# Patient Record
Sex: MALE | Race: WHITE | Employment: FULL TIME | URBAN - METROPOLITAN AREA
[De-identification: names, ages, dates, MRNs, and addresses within clinical notes are randomized per-mention and may not be internally consistent; named-entity substitution may affect disease eponyms.]

---

## 2019-10-23 ENCOUNTER — HOSPITAL ENCOUNTER (OUTPATIENT)
Dept: CT IMAGING | Age: 59
Discharge: HOME | End: 2019-10-23
Attending: NURSE PRACTITIONER
Payer: COMMERCIAL

## 2019-10-23 DIAGNOSIS — I73.9 PAD (PERIPHERAL ARTERY DISEASE) (HCC): ICD-10-CM

## 2019-10-23 LAB — CREAT BLD-MCNC: 0.8 MG/DL (ref 0.8–1.5)

## 2019-10-23 PROCEDURE — 82565 ASSAY OF CREATININE: CPT

## 2019-10-23 PROCEDURE — 74011000258 HC RX REV CODE- 258: Performed by: NURSE PRACTITIONER

## 2019-10-23 PROCEDURE — 74011636320 HC RX REV CODE- 636/320: Performed by: NURSE PRACTITIONER

## 2019-10-23 PROCEDURE — 75635 CT ANGIO ABDOMINAL ARTERIES: CPT

## 2019-10-23 RX ORDER — SODIUM CHLORIDE 0.9 % (FLUSH) 0.9 %
10 SYRINGE (ML) INJECTION
Status: COMPLETED | OUTPATIENT
Start: 2019-10-23 | End: 2019-10-23

## 2019-10-23 RX ADMIN — SODIUM CHLORIDE 100 ML: 900 INJECTION, SOLUTION INTRAVENOUS at 08:41

## 2019-10-23 RX ADMIN — IOPAMIDOL 100 ML: 755 INJECTION, SOLUTION INTRAVENOUS at 08:41

## 2019-10-23 RX ADMIN — Medication 10 ML: at 08:41

## 2021-12-09 PROBLEM — K40.20 NON-RECURRENT BILATERAL INGUINAL HERNIA WITHOUT OBSTRUCTION OR GANGRENE: Status: ACTIVE | Noted: 2021-12-09

## 2022-03-20 PROBLEM — K40.20 NON-RECURRENT BILATERAL INGUINAL HERNIA WITHOUT OBSTRUCTION OR GANGRENE: Status: ACTIVE | Noted: 2021-12-09

## 2022-07-15 ENCOUNTER — OFFICE VISIT (OUTPATIENT)
Dept: VASCULAR SURGERY | Age: 62
End: 2022-07-15
Payer: COMMERCIAL

## 2022-07-15 VITALS
OXYGEN SATURATION: 97 % | HEIGHT: 70 IN | HEART RATE: 72 BPM | SYSTOLIC BLOOD PRESSURE: 156 MMHG | TEMPERATURE: 97.3 F | DIASTOLIC BLOOD PRESSURE: 85 MMHG | WEIGHT: 194.2 LBS | BODY MASS INDEX: 27.8 KG/M2

## 2022-07-15 DIAGNOSIS — I73.9 PVD (PERIPHERAL VASCULAR DISEASE) WITH CLAUDICATION (HCC): Primary | ICD-10-CM

## 2022-07-15 PROCEDURE — 99213 OFFICE O/P EST LOW 20 MIN: CPT | Performed by: SURGERY

## 2022-07-15 ASSESSMENT — PATIENT HEALTH QUESTIONNAIRE - PHQ9
1. LITTLE INTEREST OR PLEASURE IN DOING THINGS: 0
SUM OF ALL RESPONSES TO PHQ QUESTIONS 1-9: 0
SUM OF ALL RESPONSES TO PHQ QUESTIONS 1-9: 0
SUM OF ALL RESPONSES TO PHQ9 QUESTIONS 1 & 2: 0
2. FEELING DOWN, DEPRESSED OR HOPELESS: 0
SUM OF ALL RESPONSES TO PHQ QUESTIONS 1-9: 0
SUM OF ALL RESPONSES TO PHQ QUESTIONS 1-9: 0

## 2022-07-15 NOTE — PROGRESS NOTES
[address]   [address] FAX: [phone] [facility]. : 1960    Chief Complaint:    History of Present Illness: Follow-up for duplex study. History of aneurysm disease. Patient denies any claudication or rest pain symptoms. CURRENT MEDICATIONS:  Current Outpatient Medications   Medication Sig Dispense Refill    aspirin 81 MG EC tablet Take 81 mg by mouth daily       No current facility-administered medications for this visit. Past Medical History:   Diagnosis Date    Diverticulosis of colon (without mention of hemorrhage)     no symptoms    Dysrhythmia, cardiac     heart palpatations on occasion    Hypercholesterolemia     managed with meds    GABE on CPAP     central and obstructive, CPAP 9 cm H2O does not wear cpap    Personal history of colonic polyps 2015    adenoma, TVA    PVD (peripheral vascular disease) with claudication (HCC)     Snoring     Tobacco abuse        Physical Examination:   Height: 5' 10\" (1.778 m), Weight: 194 lb 3.2 oz (88.1 kg), BP: (!) 156/85    Constitutional: he appears well-developed. No distress. HENT:   Head: Atraumatic. Eyes: Pupils are equal, round, and reactive to light. Neck: Normal range of motion. Cardiovascular: Regular rhythm. Pulmonary/Chest: Effort normal and breath sounds normal. No respiratory distress. Abdominal: Soft. Bowel sounds are normal. he exhibits no distension. There is no tenderness. There is no guarding. No hernia. Musculoskeletal: Normal range of motion. Neurological: He is alert. 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 Yeison Tamayo. is a 63y.o. 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   Will order CTA runoff in preparation to endovascular repair. Nirmal Chappell MD    Elements of this note have been dictated using speech recognition software. As a result, errors of speech recognition may have occurred.     30 minutes of time was spent on this encounter including chart review, assessment, evaluation and coordination of patient care

## 2022-07-15 NOTE — PATIENT INSTRUCTIONS
Patient Education        Carotid Stenosis: Care Instructions  Overview     Carotid stenosis is narrowing of one or both of the carotid arteries. These arteries take blood from the heart to the brain. There is one on each side ofthe neck. Carotid artery stenosis is caused by a process called hardening of the arteries, or atherosclerosis. A substance called plaque builds up inside the carotid arteries. Things that can lead to plaque include diabetes, high blood pressure, high cholesterol, and smoking. This plaque may limit blood flow to your brain. If plaque breaks open, it may form a blood clot. Or pieces of the plaque may break off. A piece of plaque or a blood clot could move to thebrain, causing a stroke or transient ischemic attack (TIA). The goal of treatment is to lower your risk of having a stroke or TIA. You can lower your risk by having a heart-healthy lifestyle and taking medicine. Sometimes a surgery or procedure is also done. Follow-up care is a key part of your treatment and safety. Be sure to make and go to all appointments, and call your doctor if you are having problems. It's also a good idea to know your test results and keep alist of the medicines you take. How can you care for yourself at home? Take your medicines exactly as prescribed. Call your doctor if you think you are having a problem with your medicine. You may take medicine to lower your blood pressure, to lower your cholesterol, or to prevent blood clots. If you take a blood thinner, such as aspirin, be sure to get instructions about how to take your medicine safely. Blood thinners can cause serious bleeding problems. Do not smoke. People who smoke have a higher chance of stroke than those who quit. If you need help quitting, talk to your doctor about stop-smoking programs and medicines. These can increase your chances of quitting for good. Eat heart-healthy foods.  These foods include vegetables, fruits, nuts, beans, lean meat, Incorporated disclaims any warranty or liability for your use of this information. Patient Education        Peripheral Arterial Disease (PAD): Care Instructions  Overview  Peripheral arterial disease (PAD) occurs when the blood vessels (arteries) that supply blood to the legs, belly, pelvis, arms, or neck get narrow or blocked. This reduces blood flow to that area. The legs are affected most often. PAD is often caused by fatty buildup (plaque) in the arteries. This buildup is also called \"hardening\" of the arteries. Your risk of PAD increases if you smoke or have high cholesterol, high blood pressure, diabetes, or a familyhistory of PAD. Many people don't have symptoms. If you do have symptoms, you may have weak or tired legs, difficulty walking or balancing, or pain. If you have pain, you might feel a tight, aching, or squeezing pain in the calf, foot, thigh, or buttock that occurs during exercise. The pain usually gets worse during exercise and goes away when you rest. If PAD gets worse, you may have symptomsof poor blood flow, such as leg pain when you rest.  Medicines and lifestyle changes may help your symptoms and lower your risk of heart attack and stroke. In some cases, surgery or other treatment is needed. It is important that you follow up with your doctor. Follow-up care is a key part of your treatment and safety. Be sure to make and go to all appointments, and call your doctor if you are having problems. It's also a good idea to know your test results and keep alist of the medicines you take. How can you care for yourself at home? Do not smoke. Smoking can make PAD worse. If you need help quitting, talk to your doctor about stop-smoking programs and medicines. These can increase your chances of quitting for good. Take your medicines exactly as prescribed. Call your doctor if you think you are having a problem with your medicine.   If you take a blood thinner, such as aspirin, be sure to get both shoulders or arms. Lightheadedness or sudden weakness. A fast or irregular heartbeat. After you call 911, the  may tell you to chew 1 adult-strength or 2 to 4 low-doseaspirin. Wait for an ambulance. Do not try to drive yourself. You have sudden, severe leg pain, and your leg is cool and pale. You have symptoms of a stroke. These may include:  Sudden numbness, tingling, weakness, or loss of movement in your face, arm, or leg, especially on only one side of your body. Sudden vision changes. Sudden trouble speaking. Sudden confusion or trouble understanding simple statements. Sudden problems with walking or balance. A sudden, severe headache that is different from past headaches. Call your doctor now or seek immediate medical care if:    You have leg pain that does not go away even if you rest.     Your leg pain changes or gets worse. For example, if you have more pain with normal activity or the same pain with decreased activity, you should call. You have cold or numb feet or toes. You have leg or foot sores that are slow to heal.     The skin on your legs or feet changes color. You have an open sore on your leg or foot that is infected. Signs of infection include: Increased pain, swelling, warmth, or redness. Red streaks leading from the sore. Pus draining from the sore. A fever. Watch closely for changes in your health, and be sure to contact your doctor ifyou have any problems. Where can you learn more? Go to [email or web address].nr. org and sign in to your [employer] account. Enter [phone] in the [facility] box to learn more about \"Peripheral Arterial Disease (PAD): Care Instructions. \"     If you do not have an account, please click on the \"Sign Up Now\" link. Current as of: January 10, 2022               Content Version: 13.3  © 5449-0415 Healthwise, Incorporated. Care instructions adapted under license by [facility] ([facility]).  If you have questions

## 2022-07-28 ENCOUNTER — HOSPITAL ENCOUNTER (OUTPATIENT)
Dept: CT IMAGING | Age: 62
Discharge: HOME | End: 2022-07-31
Payer: COMMERCIAL

## 2022-07-28 DIAGNOSIS — I73.9 PVD (PERIPHERAL VASCULAR DISEASE) WITH CLAUDICATION (HCC): ICD-10-CM

## 2022-07-28 LAB — CREAT BLD-MCNC: 0.73 MG/DL (ref 0.8–1.5)

## 2022-07-28 PROCEDURE — 82565 ASSAY OF CREATININE: CPT

## 2022-07-28 PROCEDURE — 6360000004 HC RX CONTRAST MEDICATION: Performed by: SURGERY

## 2022-07-28 PROCEDURE — 75635 CT ANGIO ABDOMINAL ARTERIES: CPT

## 2022-07-28 PROCEDURE — 2580000003 HC RX 258: Performed by: SURGERY

## 2022-07-28 RX ORDER — 0.9 % SODIUM CHLORIDE 0.9 %
100 INTRAVENOUS SOLUTION INTRAVENOUS
Status: COMPLETED | OUTPATIENT
Start: 2022-07-28 | End: 2022-07-28

## 2022-07-28 RX ORDER — SODIUM CHLORIDE 0.9 % (FLUSH) 0.9 %
10 SYRINGE (ML) INJECTION
Status: COMPLETED | OUTPATIENT
Start: 2022-07-28 | End: 2022-07-28

## 2022-07-28 RX ADMIN — IOPAMIDOL 100 ML: 755 INJECTION, SOLUTION INTRAVENOUS at 11:39

## 2022-07-28 RX ADMIN — SODIUM CHLORIDE 100 ML: 900 INJECTION, SOLUTION INTRAVENOUS at 11:39

## 2022-07-28 RX ADMIN — SODIUM CHLORIDE, PRESERVATIVE FREE 10 ML: 5 INJECTION INTRAVENOUS at 11:39

## 2022-08-09 ENCOUNTER — OFFICE VISIT (OUTPATIENT)
Dept: VASCULAR SURGERY | Age: 62
End: 2022-08-09
Payer: COMMERCIAL

## 2022-08-09 VITALS
DIASTOLIC BLOOD PRESSURE: 96 MMHG | RESPIRATION RATE: 98 BRPM | WEIGHT: 187 LBS | BODY MASS INDEX: 26.77 KG/M2 | TEMPERATURE: 98.1 F | HEIGHT: 70 IN | SYSTOLIC BLOOD PRESSURE: 155 MMHG | HEART RATE: 58 BPM

## 2022-08-09 DIAGNOSIS — I73.9 PVD (PERIPHERAL VASCULAR DISEASE) WITH CLAUDICATION (HCC): Primary | ICD-10-CM

## 2022-08-09 PROCEDURE — 99213 OFFICE O/P EST LOW 20 MIN: CPT | Performed by: SURGERY

## 2022-08-09 NOTE — PROGRESS NOTES
[address]   830 [facility]. [address] FAX: [phone] [facility]. : 1960    Chief Complaint:     History of Present Illness:   Patient follows up today for presents with CTA with known popliteal artery aneurysm. He does have some pain in his right leg. CURRENT MEDICATIONS:  Current Outpatient Medications   Medication Sig Dispense Refill    aspirin 81 MG EC tablet Take 81 mg by mouth daily       No current facility-administered medications for this visit. Past Medical History:   Diagnosis Date    Diverticulosis of colon (without mention of hemorrhage)     no symptoms    Dysrhythmia, cardiac     heart palpatations on occasion    Hypercholesterolemia     managed with meds    GABE on CPAP     central and obstructive, CPAP 9 cm H2O does not wear cpap    Personal history of colonic polyps 2015    adenoma, TVA    PVD (peripheral vascular disease) with claudication (HCC)     Snoring     Tobacco abuse        Physical Examination:   Height: 5' 10\" (1.778 m), Weight: 187 lb (84.8 kg), BP: (!) 155/96    Constitutional: he appears well-developed. No distress. HENT:   Head: Atraumatic. Eyes: Pupils are equal, round, and reactive to light. Neck: Normal range of motion. Cardiovascular: Regular rhythm. Pulmonary/Chest: Effort normal and breath sounds normal. No respiratory distress. Abdominal: Soft. Bowel sounds are normal. he exhibits no distension. There is no tenderness. There is no guarding. No hernia. Musculoskeletal: Normal range of motion. Neurological: He is alert. CN II- XII grossly intact   Vascular: Pulses have    Imaging:  CTA showed a 2.6 cm right popliteal artery aneurysm        Recommendations/Plans:   Mr. Yeison Tamayo. is a 63y.o. year old male with a right popliteal artery aneurysm symptomatically we will schedule patient for right SFA cutdown with with stent placement.   I will vein met patient today to make sure evaluated right greater saphenous vein just in case patient is an endovascular candidate. His aneurysm extends just above his knee joint. I will review images with my endovascular rep if he is a good candidate for stent we will plan for right SFA cutdown with stent placement. If not a good candidate and patient will need to be undergo a right SFA to below-knee popliteal artery bypass    Arnie Foster MD    Elements of this note have been dictated using speech recognition software. As a result, errors of speech recognition may have occurred.     20 minutes of time was spent on this encounter including chart review, assessment, evaluation and coordination of patient care

## 2022-08-10 ENCOUNTER — PREP FOR PROCEDURE (OUTPATIENT)
Dept: VASCULAR SURGERY | Age: 62
End: 2022-08-10

## 2022-08-10 NOTE — PROGRESS NOTES
Patient initially scheduled for right SFA cutdown with a repair of popliteal artery aneurysm. Reviewed the images with the rep. Can do everything percutaneously. So we will just schedule patient for right leg arteriogram with balloon angioplasty and stent of popliteal artery.     Yuliana Muñoz

## 2022-08-25 NOTE — PERIOP NOTE
Patient verified name and . Order for consent not found in EHR; patient verifies procedure. Type II surgery, phone assessment complete. Orders not received. Labs per surgeon: none  Labs per anesthesia protocol: hgb and EKG, left patient a message requesting return call to come to [facility] for EKG and lab on Friday    Patient answered medical/surgical history questions at their best of ability. All prior to admission medications documented in [facility]. Patient instructed to take the following medications the day of surgery according to anesthesia guidelines with a small sip of water: Aspirin 81 mg    On the day before surgery please take Acetaminophen 1000mg in the morning and then again before bed. You may substitute for Tylenol 650 mg. Hold all vitamins 7 days prior to surgery and NSAIDS 5 days prior to surgery. Prescription meds to hold:none  Patient instructed on the following:    > Arrive at [facility], time of arrival to be called the day before by 1700  > NPO after midnight, unless otherwise indicated, including gum, mints, and ice chips  > Responsible adult must drive patient to the hospital, stay during surgery, and patient will need supervision 24 hours after anesthesia  > Use soap in shower the night before surgery and on the morning of surgery  > All piercings must be removed prior to arrival.    > Leave all valuables (money and jewelry) at home but bring insurance card and ID on DOS.   > You may be required to pay a deductible or co-pay on the day of your procedure. You can pre-pay by calling [phone] if your surgery is at the [facility] or [phone] if your surgery is at the [facility]. > Do not wear make-up, nail polish, lotions, cologne, perfumes, powders, or oil on skin. Artificial nails are not permitted.

## 2022-08-26 ENCOUNTER — HOSPITAL ENCOUNTER (OUTPATIENT)
Dept: PREADMISSION TESTING | Age: 62
Discharge: HOME | End: 2022-08-29
Payer: COMMERCIAL

## 2022-08-26 LAB
EKG ATRIAL RATE: 56 BPM
EKG DIAGNOSIS: NORMAL
EKG P AXIS: 26 DEGREES
EKG P-R INTERVAL: 194 MS
EKG Q-T INTERVAL: 450 MS
EKG QRS DURATION: 94 MS
EKG QTC CALCULATION (BAZETT): 434 MS
EKG R AXIS: 46 DEGREES
EKG T AXIS: 76 DEGREES
EKG VENTRICULAR RATE: 56 BPM
HGB BLD-MCNC: 15.8 G/DL (ref 13.6–17.2)

## 2022-08-26 PROCEDURE — 85018 HEMOGLOBIN: CPT

## 2022-08-26 NOTE — PERIOP NOTE
Left detailed message of pre op arrival time 0515 on 08/29/22. Pre op instructions reviewed. Left contact information for any additional questions or needs.

## 2022-08-28 ENCOUNTER — ANESTHESIA EVENT (OUTPATIENT)
Dept: SURGERY | Age: 62
End: 2022-08-28
Payer: COMMERCIAL

## 2022-08-28 ASSESSMENT — LIFESTYLE VARIABLES: SMOKING_STATUS: 1

## 2022-08-28 NOTE — ANESTHESIA PRE PROCEDURE
Department of Anesthesiology  Preprocedure Note       Name:  Gerard Fregoso. Age:  63 y.o.  :  1960                                          MRN:  [id number]         Date:  2022      Surgeon: Elliott Pringle):  Chucho Vaughn MD    Procedure: Procedure(s):  RIGHT LOWER EXTREMITY ARTERIOGRAM WITH BALLOON ANGIOPLASTY AND STENT OF POPLITEAL ARTERY    Medications prior to admission:   Prior to Admission medications    Medication Sig Start Date End Date Taking? Authorizing Provider   Multiple Vitamin (MULTIVITAMIN ADULT PO) Take by mouth daily   Yes Historical Provider, MD   aspirin 81 MG EC tablet Take 81 mg by mouth daily    Ar Automatic Reconciliation       Current medications:    No current facility-administered medications for this encounter.      Current Outpatient Medications   Medication Sig Dispense Refill    Multiple Vitamin (MULTIVITAMIN ADULT PO) Take by mouth daily      aspirin 81 MG EC tablet Take 81 mg by mouth daily         Allergies:  No Known Allergies    Problem List:    Patient Active Problem List   Diagnosis Code    Hypercholesterolemia E78.00    Snoring R06.83    Atherosclerosis of native artery of extremity with intermittent claudication (HCC) I70.219    Sleep apnea G47.30    Peripheral artery disease (HCC) I73.9    Dysrhythmia, cardiac I49.9    Popliteal artery aneurysm, bilateral (HCC) I72.4    Popliteal artery occlusion, 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    Non-recurrent bilateral inguinal hernia without obstruction or gangrene K40.20       Past Medical History:        Diagnosis Date    Diverticulosis of colon (without mention of hemorrhage)     no symptoms    Dysrhythmia, cardiac     heart palpatations on occasion    Hypercholesterolemia     managed with meds    GABE on CPAP     central and obstructive, CPAP 9 cm H2O does not wear cpap    Personal history of colonic polyps 2015    adenoma, TVA    PVD (peripheral vascular disease) with claudication (HCC)     Snoring  Tobacco abuse        Past Surgical History:        Procedure Laterality Date    COLONOSCOPY  last 2/16/15    Uma--three trans TA, one spl flx TVA, rectal TVA--3 year recall    KNEE ARTHROSCOPY Left 1996    VASCULAR SURGERY Left 6-18-15    profundoplasty of profunda femoris artery       Social History:    Social History     Tobacco Use    Smoking status: Every Day     Packs/day: 1.00     Years: 40.00     Pack years: 40.00     Types: Cigarettes    Smokeless tobacco: Never   Substance Use Topics    Alcohol use: Not Currently                                Ready to quit: Not Answered  Counseling given: Not Answered      Vital Signs (Current):   Vitals:    08/25/22 1324   Weight: 187 lb (84.8 kg)   Height: 5' 11\" (1.803 m)                                              BP Readings from Last 3 Encounters:   08/09/22 (!) 155/96   07/15/22 (!) 156/85   12/21/21 (!) 177/102       NPO Status:                                                                                 BMI:   Wt Readings from Last 3 Encounters:   08/09/22 187 lb (84.8 kg)   07/15/22 194 lb 3.2 oz (88.1 kg)   12/21/21 190 lb (86.2 kg)     Body mass index is 26.08 kg/m². CBC:   Lab Results   Component Value Date/Time    HGB 15.8 08/26/2022 11:58 AM       CMP:   Lab Results   Component Value Date/Time    CREATININE 0.73 07/28/2022 11:08 AM    GFRAA >60 10/23/2019 08:38 AM    LABGLOM >60 10/23/2019 08:38 AM       POC Tests: No results for input(s): POCGLU, POCNA, POCK, POCCL, POCBUN, POCHEMO, POCHCT in the last 72 hours.     Coags: No results found for: PROTIME, INR, APTT    HCG (If Applicable): No results found for: PREGTESTUR, PREGSERUM, HCG, HCGQUANT     ABGs: No results found for: PHART, PO2ART, BBV2TZQ, ZOH8CLT, BEART, J7WTDKAE     Type & Screen (If Applicable):  No results found for: LABABO, LABRH    Drug/Infectious Status (If Applicable):  No results found for: HIV, HEPCAB    COVID-19 Screening (If Applicable): No results found for: COVID19        Anesthesia Evaluation  Patient summary reviewed and Nursing notes reviewed no history of anesthetic complications:   Airway: Mallampati: I  TM distance: >3 FB   Neck ROM: full  Mouth opening: > = 3 FB   Dental:    (+) upper dentures      Pulmonary: breath sounds clear to auscultation  (+) sleep apnea: on CPAP,  current smoker (Every Day - 40 pack years)                           Cardiovascular:  Exercise tolerance: good (>4 METS),   (+) dysrhythmias (heart palpatations on occasion):, hyperlipidemia    (-) past MI      Rhythm: regular  Rate: normal                 ROS comment: EKG 8/2022 -  Sinus bradycardia 56 bpm     Neuro/Psych:   Negative Neuro/Psych ROS     (-) CVA           GI/Hepatic/Renal:        (-) GERD      ROS comment: Non-recurrent bilateral inguinal hernia without obstruction or gangrene. Endo/Other: Negative Endo/Other ROS                    Abdominal:             Vascular:   + PVD, aortic or cerebral, . ROS comment: Atherosclerosis of native artery of extremity with intermittent claudication     Popliteal artery aneurysm, bilateral  Popliteal artery occlusion, left     . Other Findings:           Anesthesia Plan      TIVA     ASA 3       Induction: intravenous. MIPS: Postoperative opioids intended and Prophylactic antiemetics administered. Anesthetic plan and risks discussed with patient and spouse. Plan discussed with CRNA.                     Fahad Hansen MD   8/28/2022

## 2022-08-29 ENCOUNTER — ANESTHESIA (OUTPATIENT)
Dept: SURGERY | Age: 62
End: 2022-08-29
Payer: COMMERCIAL

## 2022-08-29 ENCOUNTER — APPOINTMENT (OUTPATIENT)
Dept: INTERVENTIONAL RADIOLOGY/VASCULAR | Age: 62
End: 2022-08-29
Attending: SURGERY
Payer: COMMERCIAL

## 2022-08-29 ENCOUNTER — HOSPITAL ENCOUNTER (OUTPATIENT)
Age: 62
Setting detail: OUTPATIENT SURGERY
Discharge: HOME | End: 2022-08-29
Attending: SURGERY | Admitting: SURGERY
Payer: COMMERCIAL

## 2022-08-29 VITALS
WEIGHT: 187 LBS | BODY MASS INDEX: 26.18 KG/M2 | OXYGEN SATURATION: 99 % | HEIGHT: 71 IN | SYSTOLIC BLOOD PRESSURE: 158 MMHG | TEMPERATURE: 97.5 F | RESPIRATION RATE: 15 BRPM | DIASTOLIC BLOOD PRESSURE: 84 MMHG | HEART RATE: 51 BPM

## 2022-08-29 LAB — CREAT BLD-MCNC: 0.72 MG/DL (ref 0.8–1.5)

## 2022-08-29 PROCEDURE — 35151 REPAIR DEFECT OF ARTERY: CPT | Performed by: SURGERY

## 2022-08-29 PROCEDURE — 7100000010 HC PHASE II RECOVERY - FIRST 15 MIN: Performed by: SURGERY

## 2022-08-29 PROCEDURE — 37226 HC FEMPOP PLASTY & STENT: CPT

## 2022-08-29 PROCEDURE — 3600000014 HC SURGERY LEVEL 4 ADDTL 15MIN: Performed by: SURGERY

## 2022-08-29 PROCEDURE — 7100000000 HC PACU RECOVERY - FIRST 15 MIN: Performed by: SURGERY

## 2022-08-29 PROCEDURE — 7100000001 HC PACU RECOVERY - ADDTL 15 MIN: Performed by: SURGERY

## 2022-08-29 PROCEDURE — 6360000002 HC RX W HCPCS: Performed by: SURGERY

## 2022-08-29 PROCEDURE — 6360000002 HC RX W HCPCS: Performed by: NURSE ANESTHETIST, CERTIFIED REGISTERED

## 2022-08-29 PROCEDURE — 3600000004 HC SURGERY LEVEL 4 BASE: Performed by: SURGERY

## 2022-08-29 PROCEDURE — C1874 STENT, COATED/COV W/DEL SYS: HCPCS | Performed by: SURGERY

## 2022-08-29 PROCEDURE — 6360000004 HC RX CONTRAST MEDICATION: Performed by: SURGERY

## 2022-08-29 PROCEDURE — 3600000017 HC SURGERY HYBRID ADDL 15MIN: Performed by: SURGERY

## 2022-08-29 PROCEDURE — 99218 PR INITIAL OBSERVATION CARE/DAY 30 MINUTES: CPT | Performed by: SURGERY

## 2022-08-29 PROCEDURE — 75625 CONTRAST EXAM ABDOMINL AORTA: CPT

## 2022-08-29 PROCEDURE — 3700000001 HC ADD 15 MINUTES (ANESTHESIA): Performed by: SURGERY

## 2022-08-29 PROCEDURE — 2500000003 HC RX 250 WO HCPCS: Performed by: NURSE ANESTHETIST, CERTIFIED REGISTERED

## 2022-08-29 PROCEDURE — 7100000011 HC PHASE II RECOVERY - ADDTL 15 MIN: Performed by: SURGERY

## 2022-08-29 PROCEDURE — 6370000000 HC RX 637 (ALT 250 FOR IP): Performed by: ANESTHESIOLOGY

## 2022-08-29 PROCEDURE — 6360000002 HC RX W HCPCS: Performed by: ANESTHESIOLOGY

## 2022-08-29 PROCEDURE — 2580000003 HC RX 258: Performed by: ANESTHESIOLOGY

## 2022-08-29 PROCEDURE — 3600000007 HC SURGERY HYBRID BASE: Performed by: SURGERY

## 2022-08-29 PROCEDURE — 2500000003 HC RX 250 WO HCPCS: Performed by: SURGERY

## 2022-08-29 PROCEDURE — 82565 ASSAY OF CREATININE: CPT

## 2022-08-29 PROCEDURE — 3700000000 HC ANESTHESIA ATTENDED CARE: Performed by: SURGERY

## 2022-08-29 DEVICE — STENT PERIPH L100MM DIA7MM CATH L120CM DIA7FR 0.035IN HEP: Type: IMPLANTABLE DEVICE | Site: POPLITEAL | Status: FUNCTIONAL

## 2022-08-29 RX ORDER — SODIUM CHLORIDE 9 MG/ML
INJECTION, SOLUTION INTRAVENOUS PRN
Status: DISCONTINUED | OUTPATIENT
Start: 2022-08-29 | End: 2022-08-29 | Stop reason: HOSPADM

## 2022-08-29 RX ORDER — HYDROMORPHONE HYDROCHLORIDE 2 MG/ML
0.5 INJECTION, SOLUTION INTRAMUSCULAR; INTRAVENOUS; SUBCUTANEOUS EVERY 5 MIN PRN
Status: DISCONTINUED | OUTPATIENT
Start: 2022-08-29 | End: 2022-08-29 | Stop reason: HOSPADM

## 2022-08-29 RX ORDER — LABETALOL HYDROCHLORIDE 5 MG/ML
10 INJECTION, SOLUTION INTRAVENOUS
Status: DISCONTINUED | OUTPATIENT
Start: 2022-08-29 | End: 2022-08-29 | Stop reason: HOSPADM

## 2022-08-29 RX ORDER — HEPARIN SODIUM 200 [USP'U]/100ML
INJECTION, SOLUTION INTRAVENOUS CONTINUOUS PRN
Status: DISCONTINUED | OUTPATIENT
Start: 2022-08-29 | End: 2022-08-29 | Stop reason: HOSPADM

## 2022-08-29 RX ORDER — SODIUM CHLORIDE 9 MG/ML
INJECTION, SOLUTION INTRAVENOUS CONTINUOUS
Status: DISCONTINUED | OUTPATIENT
Start: 2022-08-29 | End: 2022-08-29 | Stop reason: HOSPADM

## 2022-08-29 RX ORDER — LIDOCAINE HYDROCHLORIDE 10 MG/ML
1 INJECTION, SOLUTION INFILTRATION; PERINEURAL
Status: DISCONTINUED | OUTPATIENT
Start: 2022-08-29 | End: 2022-08-29 | Stop reason: HOSPADM

## 2022-08-29 RX ORDER — OXYCODONE HYDROCHLORIDE 5 MG/1
10 TABLET ORAL PRN
Status: DISCONTINUED | OUTPATIENT
Start: 2022-08-29 | End: 2022-08-29 | Stop reason: HOSPADM

## 2022-08-29 RX ORDER — OXYCODONE HYDROCHLORIDE 5 MG/1
5 TABLET ORAL PRN
Status: DISCONTINUED | OUTPATIENT
Start: 2022-08-29 | End: 2022-08-29 | Stop reason: HOSPADM

## 2022-08-29 RX ORDER — HYDROMORPHONE HYDROCHLORIDE 2 MG/ML
0.5 INJECTION, SOLUTION INTRAMUSCULAR; INTRAVENOUS; SUBCUTANEOUS
Status: DISCONTINUED | OUTPATIENT
Start: 2022-08-29 | End: 2022-08-29 | Stop reason: HOSPADM

## 2022-08-29 RX ORDER — LIDOCAINE HCL/PF 100 MG/5ML
SYRINGE (ML) INJECTION PRN
Status: DISCONTINUED | OUTPATIENT
Start: 2022-08-29 | End: 2022-08-29 | Stop reason: SDUPTHER

## 2022-08-29 RX ORDER — MORPHINE SULFATE 2 MG/ML
2 INJECTION, SOLUTION INTRAMUSCULAR; INTRAVENOUS EVERY 4 HOURS PRN
Status: DISCONTINUED | OUTPATIENT
Start: 2022-08-29 | End: 2022-08-29 | Stop reason: HOSPADM

## 2022-08-29 RX ORDER — ONDANSETRON 2 MG/ML
4 INJECTION INTRAMUSCULAR; INTRAVENOUS
Status: DISCONTINUED | OUTPATIENT
Start: 2022-08-29 | End: 2022-08-29 | Stop reason: HOSPADM

## 2022-08-29 RX ORDER — DEXTROSE MONOHYDRATE 100 MG/ML
INJECTION, SOLUTION INTRAVENOUS CONTINUOUS PRN
Status: DISCONTINUED | OUTPATIENT
Start: 2022-08-29 | End: 2022-08-29 | Stop reason: HOSPADM

## 2022-08-29 RX ORDER — OXYCODONE HYDROCHLORIDE AND ACETAMINOPHEN 5; 325 MG/1; MG/1
1 TABLET ORAL EVERY 4 HOURS PRN
Status: DISCONTINUED | OUTPATIENT
Start: 2022-08-29 | End: 2022-08-29 | Stop reason: HOSPADM

## 2022-08-29 RX ORDER — PROPOFOL 10 MG/ML
INJECTION, EMULSION INTRAVENOUS CONTINUOUS PRN
Status: DISCONTINUED | OUTPATIENT
Start: 2022-08-29 | End: 2022-08-29 | Stop reason: SDUPTHER

## 2022-08-29 RX ORDER — SODIUM CHLORIDE, SODIUM LACTATE, POTASSIUM CHLORIDE, CALCIUM CHLORIDE 600; 310; 30; 20 MG/100ML; MG/100ML; MG/100ML; MG/100ML
INJECTION, SOLUTION INTRAVENOUS CONTINUOUS
Status: DISCONTINUED | OUTPATIENT
Start: 2022-08-29 | End: 2022-08-29 | Stop reason: HOSPADM

## 2022-08-29 RX ORDER — CLOPIDOGREL BISULFATE 75 MG/1
75 TABLET ORAL DAILY
Qty: 30 TABLET | Refills: 3 | Status: SHIPPED | OUTPATIENT
Start: 2022-08-29 | End: 2022-09-28

## 2022-08-29 RX ORDER — HYDROMORPHONE HYDROCHLORIDE 2 MG/ML
0.25 INJECTION, SOLUTION INTRAMUSCULAR; INTRAVENOUS; SUBCUTANEOUS EVERY 5 MIN PRN
Status: DISCONTINUED | OUTPATIENT
Start: 2022-08-29 | End: 2022-08-29 | Stop reason: HOSPADM

## 2022-08-29 RX ORDER — SODIUM CHLORIDE 0.9 % (FLUSH) 0.9 %
5-40 SYRINGE (ML) INJECTION EVERY 12 HOURS SCHEDULED
Status: DISCONTINUED | OUTPATIENT
Start: 2022-08-29 | End: 2022-08-29 | Stop reason: HOSPADM

## 2022-08-29 RX ORDER — SODIUM CHLORIDE 0.9 % (FLUSH) 0.9 %
5-40 SYRINGE (ML) INJECTION PRN
Status: DISCONTINUED | OUTPATIENT
Start: 2022-08-29 | End: 2022-08-29 | Stop reason: HOSPADM

## 2022-08-29 RX ORDER — PROTAMINE SULFATE 10 MG/ML
INJECTION, SOLUTION INTRAVENOUS PRN
Status: DISCONTINUED | OUTPATIENT
Start: 2022-08-29 | End: 2022-08-29 | Stop reason: SDUPTHER

## 2022-08-29 RX ORDER — HEPARIN SODIUM 1000 [USP'U]/ML
INJECTION, SOLUTION INTRAVENOUS; SUBCUTANEOUS PRN
Status: DISCONTINUED | OUTPATIENT
Start: 2022-08-29 | End: 2022-08-29 | Stop reason: SDUPTHER

## 2022-08-29 RX ORDER — MIDAZOLAM HYDROCHLORIDE 2 MG/2ML
2 INJECTION, SOLUTION INTRAMUSCULAR; INTRAVENOUS
Status: COMPLETED | OUTPATIENT
Start: 2022-08-29 | End: 2022-08-29

## 2022-08-29 RX ORDER — PROPOFOL 10 MG/ML
INJECTION, EMULSION INTRAVENOUS PRN
Status: DISCONTINUED | OUTPATIENT
Start: 2022-08-29 | End: 2022-08-29 | Stop reason: SDUPTHER

## 2022-08-29 RX ORDER — HYDROMORPHONE HYDROCHLORIDE 2 MG/ML
0.25 INJECTION, SOLUTION INTRAMUSCULAR; INTRAVENOUS; SUBCUTANEOUS
Status: DISCONTINUED | OUTPATIENT
Start: 2022-08-29 | End: 2022-08-29 | Stop reason: HOSPADM

## 2022-08-29 RX ORDER — ACETAMINOPHEN 500 MG
1000 TABLET ORAL ONCE
Status: COMPLETED | OUTPATIENT
Start: 2022-08-29 | End: 2022-08-29

## 2022-08-29 RX ADMIN — PROPOFOL 20 MG: 10 INJECTION, EMULSION INTRAVENOUS at 07:11

## 2022-08-29 RX ADMIN — PROPOFOL 10 MG: 10 INJECTION, EMULSION INTRAVENOUS at 07:08

## 2022-08-29 RX ADMIN — PROPOFOL 20 MG: 10 INJECTION, EMULSION INTRAVENOUS at 07:13

## 2022-08-29 RX ADMIN — MIDAZOLAM HYDROCHLORIDE 2 MG: 1 INJECTION, SOLUTION INTRAMUSCULAR; INTRAVENOUS at 06:41

## 2022-08-29 RX ADMIN — HEPARIN SODIUM 6000 UNITS: 1000 INJECTION, SOLUTION INTRAVENOUS; SUBCUTANEOUS at 07:34

## 2022-08-29 RX ADMIN — PROPOFOL 20 MG: 10 INJECTION, EMULSION INTRAVENOUS at 07:12

## 2022-08-29 RX ADMIN — FENTANYL CITRATE 25 MCG: 50 INJECTION, SOLUTION INTRAMUSCULAR; INTRAVENOUS at 07:55

## 2022-08-29 RX ADMIN — PROPOFOL 40 MG: 10 INJECTION, EMULSION INTRAVENOUS at 07:04

## 2022-08-29 RX ADMIN — ACETAMINOPHEN 1000 MG: 500 TABLET, FILM COATED ORAL at 06:34

## 2022-08-29 RX ADMIN — PROPOFOL 100 MCG/KG/MIN: 10 INJECTION, EMULSION INTRAVENOUS at 07:04

## 2022-08-29 RX ADMIN — PROPOFOL 20 MG: 10 INJECTION, EMULSION INTRAVENOUS at 07:21

## 2022-08-29 RX ADMIN — PROTAMINE SULFATE 30 MG: 10 INJECTION, SOLUTION INTRAVENOUS at 07:57

## 2022-08-29 RX ADMIN — SODIUM CHLORIDE, POTASSIUM CHLORIDE, SODIUM LACTATE AND CALCIUM CHLORIDE: 600; 310; 30; 20 INJECTION, SOLUTION INTRAVENOUS at 06:34

## 2022-08-29 RX ADMIN — PROPOFOL 20 MG: 10 INJECTION, EMULSION INTRAVENOUS at 07:19

## 2022-08-29 RX ADMIN — FENTANYL CITRATE 25 MCG: 50 INJECTION, SOLUTION INTRAMUSCULAR; INTRAVENOUS at 07:34

## 2022-08-29 RX ADMIN — Medication 2 G: at 07:08

## 2022-08-29 RX ADMIN — PROTAMINE SULFATE 10 MG: 10 INJECTION, SOLUTION INTRAVENOUS at 08:13

## 2022-08-29 RX ADMIN — Medication 100 MG: at 07:04

## 2022-08-29 ASSESSMENT — PAIN - FUNCTIONAL ASSESSMENT
PAIN_FUNCTIONAL_ASSESSMENT: 0-10
PAIN_FUNCTIONAL_ASSESSMENT: 0-10

## 2022-08-29 NOTE — OP NOTE
[address] [facility]  OPERATIVE REPORT    Name:  Bianca Ahumada  MR#:  [id number]  :  1960  ACCOUNT #:  [de-identified]  DATE OF SERVICE:  2022    CLINICAL SERVICE:  Vascular Surgery. PREOPERATIVE DIAGNOSIS:  Symptomatic right popliteal artery aneurysm measuring 2.7 cm. POSTOPERATIVE DIAGNOSIS:  Symptomatic right popliteal artery aneurysm measuring 2.7 cm. PROCEDURES PERFORMED:  1. Endovascular repair of right popliteal artery aneurysm using a Viabahn endovascular coverage stent 7 x100  (2)  2. Completion arteriogram.    SURGEON:  Fady Hurd MD    ASSISTANT:      ANESTHESIA:  General.    COMPLICATIONS:  None. SPECIMENS REMOVED:  None. IMPLANTS:      ESTIMATED BLOOD LOSS:  .    INDICATION FOR PROCEDURE:  This is a 68-year-old male with history of peripheral vascular disease status post left common femoral artery aneurysm repair with profundoplasty. He presented to my office with worsening pain in his right leg. popliteal artery aneurysm being more than 2.7 cm. Secondary to the size, the pain led him to proceed. PROCEDURE:  After getting informed consent, the patient was brought to the operating room. Anesthesia was then induced. Preop antibiotics were given before skin incision. The patient's bilateral groins were all prepped and draped in normal sterile fashion. Ultrasound was used to identify the right common femoral head. We accessed with 18-gauge needle, upsized to a 5-Taiwanese sheath over an 0.035 starter wire. We then did an aortogram.  We then got a catheter up into the contralateral common femoral artery. With the digital traction, identified the popliteal artery aneurysm. The tibioperoneal trunk being patent with the peroneal being the dominant runoff collateral flow to the posterior tibial, anterior tibial.  We then exchanged to a stiff wire. I then put a 7-Taiwanese Amplatz sheath into the contralateral common femoral artery.   We then gave 7000 of heparin. I then exchanged to 0.018 wire which I placed down to the peroneal artery. We then did a digital traction marking out the popliteal artery to proximal end and distal ending zone. the aneurysm stopped just above the knee joint where the two large collateral vessels were there. We did initially deploy a 7 x 100 into the distal popliteal artery and then overlapped it with another 7 x 100. Completion aneurysm showed that the sac was occluded. There was adequate flow down to the distal popliteal artery and tibioperoneal trunk. We then removed the sheath and exchanged for a 7-Serbian sheath. We did digital traction on the left. It showed the left common femoral artery and SFA were all patent. However, he had a chronic popliteal artery aneurysm occlusion with no filling of anything below the popliteal artery sac and this was from the contrast bolus, were not having any flow. However, we closed with a Mynx device. The patient was then extubated and returned to the PACU in stable condition.       Paola Brooks MD      [id number]  D:  08/29/2022 10:11  T:  08/29/2022 17:23  JOB #:  [id number]

## 2022-08-29 NOTE — BRIEF OP NOTE
[address]   830 [facility] [place]. [address]  [phone] FAX: [phone]    Brief Op Note Template Note    Pre-Op Diagnosis: Peripheral vascular disease, unspecified ([facility] [address] [I73.9]    Post-Op Diagnosis:  * No post-op diagnosis entered *    Procedures: Procedure(s):  RIGHT LOWER EXTREMITY ARTERIOGRAM WITH BALLOON ANGIOPLASTY AND STENT OF POPLITEAL ARTERY    Surgeon: Phil Crespo MD    Assistants: Surgeon(s):  Howie Saba MD      Anesthesia:  General     Findings: Symptomatic popliteal artery aneurysm 2.7 cm repaired endovascularly    Tourniquet Time:  * No tourniquets in log *    Estimated Blood Loss:               Specimens:            Implants:    Implant Name Type Inv. Item Serial No.  Lot No. LRB No. Used Action   STENT PERIPH L100MM DIA7MM CATH L120CM DIA7FR 0.035IN HEP - [id number] Peripheral stents STENT PERIPH L100MM DIA7MM CATH L120CM DIA7FR 0.035IN HEP [id number]  [employer]  Right 1 Implanted   STENT PERIPH L100MM DIA7MM CATH L120CM DIA7FR 0.035IN HEP - [id number] Peripheral stents STENT PERIPH L100MM DIA7MM CATH L120CM DIA7FR 0.035IN HEP [id number]  [employer]  Right 1 Implanted       Complications: None               Signed: Phil Crespo MD      Elements of this note have been dictated using speech recognition software. As a result, errors of speech recognition may have occurred.

## 2022-08-29 NOTE — PROGRESS NOTES
[address] FAX: [phone]         VASCULAR SURGERY FLOOR PROGRESS NOTE    Admit Date: 2022  POD: Day of Surgery    Procedure:  Procedure(s):  RIGHT LOWER EXTREMITY ARTERIOGRAM WITH BALLOON ANGIOPLASTY AND STENT OF POPLITEAL ARTERY    Subjective:     Patient has no new complaints. Objective:     Vitals:  Blood pressure (!) 179/87, pulse 62, temperature 97.9 °F (36.6 °C), temperature source Oral, resp. rate (!) 62, height 5' 11\" (1.803 m), weight 187 lb (84.8 kg), SpO2 97 %. Temp (24hrs), Av.9 °F (36.6 °C), Min:97.9 °F (36.6 °C), Max:97.9 °F (36.6 °C)      Intake / Output:  No intake or output data in the 24 hours ending 22 0700    Physical Exam:    Constitutional: he appears well-developed. No distress. HENT:   Head: Atraumatic. Eyes: Pupils are equal, round, and reactive to light. Neck: Normal range of motion. Cardiovascular: Regular rhythm. Pulmonary/Chest: Effort normal and breath sounds normal. No respiratory distress. Abdominal: Soft. Bowel sounds are normal. he exhibits no distension. There is no tenderness. There is no guarding. No hernia. Musculoskeletal: Normal range of motion. Neurological: He is alert.  CN II- XII grossly intact  Vascular: Palpable pedal pulses    Labs:   Recent Labs     22  1158   HGB 15.8       Data Review     Assessment:     Patient Active Problem List    Diagnosis Date Noted    Non-recurrent bilateral inguinal hernia without obstruction or gangrene 2021    Peripheral artery disease ([facility] 2015    Popliteal artery aneurysm, bilateral ([facility]) 2015    Hypercholesterolemia     Atherosclerosis of native artery of extremity with intermittent claudication ([address] 2014    Popliteal artery occlusion, left ([facility]) 2014    Snoring     Sleep apnea     Dysrhythmia, cardiac        Plan/Recommendations/Medical Decision Making:     Right leg claudication with a 2.6 cm popliteal artery aneurysm  -Plan today for right leg arteriogram with balloon angioplasty and stenting of the symptomatic 2.6 cm popliteal artery aneurysm    Elements of this n she is she knows she can get 1 amputation above the knee on the left example she wantsote have been dictated using speech recognition software. As a result, errors of speech recognition may have occurred.

## 2022-08-29 NOTE — DISCHARGE INSTRUCTIONS
Arteriogram: What to Expect at [address] [place] [address] Drive inserted a thin, flexible tube (catheter) into a blood vessel in your groin. In some cases, the catheter is placed in a blood vessel in the arm. After an arteriogram, your groin or arm may have a bruise and feel sore for a day or two. You can do light activities around the house but nothing strenuous for several days. Your doctor may give you specific instructions on when you can do your normal activities again, such as driving and going back to work. This care sheet gives you a general idea about how long it will take for you to recover. But each person recovers at a different pace. Follow the steps below to feel better as quickly as possible. How can you care for yourself at home? Activity  Do not do strenuous exercise and do not lift, pull, or push anything heavy until your doctor says it is okay. This may be for a day or two. You can walk around the house and do light activity, such as cooking. You may shower 24 to 48 hours after the procedure, if your doctor okays it. Pat the incision dry. Do not take a bath for 1 week, or until your doctor tells you it is okay. If the catheter was placed in your groin, try not to walk up stairs for the first couple of days. If your doctor recommends it, get more exercise. Walking is a good choice. Bit by bit, increase the amount you walk every day. Try for at least 30 minutes on most days of the week. Diet  Drink plenty of fluids to help your body flush out the dye. If you have kidney, heart, or liver disease and have to limit fluids, talk with your doctor before you increase the amount of fluids you drink. You can eat your normal diet. If your stomach is upset, try bland, low-fat foods like plain rice, broiled chicken, toast, and yogurt. Medicines  Be safe with medicines. Read and follow all instructions on the label. If the doctor gave you a prescription medicine for pain, take it as prescribed.   If you are not taking a prescription pain medicine, ask your doctor if you can take an over-the-counter medicine. If you take blood thinners, such as warfarin (Coumadin), clopidogrel (Plavix), or aspirin, be sure to talk to your doctor. He or she will tell you if and when to start taking those medicines again. Make sure that you understand exactly what your doctor wants you to do. Your doctor will tell you if and when you can restart your medicines. He or she will also give you instructions about taking any new medicines. Care of the catheter site  You will have a dressing over the cut (incision). A dressing helps the incision heal and protects it. Your doctor will tell you how to take care of this. Put ice or a cold pack on the area for 10 to 20 minutes at a time to help with soreness or swelling. Put a thin cloth between the ice and your skin. Follow-up care is a key part of your treatment and safety. Be sure to make and go to all appointments, and call your doctor if you are having problems. It's also a good idea to know your test results and keep a list of the medicines you take. When should you call for help? Call 911 anytime you think you may need emergency care. For example, call if:  You passed out (lost consciousness). You have severe trouble breathing. You have sudden chest pain and shortness of breath, or you cough up blood. Call your doctor now or seek immediate medical care if:  You are bleeding from the area where the catheter was put in your artery. You have a fast-growing, painful lump at the catheter site. You have signs of infection, such as: Increased pain, swelling, warmth, or redness. Red streaks leading from the incision. Pus draining from the incision. A fever.   After general anesthesia or intravenous sedation, for 24 hours or while taking prescription Narcotics:  Limit your activities  A responsible adult needs to be with you for the next 24 hours  Do not drive and operate hazardous machinery  Do not make important personal or business decisions  Do not drink alcoholic beverages  If you have not urinated within 8 hours after discharge, and you are experiencing discomfort from urinary retention, please go to the nearest ED. If you have sleep apnea and have a CPAP machine, please use it for all naps and sleeping. Please use caution when taking narcotics and any of your home medications that may cause drowsiness. *  Please give a list of your current medications to your Primary Care Provider. *  Please update this list whenever your medications are discontinued, doses are      changed, or new medications (including over-the-counter products) are added. *  Please carry medication information at all times in case of emergency situations. These are general instructions for a healthy lifestyle:  No smoking/ No tobacco products/ Avoid exposure to second hand smoke  Surgeon General's Warning:  Quitting smoking now greatly reduces serious risk to your health. Obesity, smoking, and sedentary lifestyle greatly increases your risk for illness  A healthy diet, regular physical exercise & weight monitoring are important for maintaining a healthy lifestyle    You may be retaining fluid if you have a history of heart failure or if you experience any of the following symptoms:  Weight gain of 3 pounds or more overnight or 5 pounds in a week, increased swelling in our hands or feet or shortness of breath while lying flat in bed. Please call your doctor as soon as you notice any of these symptoms; do not wait until your next office visit.

## 2022-08-29 NOTE — PROGRESS NOTES
Patient status post endovascular pair of a popliteal artery aneurysm. Patient will lay flat for 2 hours. We will call in prescription for Plavix that he will start starting tomorrow instead of the aspirin. Completion left lower extremity arteriogram did not show any filling vessels below the pop with a chronic popliteal aneurysm occlusion. Patient is to be back in 2 weeks for postop visit. If worsening pain in his left leg he will need to have a diagnostic arteriogram first to see if he is a bypass candidate.     Jw Loya

## 2022-08-29 NOTE — ANESTHESIA POSTPROCEDURE EVALUATION
Department of Anesthesiology  Postprocedure Note    Patient: Gerard Iraheta MRN: [id number]  YOB: 1960  Date of evaluation: 8/29/2022      Procedure Summary     Date: 08/29/22 Room / Location: [facility] MAIN OR 12 / [facility] MAIN OR    Anesthesia Start: 4848 Anesthesia Stop: 1002    Procedure: RIGHT LOWER EXTREMITY ARTERIOGRAM WITH BALLOON ANGIOPLASTY AND STENT OF POPLITEAL ARTERY (Left: Groin) Diagnosis:       Peripheral vascular disease, unspecified ([address]      (Peripheral vascular disease, unspecified ([address] [I73.9])    Providers: Chucho Vaughn MD Responsible Provider: Clarence King MD    Anesthesia Type: TIVA ASA Status: 3          Anesthesia Type: No value filed.     Otf Phase I: Otf Score: 9    Otf Phase II: Otf Score: 10      Anesthesia Post Evaluation    Patient location during evaluation: PACU  Patient participation: complete - patient participated  Level of consciousness: awake and alert  Pain score: 0  Airway patency: patent  Nausea & Vomiting: no nausea and no vomiting  Complications: no  Cardiovascular status: hemodynamically stable  Respiratory status: acceptable, nonlabored ventilation and spontaneous ventilation  Hydration status: euvolemic  Comments: BP (!) 158/84   Pulse 51   Temp 97.5 °F (36.4 °C) (Temporal)   Resp 15   Ht 5' 11\" (1.803 m)   Wt 187 lb (84.8 kg)   SpO2 99%   BMI 26.08 kg/m²     Multimodal analgesia pain management approach

## 2022-08-30 ENCOUNTER — TELEPHONE (OUTPATIENT)
Dept: VASCULAR SURGERY | Age: 62
End: 2022-08-30

## 2022-08-30 NOTE — TELEPHONE ENCOUNTER
Pt c/o having some pain, had surgery yesterday and wanting to know what he could take    -informed him he can take whatever he would take for pain, ok for ice just not on incision, he leg elevated.   He may have some swelling,bruising     Noted to Gallo Byrd NP

## 2022-08-31 ENCOUNTER — TELEPHONE (OUTPATIENT)
Dept: VASCULAR SURGERY | Age: 62
End: 2022-08-31

## 2022-08-31 DIAGNOSIS — I70.213 ATHEROSCLEROSIS OF NATIVE ARTERY OF BOTH LOWER EXTREMITIES WITH INTERMITTENT CLAUDICATION (HCC): Primary | ICD-10-CM

## 2022-08-31 RX ORDER — OXYCODONE HYDROCHLORIDE AND ACETAMINOPHEN 5; 325 MG/1; MG/1
1 TABLET ORAL EVERY 6 HOURS PRN
Qty: 12 TABLET | Refills: 0 | Status: SHIPPED | OUTPATIENT
Start: 2022-08-31 | End: 2022-09-03

## 2022-08-31 NOTE — TELEPHONE ENCOUNTER
Pt c/o incision area has some redness/purplish, swelling and still having pain. He is returning to work tomorrow. ** per DTW symptoms can be normal, should be fine, if he wants any pain meds will send to pharmacy      -informed him Issa Segura NP will send pain med to [employer] [facility].   He may not be able to work taking the pain med, if he needs a few more days off let me know

## 2022-10-07 ENCOUNTER — OFFICE VISIT (OUTPATIENT)
Dept: VASCULAR SURGERY | Age: 62
End: 2022-10-07
Payer: COMMERCIAL

## 2022-10-07 VITALS
HEART RATE: 71 BPM | BODY MASS INDEX: 25.9 KG/M2 | TEMPERATURE: 97.4 F | WEIGHT: 185 LBS | HEIGHT: 71 IN | SYSTOLIC BLOOD PRESSURE: 164 MMHG | OXYGEN SATURATION: 98 % | DIASTOLIC BLOOD PRESSURE: 93 MMHG

## 2022-10-07 DIAGNOSIS — I73.9 PVD (PERIPHERAL VASCULAR DISEASE) WITH CLAUDICATION (HCC): Primary | ICD-10-CM

## 2022-10-07 PROCEDURE — 99024 POSTOP FOLLOW-UP VISIT: CPT | Performed by: SURGERY

## 2022-10-07 RX ORDER — LOSARTAN POTASSIUM 25 MG/1
25 TABLET ORAL DAILY
COMMUNITY

## 2022-10-07 ASSESSMENT — PATIENT HEALTH QUESTIONNAIRE - PHQ9
SUM OF ALL RESPONSES TO PHQ QUESTIONS 1-9: 0
1. LITTLE INTEREST OR PLEASURE IN DOING THINGS: 0
SUM OF ALL RESPONSES TO PHQ9 QUESTIONS 1 & 2: 0
SUM OF ALL RESPONSES TO PHQ QUESTIONS 1-9: 0
2. FEELING DOWN, DEPRESSED OR HOPELESS: 0

## 2022-10-07 NOTE — PROGRESS NOTES
pulses    Imaging:          Recommendations/Plans:   Mr. Dave Alfredo. is a 63y.o. year old male status post stent placement of popliteal artery aneurysm well-perfused with palpable pedal pulses. Follow-up in 6 months with a duplex study. Sharyn Samuel MD    Elements of this note have been dictated using speech recognition software. As a result, errors of speech recognition may have occurred.     20 minutes of time was spent on this encounter including chart review, assessment, evaluation and coordination of patient care

## 2022-10-11 ENCOUNTER — APPOINTMENT (OUTPATIENT)
Dept: CT IMAGING | Age: 62
DRG: 315 | End: 2022-10-11
Payer: COMMERCIAL

## 2022-10-11 ENCOUNTER — HOSPITAL ENCOUNTER (INPATIENT)
Age: 62
LOS: 2 days | Discharge: HOME | DRG: 315 | End: 2022-10-13
Attending: EMERGENCY MEDICINE | Admitting: SURGERY
Payer: COMMERCIAL

## 2022-10-11 DIAGNOSIS — I70.90 ARTERIAL OCCLUSION: Primary | ICD-10-CM

## 2022-10-11 PROBLEM — I70.201 RIGHT POPLITEAL ARTERY OCCLUSION (HCC): Status: ACTIVE | Noted: 2022-10-11

## 2022-10-11 LAB
ALBUMIN SERPL-MCNC: 3.5 G/DL (ref 3.2–4.6)
ALBUMIN/GLOB SERPL: 0.8 {RATIO} (ref 0.4–1.6)
ALP SERPL-CCNC: 73 U/L (ref 50–136)
ALT SERPL-CCNC: 19 U/L (ref 12–65)
ANION GAP SERPL CALC-SCNC: 5 MMOL/L (ref 2–11)
APTT PPP: 28 SEC (ref 24.1–35.1)
AST SERPL-CCNC: 13 U/L (ref 15–37)
BASOPHILS # BLD: 0 K/UL (ref 0–0.2)
BASOPHILS NFR BLD: 0 % (ref 0–2)
BILIRUB SERPL-MCNC: 0.5 MG/DL (ref 0.2–1.1)
BUN SERPL-MCNC: 11 MG/DL (ref 8–23)
CALCIUM SERPL-MCNC: 9.1 MG/DL (ref 8.3–10.4)
CHLORIDE SERPL-SCNC: 107 MMOL/L (ref 101–110)
CO2 SERPL-SCNC: 26 MMOL/L (ref 21–32)
CREAT SERPL-MCNC: 0.7 MG/DL (ref 0.8–1.5)
DIFFERENTIAL METHOD BLD: NORMAL
EOSINOPHIL # BLD: 0.1 K/UL (ref 0–0.8)
EOSINOPHIL NFR BLD: 1 % (ref 0.5–7.8)
ERYTHROCYTE [DISTWIDTH] IN BLOOD BY AUTOMATED COUNT: 11.8 % (ref 11.9–14.6)
ERYTHROCYTE [DISTWIDTH] IN BLOOD BY AUTOMATED COUNT: 11.9 % (ref 11.9–14.6)
GLOBULIN SER CALC-MCNC: 4.2 G/DL (ref 2.8–4.5)
GLUCOSE SERPL-MCNC: 105 MG/DL (ref 65–100)
HCT VFR BLD AUTO: 42.4 % (ref 41.1–50.3)
HCT VFR BLD AUTO: 43.6 % (ref 41.1–50.3)
HGB BLD-MCNC: 14 G/DL (ref 13.6–17.2)
HGB BLD-MCNC: 14.7 G/DL (ref 13.6–17.2)
IMM GRANULOCYTES # BLD AUTO: 0 K/UL (ref 0–0.5)
IMM GRANULOCYTES NFR BLD AUTO: 0 % (ref 0–5)
INR PPP: 1
LYMPHOCYTES # BLD: 1.4 K/UL (ref 0.5–4.6)
LYMPHOCYTES NFR BLD: 15 % (ref 13–44)
MCH RBC QN AUTO: 29.6 PG (ref 26.1–32.9)
MCH RBC QN AUTO: 30.1 PG (ref 26.1–32.9)
MCHC RBC AUTO-ENTMCNC: 33 G/DL (ref 31.4–35)
MCHC RBC AUTO-ENTMCNC: 33.7 G/DL (ref 31.4–35)
MCV RBC AUTO: 89.3 FL (ref 82–102)
MCV RBC AUTO: 89.6 FL (ref 82–102)
MONOCYTES # BLD: 0.7 K/UL (ref 0.1–1.3)
MONOCYTES NFR BLD: 8 % (ref 4–12)
NEUTS SEG # BLD: 7.1 K/UL (ref 1.7–8.2)
NEUTS SEG NFR BLD: 76 % (ref 43–78)
NRBC # BLD: 0 K/UL (ref 0–0.2)
NRBC # BLD: 0 K/UL (ref 0–0.2)
PLATELET # BLD AUTO: 197 K/UL (ref 150–450)
PLATELET # BLD AUTO: 212 K/UL (ref 150–450)
PMV BLD AUTO: 10 FL (ref 9.4–12.3)
PMV BLD AUTO: 9.7 FL (ref 9.4–12.3)
POTASSIUM SERPL-SCNC: 3.7 MMOL/L (ref 3.5–5.1)
PROT SERPL-MCNC: 7.7 G/DL (ref 6.3–8.2)
PROTHROMBIN TIME: 13.6 SEC (ref 12.6–14.5)
RBC # BLD AUTO: 4.73 M/UL (ref 4.23–5.6)
RBC # BLD AUTO: 4.88 M/UL (ref 4.23–5.6)
SODIUM SERPL-SCNC: 138 MMOL/L (ref 133–143)
WBC # BLD AUTO: 7.4 K/UL (ref 4.3–11.1)
WBC # BLD AUTO: 9.4 K/UL (ref 4.3–11.1)

## 2022-10-11 PROCEDURE — 85610 PROTHROMBIN TIME: CPT

## 2022-10-11 PROCEDURE — 85027 COMPLETE CBC AUTOMATED: CPT

## 2022-10-11 PROCEDURE — 6370000000 HC RX 637 (ALT 250 FOR IP): Performed by: SURGERY

## 2022-10-11 PROCEDURE — 1100000000 HC RM PRIVATE

## 2022-10-11 PROCEDURE — 85520 HEPARIN ASSAY: CPT

## 2022-10-11 PROCEDURE — 85025 COMPLETE CBC W/AUTO DIFF WBC: CPT

## 2022-10-11 PROCEDURE — 80053 COMPREHEN METABOLIC PANEL: CPT

## 2022-10-11 PROCEDURE — 85730 THROMBOPLASTIN TIME PARTIAL: CPT

## 2022-10-11 PROCEDURE — 6360000002 HC RX W HCPCS: Performed by: SURGERY

## 2022-10-11 PROCEDURE — 73706 CT ANGIO LWR EXTR W/O&W/DYE: CPT

## 2022-10-11 PROCEDURE — 99285 EMERGENCY DEPT VISIT HI MDM: CPT

## 2022-10-11 PROCEDURE — 6360000004 HC RX CONTRAST MEDICATION: Performed by: EMERGENCY MEDICINE

## 2022-10-11 RX ORDER — LOSARTAN POTASSIUM 25 MG/1
25 TABLET ORAL DAILY
Status: DISCONTINUED | OUTPATIENT
Start: 2022-10-11 | End: 2022-10-13 | Stop reason: HOSPADM

## 2022-10-11 RX ORDER — MORPHINE SULFATE 2 MG/ML
2 INJECTION, SOLUTION INTRAMUSCULAR; INTRAVENOUS
Status: DISCONTINUED | OUTPATIENT
Start: 2022-10-11 | End: 2022-10-12 | Stop reason: SDUPTHER

## 2022-10-11 RX ORDER — OXYCODONE AND ACETAMINOPHEN 7.5; 325 MG/1; MG/1
1 TABLET ORAL EVERY 6 HOURS PRN
Status: DISCONTINUED | OUTPATIENT
Start: 2022-10-11 | End: 2022-10-12 | Stop reason: SDUPTHER

## 2022-10-11 RX ORDER — HEPARIN SODIUM 10000 [USP'U]/100ML
5-30 INJECTION, SOLUTION INTRAVENOUS CONTINUOUS
Status: DISCONTINUED | OUTPATIENT
Start: 2022-10-11 | End: 2022-10-12

## 2022-10-11 RX ORDER — ACETAMINOPHEN 500 MG
500 TABLET ORAL EVERY 4 HOURS PRN
Status: DISCONTINUED | OUTPATIENT
Start: 2022-10-11 | End: 2022-10-13 | Stop reason: HOSPADM

## 2022-10-11 RX ORDER — ASPIRIN 81 MG/1
81 TABLET ORAL DAILY
Status: DISCONTINUED | OUTPATIENT
Start: 2022-10-11 | End: 2022-10-13 | Stop reason: HOSPADM

## 2022-10-11 RX ORDER — HEPARIN SODIUM 1000 [USP'U]/ML
5000 INJECTION, SOLUTION INTRAVENOUS; SUBCUTANEOUS ONCE
Status: COMPLETED | OUTPATIENT
Start: 2022-10-11 | End: 2022-10-11

## 2022-10-11 RX ORDER — HEPARIN SODIUM 1000 [USP'U]/ML
4000 INJECTION, SOLUTION INTRAVENOUS; SUBCUTANEOUS PRN
Status: DISCONTINUED | OUTPATIENT
Start: 2022-10-11 | End: 2022-10-13

## 2022-10-11 RX ORDER — HEPARIN SODIUM 1000 [USP'U]/ML
2000 INJECTION, SOLUTION INTRAVENOUS; SUBCUTANEOUS PRN
Status: DISCONTINUED | OUTPATIENT
Start: 2022-10-11 | End: 2022-10-13

## 2022-10-11 RX ADMIN — HEPARIN SODIUM 5000 UNITS: 1000 INJECTION INTRAVENOUS; SUBCUTANEOUS at 22:28

## 2022-10-11 RX ADMIN — LOSARTAN POTASSIUM 25 MG: 25 TABLET, FILM COATED ORAL at 21:49

## 2022-10-11 RX ADMIN — IOPAMIDOL 100 ML: 755 INJECTION, SOLUTION INTRAVENOUS at 18:15

## 2022-10-11 RX ADMIN — HEPARIN SODIUM 11 UNITS/KG/HR: 10000 INJECTION, SOLUTION INTRAVENOUS at 22:28

## 2022-10-11 RX ADMIN — MORPHINE SULFATE 2 MG: 2 INJECTION, SOLUTION INTRAMUSCULAR; INTRAVENOUS at 22:25

## 2022-10-11 ASSESSMENT — PAIN DESCRIPTION - LOCATION
LOCATION: LEG
LOCATION: LEG

## 2022-10-11 ASSESSMENT — PAIN DESCRIPTION - ONSET: ONSET: SUDDEN

## 2022-10-11 ASSESSMENT — PAIN - FUNCTIONAL ASSESSMENT
PAIN_FUNCTIONAL_ASSESSMENT: 0-10
PAIN_FUNCTIONAL_ASSESSMENT: PREVENTS OR INTERFERES SOME ACTIVE ACTIVITIES AND ADLS

## 2022-10-11 ASSESSMENT — PAIN DESCRIPTION - FREQUENCY: FREQUENCY: CONTINUOUS

## 2022-10-11 ASSESSMENT — PAIN DESCRIPTION - DESCRIPTORS: DESCRIPTORS: NUMBNESS;ACHING

## 2022-10-11 ASSESSMENT — PAIN SCALES - GENERAL
PAINLEVEL_OUTOF10: 2
PAINLEVEL_OUTOF10: 10
PAINLEVEL_OUTOF10: 8

## 2022-10-11 ASSESSMENT — PAIN DESCRIPTION - PAIN TYPE: TYPE: ACUTE PAIN

## 2022-10-11 ASSESSMENT — PAIN DESCRIPTION - ORIENTATION
ORIENTATION: RIGHT
ORIENTATION: RIGHT

## 2022-10-11 ASSESSMENT — ENCOUNTER SYMPTOMS
ABDOMINAL PAIN: 0
BACK PAIN: 0

## 2022-10-11 NOTE — ED NOTES
Report given to Nubia Parker RN. Care of patient transferred at this time.      Usama Crowell RN  10/11/22 0146

## 2022-10-11 NOTE — ED PROVIDER NOTES
Emergency Department Provider Note                   PCP:                CECIL Giraldo NP               Age: 63 y.o. Sex: male     No diagnosis found. DISPOSITION          MDM  Number of Diagnoses or Management Options  Diagnosis management comments: Since exam and history appear consistent with an acute sciatica or lumbar radiculopathy. However given the reduced pulse in the right foot with noted palpable pulses and recent office visit with Dr. Maria Luisa Ramey, will obtain CTA to evaluate patency of the arterial system. Amount and/or Complexity of Data Reviewed  Clinical lab tests: ordered and reviewed  Tests in the radiology section of CPT®: ordered and reviewed  Independent visualization of images, tracings, or specimens: yes    Risk of Complications, Morbidity, and/or Mortality  Presenting problems: moderate  Diagnostic procedures: moderate  Management options: moderate    Patient Progress  Patient progress: stable             Orders Placed This Encounter   Procedures    CTA LOWER EXTREMITY RIGHT W WO CONTRAST    CBC with Auto Differential    Comprehensive Metabolic Panel        Medications - No data to display    New Prescriptions    No medications on file        Ammon Pereira is a 63 y.o. male who presents to the Emergency Department with chief complaint of  No chief complaint on file. Patient with past medical history for hypertension and recent stenting of the right popliteal artery due to aneurysm presents complaining of sudden onset of right lower extremity pain and numbness. 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 He denies any back pain, or perineal paresthesias or difficulty urinating. He denies any history of back pain but does report a history of sciatica.     The history is provided by the patient and medical records. Review of Systems   Gastrointestinal:  Negative for abdominal pain. Musculoskeletal:  Negative for back pain. Neurological:  Positive for numbness. Negative for weakness. All other systems reviewed and are negative. Past Medical History:   Diagnosis Date    Diverticulosis of colon (without mention of hemorrhage) 2015    no symptoms    Dysrhythmia, cardiac     heart palpatations on occasion    Hypercholesterolemia     managed with meds    GABE on CPAP     central and obstructive, CPAP 9 cm H2O does not wear cpap    Personal history of colonic polyps 2015    adenoma, TVA    PVD (peripheral vascular disease) with claudication (HCC)     Snoring     Tobacco abuse         Past Surgical History:   Procedure Laterality Date    COLONOSCOPY  last 2/16/15    Uma--three trans TA, one spl flx TVA, rectal TVA--3 year recall    KNEE ARTHROSCOPY Left 1996    VASCULAR SURGERY Left 6-18-15    profundoplasty of profunda femoris artery    VASCULAR SURGERY Left 8/29/2022    RIGHT LOWER EXTREMITY ARTERIOGRAM WITH BALLOON ANGIOPLASTY AND STENT OF POPLITEAL ARTERY performed by Ann Marie Rodrigez MD at [facility] MAIN OR        Family History   Problem Relation Age of Onset    Diabetes Brother     Stroke Maternal Grandmother     Diabetes Brother     Heart Disease Brother     Hypertension Father     Heart Disease Mother     Hypertension Sister     Heart Disease Father     Diabetes Father     Diabetes Sister         Social History     Socioeconomic History    Marital status:    Tobacco Use    Smoking status: Every Day     Packs/day: 1.00     Years: 40.00     Pack years: 40.00     Types: Cigarettes    Smokeless tobacco: Never   Substance and Sexual Activity    Alcohol use: Not Currently         Patient has no known allergies.      Previous Medications    ASPIRIN 81 MG EC TABLET    Take 81 mg by mouth daily    CLOPIDOGREL (PLAVIX) 75 MG TABLET    Take 1 tablet by mouth daily    LOSARTAN (COZAAR) 25 MG TABLET    Take 25 mg by mouth daily    MULTIPLE VITAMIN (MULTIVITAMIN ADULT PO)    Take by mouth daily        Vitals signs and nursing note reviewed. Patient Vitals for the past 4 hrs:   Temp Pulse Resp BP SpO2   10/11/22 1419 98.6 °F (37 °C) 76 18 (!) 173/111 97 %          Physical Exam  Vitals and nursing note reviewed. Constitutional:       General: He is not in acute distress. Appearance: Normal appearance. He is not ill-appearing, toxic-appearing or diaphoretic. HENT:      Head: Normocephalic and atraumatic. Eyes:      Extraocular Movements: Extraocular movements intact. Conjunctiva/sclera: Conjunctivae normal.      Pupils: Pupils are equal, round, and reactive to light. Cardiovascular:      Rate and Rhythm: Normal rate. Comments: Pedal pulses are not palpable but obtainable by Doppler. The right foot is somewhat cooler than the left. Pulmonary:      Effort: Pulmonary effort is normal.   Abdominal:      General: There is no distension. Palpations: Abdomen is soft. Tenderness: There is no abdominal tenderness. Musculoskeletal:         General: Tenderness present. No signs of injury. Normal range of motion. Right lower leg: No edema. Left lower leg: No edema. Comments: Straight leg raise is negative however there is tenderness in the right gluteal area. Skin:     General: Skin is warm and dry. Capillary Refill: Capillary refill takes less than 2 seconds. Neurological:      General: No focal deficit present. Mental Status: He is alert and oriented to person, place, and time. Mental status is at baseline. Sensory: Sensory deficit present. Motor: No weakness.       Coordination: Coordination normal.      Gait: Gait normal.      Deep Tendon Reflexes: Reflexes normal.      Comments: Decree sensation in the right lower extremity compared to the left   Psychiatric:         Mood and Affect: Mood normal. Behavior: Behavior normal.         Thought Content: Thought content normal.        Procedures    Results for orders placed or performed during the hospital encounter of 10/11/22   CBC with Auto Differential   Result Value Ref Range    WBC 9.4 4.3 - 11.1 K/uL    RBC 4.88 4.23 - 5.6 M/uL    Hemoglobin 14.7 13.6 - 17.2 g/dL    Hematocrit 43.6 41.1 - 50.3 %    MCV 89.3 82 - 102 FL    MCH 30.1 26.1 - 32.9 PG    MCHC 33.7 31.4 - 35.0 g/dL    RDW 11.9 11.9 - 14.6 %    Platelets 519 582 - 632 K/uL    MPV 9.7 9.4 - 12.3 FL    nRBC 0.00 0.0 - 0.2 K/uL    Differential Type AUTOMATED      Seg Neutrophils 76 43 - 78 %    Lymphocytes 15 13 - 44 %    Monocytes 8 4.0 - 12.0 %    Eosinophils % 1 0.5 - 7.8 %    Basophils 0 0.0 - 2.0 %    Immature Granulocytes 0 0.0 - 5.0 %    Segs Absolute 7.1 1.7 - 8.2 K/UL    Absolute Lymph # 1.4 0.5 - 4.6 K/UL    Absolute Mono # 0.7 0.1 - 1.3 K/UL    Absolute Eos # 0.1 0.0 - 0.8 K/UL    Basophils Absolute 0.0 0.0 - 0.2 K/UL    Absolute Immature Granulocyte 0.0 0.0 - 0.5 K/UL   Comprehensive Metabolic Panel   Result Value Ref Range    Sodium 138 133 - 143 mmol/L    Potassium 3.7 3.5 - 5.1 mmol/L    Chloride 107 101 - 110 mmol/L    CO2 26 21 - 32 mmol/L    Anion Gap 5 2 - 11 mmol/L    Glucose 105 (H) 65 - 100 mg/dL    BUN 11 8 - 23 MG/DL    Creatinine 0.70 (L) 0.8 - 1.5 MG/DL    Est, Glom Filt Rate >60 >60 ml/min/1.73m2    Calcium 9.1 8.3 - 10.4 MG/DL    Total Bilirubin 0.5 0.2 - 1.1 MG/DL    ALT 19 12 - 65 U/L    AST 13 (L) 15 - 37 U/L    Alk Phosphatase 73 50 - 136 U/L    Total Protein 7.7 6.3 - 8.2 g/dL    Albumin 3.5 3.2 - 4.6 g/dL    Globulin 4.2 2.8 - 4.5 g/dL    Albumin/Globulin Ratio 0.8 0.4 - 1.6          CTA LOWER EXTREMITY RIGHT W WO CONTRAST    (Results Pending)                       Voice dictation software was used during the making of this note. This software is not perfect and grammatical and other typographical errors may be present.   This note has not been completely proofread for errors.      Miranda Major, DO  10/11/22 1725

## 2022-10-11 NOTE — ED TRIAGE NOTES
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 Stent placed into right leg 08/29 .  Currently on Abx for strep throat

## 2022-10-12 ENCOUNTER — ANESTHESIA EVENT (OUTPATIENT)
Dept: SURGERY | Age: 62
DRG: 315 | End: 2022-10-12
Payer: COMMERCIAL

## 2022-10-12 ENCOUNTER — ANESTHESIA (OUTPATIENT)
Dept: SURGERY | Age: 62
DRG: 315 | End: 2022-10-12
Payer: COMMERCIAL

## 2022-10-12 ENCOUNTER — APPOINTMENT (OUTPATIENT)
Dept: INTERVENTIONAL RADIOLOGY/VASCULAR | Age: 62
DRG: 315 | End: 2022-10-12
Payer: COMMERCIAL

## 2022-10-12 LAB
ANION GAP SERPL CALC-SCNC: 6 MMOL/L (ref 2–11)
APTT PPP: 41.8 SEC (ref 24.1–35.1)
BUN SERPL-MCNC: 9 MG/DL (ref 8–23)
CALCIUM SERPL-MCNC: 9 MG/DL (ref 8.3–10.4)
CHLORIDE SERPL-SCNC: 106 MMOL/L (ref 101–110)
CO2 SERPL-SCNC: 25 MMOL/L (ref 21–32)
CREAT SERPL-MCNC: 0.6 MG/DL (ref 0.8–1.5)
ERYTHROCYTE [DISTWIDTH] IN BLOOD BY AUTOMATED COUNT: 11.7 % (ref 11.9–14.6)
ERYTHROCYTE [DISTWIDTH] IN BLOOD BY AUTOMATED COUNT: 11.8 % (ref 11.9–14.6)
FIBRINOGEN PPP-MCNC: 460 MG/DL (ref 190–501)
GLUCOSE SERPL-MCNC: 127 MG/DL (ref 65–100)
HCT VFR BLD AUTO: 39.4 % (ref 41.1–50.3)
HCT VFR BLD AUTO: 41.6 % (ref 41.1–50.3)
HGB BLD-MCNC: 13.1 G/DL (ref 13.6–17.2)
HGB BLD-MCNC: 13.9 G/DL (ref 13.6–17.2)
MCH RBC QN AUTO: 29.8 PG (ref 26.1–32.9)
MCH RBC QN AUTO: 30 PG (ref 26.1–32.9)
MCHC RBC AUTO-ENTMCNC: 33.2 G/DL (ref 31.4–35)
MCHC RBC AUTO-ENTMCNC: 33.4 G/DL (ref 31.4–35)
MCV RBC AUTO: 89.7 FL (ref 82–102)
MCV RBC AUTO: 89.7 FL (ref 82–102)
NRBC # BLD: 0 K/UL (ref 0–0.2)
NRBC # BLD: 0 K/UL (ref 0–0.2)
PLATELET # BLD AUTO: 146 K/UL (ref 150–450)
PLATELET # BLD AUTO: 167 K/UL (ref 150–450)
PMV BLD AUTO: 9.7 FL (ref 9.4–12.3)
PMV BLD AUTO: 9.9 FL (ref 9.4–12.3)
POTASSIUM SERPL-SCNC: 3.8 MMOL/L (ref 3.5–5.1)
RBC # BLD AUTO: 4.39 M/UL (ref 4.23–5.6)
RBC # BLD AUTO: 4.64 M/UL (ref 4.23–5.6)
SODIUM SERPL-SCNC: 137 MMOL/L (ref 133–143)
UFH PPP CHRO-ACNC: <0.1 IU/ML (ref 0.3–0.7)
UFH PPP CHRO-ACNC: <0.1 IU/ML (ref 0.3–0.7)
WBC # BLD AUTO: 6.8 K/UL (ref 4.3–11.1)
WBC # BLD AUTO: 7.6 K/UL (ref 4.3–11.1)

## 2022-10-12 PROCEDURE — 85520 HEPARIN ASSAY: CPT

## 2022-10-12 PROCEDURE — 85027 COMPLETE CBC AUTOMATED: CPT

## 2022-10-12 PROCEDURE — 3600000007 HC SURGERY HYBRID BASE: Performed by: SURGERY

## 2022-10-12 PROCEDURE — 6360000004 HC RX CONTRAST MEDICATION: Performed by: SURGERY

## 2022-10-12 PROCEDURE — 3600000017 HC SURGERY HYBRID ADDL 15MIN: Performed by: SURGERY

## 2022-10-12 PROCEDURE — 85384 FIBRINOGEN ACTIVITY: CPT

## 2022-10-12 PROCEDURE — 2500000003 HC RX 250 WO HCPCS: Performed by: SURGERY

## 2022-10-12 PROCEDURE — 3600000012 HC SURGERY LEVEL 2 ADDTL 15MIN: Performed by: SURGERY

## 2022-10-12 PROCEDURE — 37211 THROMBOLYTIC ART THERAPY: CPT | Performed by: SURGERY

## 2022-10-12 PROCEDURE — 85730 THROMBOPLASTIN TIME PARTIAL: CPT

## 2022-10-12 PROCEDURE — 2580000003 HC RX 258: Performed by: ANESTHESIOLOGY

## 2022-10-12 PROCEDURE — 36415 COLL VENOUS BLD VENIPUNCTURE: CPT

## 2022-10-12 PROCEDURE — 6360000002 HC RX W HCPCS: Performed by: SURGERY

## 2022-10-12 PROCEDURE — 80048 BASIC METABOLIC PNL TOTAL CA: CPT

## 2022-10-12 PROCEDURE — 6370000000 HC RX 637 (ALT 250 FOR IP): Performed by: SURGERY

## 2022-10-12 PROCEDURE — C1894 INTRO/SHEATH, NON-LASER: HCPCS | Performed by: SURGERY

## 2022-10-12 PROCEDURE — 3600000002 HC SURGERY LEVEL 2 BASE: Performed by: SURGERY

## 2022-10-12 PROCEDURE — 6360000002 HC RX W HCPCS: Performed by: NURSE ANESTHETIST, CERTIFIED REGISTERED

## 2022-10-12 PROCEDURE — 36246 INS CATH ABD/L-EXT ART 2ND: CPT | Performed by: SURGERY

## 2022-10-12 PROCEDURE — 3700000001 HC ADD 15 MINUTES (ANESTHESIA): Performed by: SURGERY

## 2022-10-12 PROCEDURE — B41F1ZZ FLUOROSCOPY OF RIGHT LOWER EXTREMITY ARTERIES USING LOW OSMOLAR CONTRAST: ICD-10-PCS | Performed by: SURGERY

## 2022-10-12 PROCEDURE — 75710 ARTERY X-RAYS ARM/LEG: CPT | Performed by: SURGERY

## 2022-10-12 PROCEDURE — 3E05317 INTRODUCTION OF OTHER THROMBOLYTIC INTO PERIPHERAL ARTERY, PERCUTANEOUS APPROACH: ICD-10-PCS | Performed by: SURGERY

## 2022-10-12 PROCEDURE — C1769 GUIDE WIRE: HCPCS | Performed by: SURGERY

## 2022-10-12 PROCEDURE — 6370000000 HC RX 637 (ALT 250 FOR IP): Performed by: ANESTHESIOLOGY

## 2022-10-12 PROCEDURE — 99231 SBSQ HOSP IP/OBS SF/LOW 25: CPT | Performed by: SURGERY

## 2022-10-12 PROCEDURE — 2580000003 HC RX 258: Performed by: SURGERY

## 2022-10-12 PROCEDURE — 2100000000 HC CCU R&B

## 2022-10-12 PROCEDURE — 37211 THROMBOLYTIC ART THERAPY: CPT

## 2022-10-12 PROCEDURE — 3700000000 HC ANESTHESIA ATTENDED CARE: Performed by: SURGERY

## 2022-10-12 RX ORDER — SODIUM CHLORIDE, SODIUM LACTATE, POTASSIUM CHLORIDE, CALCIUM CHLORIDE 600; 310; 30; 20 MG/100ML; MG/100ML; MG/100ML; MG/100ML
INJECTION, SOLUTION INTRAVENOUS CONTINUOUS
Status: CANCELLED | OUTPATIENT
Start: 2022-10-12

## 2022-10-12 RX ORDER — MIDAZOLAM HYDROCHLORIDE 2 MG/2ML
2 INJECTION, SOLUTION INTRAMUSCULAR; INTRAVENOUS
Status: CANCELLED | OUTPATIENT
Start: 2022-10-12 | End: 2022-10-13

## 2022-10-12 RX ORDER — FENTANYL CITRATE 50 UG/ML
100 INJECTION, SOLUTION INTRAMUSCULAR; INTRAVENOUS
Status: CANCELLED | OUTPATIENT
Start: 2022-10-12 | End: 2022-10-13

## 2022-10-12 RX ORDER — SODIUM CHLORIDE 9 MG/ML
INJECTION, SOLUTION INTRAVENOUS PRN
Status: DISCONTINUED | OUTPATIENT
Start: 2022-10-12 | End: 2022-10-12 | Stop reason: HOSPADM

## 2022-10-12 RX ORDER — MORPHINE SULFATE 2 MG/ML
2 INJECTION, SOLUTION INTRAMUSCULAR; INTRAVENOUS
Status: DISCONTINUED | OUTPATIENT
Start: 2022-10-12 | End: 2022-10-13 | Stop reason: HOSPADM

## 2022-10-12 RX ORDER — SODIUM CHLORIDE 0.9 % (FLUSH) 0.9 %
5-40 SYRINGE (ML) INJECTION PRN
Status: DISCONTINUED | OUTPATIENT
Start: 2022-10-12 | End: 2022-10-13 | Stop reason: HOSPADM

## 2022-10-12 RX ORDER — MIDAZOLAM HYDROCHLORIDE 2 MG/2ML
2 INJECTION, SOLUTION INTRAMUSCULAR; INTRAVENOUS
Status: DISCONTINUED | OUTPATIENT
Start: 2022-10-12 | End: 2022-10-12 | Stop reason: HOSPADM

## 2022-10-12 RX ORDER — SODIUM CHLORIDE 9 MG/ML
INJECTION, SOLUTION INTRAVENOUS CONTINUOUS PRN
Status: DISCONTINUED | OUTPATIENT
Start: 2022-10-12 | End: 2022-10-13 | Stop reason: HOSPADM

## 2022-10-12 RX ORDER — HYDROMORPHONE HYDROCHLORIDE 2 MG/ML
0.5 INJECTION, SOLUTION INTRAMUSCULAR; INTRAVENOUS; SUBCUTANEOUS EVERY 10 MIN PRN
Status: DISCONTINUED | OUTPATIENT
Start: 2022-10-12 | End: 2022-10-13 | Stop reason: HOSPADM

## 2022-10-12 RX ORDER — ASPIRIN 81 MG/1
81 TABLET ORAL DAILY
Status: DISCONTINUED | OUTPATIENT
Start: 2022-10-12 | End: 2022-10-12 | Stop reason: SDUPTHER

## 2022-10-12 RX ORDER — HEPARIN SODIUM 200 [USP'U]/100ML
INJECTION, SOLUTION INTRAVENOUS CONTINUOUS PRN
Status: COMPLETED | OUTPATIENT
Start: 2022-10-12 | End: 2022-10-12

## 2022-10-12 RX ORDER — SODIUM CHLORIDE 0.9 % (FLUSH) 0.9 %
5-40 SYRINGE (ML) INJECTION EVERY 12 HOURS SCHEDULED
Status: DISCONTINUED | OUTPATIENT
Start: 2022-10-12 | End: 2022-10-13 | Stop reason: HOSPADM

## 2022-10-12 RX ORDER — DEXTROSE MONOHYDRATE 100 MG/ML
INJECTION, SOLUTION INTRAVENOUS CONTINUOUS PRN
Status: DISCONTINUED | OUTPATIENT
Start: 2022-10-12 | End: 2022-10-13 | Stop reason: HOSPADM

## 2022-10-12 RX ORDER — SODIUM CHLORIDE 9 MG/ML
INJECTION, SOLUTION INTRAVENOUS CONTINUOUS
Status: DISCONTINUED | OUTPATIENT
Start: 2022-10-12 | End: 2022-10-13 | Stop reason: HOSPADM

## 2022-10-12 RX ORDER — SODIUM CHLORIDE 0.9 % (FLUSH) 0.9 %
5-40 SYRINGE (ML) INJECTION PRN
Status: CANCELLED | OUTPATIENT
Start: 2022-10-12

## 2022-10-12 RX ORDER — SODIUM CHLORIDE 9 MG/ML
25 INJECTION, SOLUTION INTRAVENOUS PRN
Status: DISCONTINUED | OUTPATIENT
Start: 2022-10-12 | End: 2022-10-13 | Stop reason: HOSPADM

## 2022-10-12 RX ORDER — LIDOCAINE HYDROCHLORIDE 10 MG/ML
1 INJECTION, SOLUTION INFILTRATION; PERINEURAL
Status: CANCELLED | OUTPATIENT
Start: 2022-10-12 | End: 2022-10-13

## 2022-10-12 RX ORDER — MORPHINE SULFATE 2 MG/ML
2 INJECTION, SOLUTION INTRAMUSCULAR; INTRAVENOUS
Status: DISCONTINUED | OUTPATIENT
Start: 2022-10-12 | End: 2022-10-12

## 2022-10-12 RX ORDER — SODIUM CHLORIDE, SODIUM LACTATE, POTASSIUM CHLORIDE, CALCIUM CHLORIDE 600; 310; 30; 20 MG/100ML; MG/100ML; MG/100ML; MG/100ML
100 INJECTION, SOLUTION INTRAVENOUS CONTINUOUS
Status: CANCELLED | OUTPATIENT
Start: 2022-10-12

## 2022-10-12 RX ORDER — HEPARIN SODIUM 10000 [USP'U]/100ML
500 INJECTION, SOLUTION INTRAVENOUS CONTINUOUS
Status: DISCONTINUED | OUTPATIENT
Start: 2022-10-12 | End: 2022-10-13

## 2022-10-12 RX ORDER — OXYCODONE HYDROCHLORIDE 5 MG/1
5 TABLET ORAL
Status: CANCELLED | OUTPATIENT
Start: 2022-10-12 | End: 2022-10-13

## 2022-10-12 RX ORDER — SODIUM CHLORIDE 9 MG/ML
INJECTION, SOLUTION INTRAVENOUS PRN
Status: CANCELLED | OUTPATIENT
Start: 2022-10-12

## 2022-10-12 RX ORDER — MIDAZOLAM HYDROCHLORIDE 1 MG/ML
INJECTION INTRAMUSCULAR; INTRAVENOUS PRN
Status: DISCONTINUED | OUTPATIENT
Start: 2022-10-12 | End: 2022-10-12 | Stop reason: SDUPTHER

## 2022-10-12 RX ORDER — HYDRALAZINE HYDROCHLORIDE 20 MG/ML
10 INJECTION INTRAMUSCULAR; INTRAVENOUS EVERY 4 HOURS PRN
Status: DISCONTINUED | OUTPATIENT
Start: 2022-10-12 | End: 2022-10-13 | Stop reason: HOSPADM

## 2022-10-12 RX ORDER — SODIUM CHLORIDE 0.9 % (FLUSH) 0.9 %
5-40 SYRINGE (ML) INJECTION EVERY 12 HOURS SCHEDULED
Status: CANCELLED | OUTPATIENT
Start: 2022-10-12

## 2022-10-12 RX ORDER — DEXTROSE MONOHYDRATE 100 MG/ML
INJECTION, SOLUTION INTRAVENOUS CONTINUOUS PRN
Status: CANCELLED | OUTPATIENT
Start: 2022-10-12

## 2022-10-12 RX ORDER — ACETAMINOPHEN 500 MG
1000 TABLET ORAL ONCE
Status: COMPLETED | OUTPATIENT
Start: 2022-10-12 | End: 2022-10-12

## 2022-10-12 RX ORDER — LIDOCAINE HYDROCHLORIDE 10 MG/ML
INJECTION, SOLUTION INFILTRATION; PERINEURAL PRN
Status: DISCONTINUED | OUTPATIENT
Start: 2022-10-12 | End: 2022-10-12 | Stop reason: HOSPADM

## 2022-10-12 RX ORDER — DIPHENHYDRAMINE HYDROCHLORIDE 50 MG/ML
12.5 INJECTION INTRAMUSCULAR; INTRAVENOUS
Status: DISCONTINUED | OUTPATIENT
Start: 2022-10-12 | End: 2022-10-13 | Stop reason: HOSPADM

## 2022-10-12 RX ORDER — LIDOCAINE HYDROCHLORIDE 10 MG/ML
1 INJECTION, SOLUTION INFILTRATION; PERINEURAL
Status: DISCONTINUED | OUTPATIENT
Start: 2022-10-12 | End: 2022-10-12 | Stop reason: HOSPADM

## 2022-10-12 RX ORDER — PROCHLORPERAZINE EDISYLATE 5 MG/ML
5 INJECTION INTRAMUSCULAR; INTRAVENOUS
Status: CANCELLED | OUTPATIENT
Start: 2022-10-12 | End: 2022-10-13

## 2022-10-12 RX ORDER — SODIUM CHLORIDE 0.9 % (FLUSH) 0.9 %
5-40 SYRINGE (ML) INJECTION EVERY 12 HOURS SCHEDULED
Status: DISCONTINUED | OUTPATIENT
Start: 2022-10-12 | End: 2022-10-12 | Stop reason: HOSPADM

## 2022-10-12 RX ORDER — OXYCODONE HYDROCHLORIDE 5 MG/1
5 TABLET ORAL
Status: DISCONTINUED | OUTPATIENT
Start: 2022-10-12 | End: 2022-10-13 | Stop reason: HOSPADM

## 2022-10-12 RX ORDER — SODIUM CHLORIDE, SODIUM LACTATE, POTASSIUM CHLORIDE, CALCIUM CHLORIDE 600; 310; 30; 20 MG/100ML; MG/100ML; MG/100ML; MG/100ML
INJECTION, SOLUTION INTRAVENOUS CONTINUOUS
Status: DISCONTINUED | OUTPATIENT
Start: 2022-10-12 | End: 2022-10-12 | Stop reason: HOSPADM

## 2022-10-12 RX ORDER — OXYCODONE HYDROCHLORIDE AND ACETAMINOPHEN 5; 325 MG/1; MG/1
1 TABLET ORAL EVERY 4 HOURS PRN
Status: DISCONTINUED | OUTPATIENT
Start: 2022-10-12 | End: 2022-10-13 | Stop reason: HOSPADM

## 2022-10-12 RX ORDER — PROCHLORPERAZINE EDISYLATE 5 MG/ML
5 INJECTION INTRAMUSCULAR; INTRAVENOUS
Status: DISCONTINUED | OUTPATIENT
Start: 2022-10-12 | End: 2022-10-13 | Stop reason: HOSPADM

## 2022-10-12 RX ORDER — SODIUM CHLORIDE, SODIUM LACTATE, POTASSIUM CHLORIDE, CALCIUM CHLORIDE 600; 310; 30; 20 MG/100ML; MG/100ML; MG/100ML; MG/100ML
INJECTION, SOLUTION INTRAVENOUS CONTINUOUS
Status: DISCONTINUED | OUTPATIENT
Start: 2022-10-12 | End: 2022-10-13 | Stop reason: HOSPADM

## 2022-10-12 RX ORDER — FENTANYL CITRATE 50 UG/ML
100 INJECTION, SOLUTION INTRAMUSCULAR; INTRAVENOUS
Status: DISCONTINUED | OUTPATIENT
Start: 2022-10-12 | End: 2022-10-12 | Stop reason: HOSPADM

## 2022-10-12 RX ORDER — SODIUM CHLORIDE 9 MG/ML
INJECTION, SOLUTION INTRAVENOUS PRN
Status: DISCONTINUED | OUTPATIENT
Start: 2022-10-12 | End: 2022-10-13 | Stop reason: HOSPADM

## 2022-10-12 RX ORDER — HYDROMORPHONE HYDROCHLORIDE 2 MG/ML
0.5 INJECTION, SOLUTION INTRAMUSCULAR; INTRAVENOUS; SUBCUTANEOUS EVERY 10 MIN PRN
Status: CANCELLED | OUTPATIENT
Start: 2022-10-12

## 2022-10-12 RX ORDER — DIPHENHYDRAMINE HYDROCHLORIDE 50 MG/ML
12.5 INJECTION INTRAMUSCULAR; INTRAVENOUS
Status: CANCELLED | OUTPATIENT
Start: 2022-10-12 | End: 2022-10-13

## 2022-10-12 RX ORDER — SODIUM CHLORIDE 0.9 % (FLUSH) 0.9 %
5-40 SYRINGE (ML) INJECTION PRN
Status: DISCONTINUED | OUTPATIENT
Start: 2022-10-12 | End: 2022-10-12 | Stop reason: HOSPADM

## 2022-10-12 RX ORDER — PROPOFOL 10 MG/ML
INJECTION, EMULSION INTRAVENOUS PRN
Status: DISCONTINUED | OUTPATIENT
Start: 2022-10-12 | End: 2022-10-12 | Stop reason: SDUPTHER

## 2022-10-12 RX ADMIN — MORPHINE SULFATE 2 MG: 2 INJECTION, SOLUTION INTRAMUSCULAR; INTRAVENOUS at 18:54

## 2022-10-12 RX ADMIN — FENTANYL CITRATE 25 MCG: 50 INJECTION INTRAMUSCULAR; INTRAVENOUS at 15:01

## 2022-10-12 RX ADMIN — PROPOFOL 150 MCG/KG/MIN: 10 INJECTION, EMULSION INTRAVENOUS at 14:50

## 2022-10-12 RX ADMIN — FENTANYL CITRATE 25 MCG: 50 INJECTION INTRAMUSCULAR; INTRAVENOUS at 15:02

## 2022-10-12 RX ADMIN — MORPHINE SULFATE 2 MG: 2 INJECTION, SOLUTION INTRAMUSCULAR; INTRAVENOUS at 22:11

## 2022-10-12 RX ADMIN — Medication 2000 MG: at 23:04

## 2022-10-12 RX ADMIN — MIDAZOLAM 2 MG: 1 INJECTION INTRAMUSCULAR; INTRAVENOUS at 14:57

## 2022-10-12 RX ADMIN — PROPOFOL 20 MG: 10 INJECTION, EMULSION INTRAVENOUS at 15:01

## 2022-10-12 RX ADMIN — FENTANYL CITRATE 25 MCG: 50 INJECTION INTRAMUSCULAR; INTRAVENOUS at 14:59

## 2022-10-12 RX ADMIN — MORPHINE SULFATE 2 MG: 2 INJECTION, SOLUTION INTRAMUSCULAR; INTRAVENOUS at 04:30

## 2022-10-12 RX ADMIN — HEPARIN SODIUM 15 UNITS/KG/HR: 10000 INJECTION, SOLUTION INTRAVENOUS at 11:55

## 2022-10-12 RX ADMIN — SODIUM CHLORIDE: 9 INJECTION, SOLUTION INTRAVENOUS at 16:53

## 2022-10-12 RX ADMIN — SODIUM CHLORIDE, SODIUM LACTATE, POTASSIUM CHLORIDE, AND CALCIUM CHLORIDE: 600; 310; 30; 20 INJECTION, SOLUTION INTRAVENOUS at 14:45

## 2022-10-12 RX ADMIN — OXYCODONE HYDROCHLORIDE AND ACETAMINOPHEN 1 TABLET: 5; 325 TABLET ORAL at 17:17

## 2022-10-12 RX ADMIN — ACETAMINOPHEN 1000 MG: 500 TABLET, FILM COATED ORAL at 13:04

## 2022-10-12 RX ADMIN — MORPHINE SULFATE 2 MG: 2 INJECTION, SOLUTION INTRAMUSCULAR; INTRAVENOUS at 08:27

## 2022-10-12 RX ADMIN — PROPOFOL 50 MG: 10 INJECTION, EMULSION INTRAVENOUS at 14:49

## 2022-10-12 RX ADMIN — HEPARIN SODIUM 4000 UNITS: 1000 INJECTION INTRAVENOUS; SUBCUTANEOUS at 06:02

## 2022-10-12 RX ADMIN — Medication 2 G: at 14:56

## 2022-10-12 RX ADMIN — PROPOFOL 20 MG: 10 INJECTION, EMULSION INTRAVENOUS at 14:59

## 2022-10-12 RX ADMIN — ASPIRIN 81 MG: 81 TABLET ORAL at 08:22

## 2022-10-12 RX ADMIN — HYDRALAZINE HYDROCHLORIDE 10 MG: 20 INJECTION INTRAMUSCULAR; INTRAVENOUS at 18:54

## 2022-10-12 RX ADMIN — FENTANYL CITRATE 25 MCG: 50 INJECTION INTRAMUSCULAR; INTRAVENOUS at 14:57

## 2022-10-12 RX ADMIN — MORPHINE SULFATE 2 MG: 2 INJECTION, SOLUTION INTRAMUSCULAR; INTRAVENOUS at 16:49

## 2022-10-12 ASSESSMENT — PAIN DESCRIPTION - DESCRIPTORS
DESCRIPTORS: ACHING
DESCRIPTORS: ACHING
DESCRIPTORS: CRAMPING;BURNING
DESCRIPTORS: ACHING
DESCRIPTORS: ACHING
DESCRIPTORS: ACHING;NUMBNESS;SHARP

## 2022-10-12 ASSESSMENT — PAIN DESCRIPTION - LOCATION
LOCATION: ANKLE
LOCATION: LEG
LOCATION: LEG
LOCATION: FOOT
LOCATION: ANKLE

## 2022-10-12 ASSESSMENT — PAIN SCALES - GENERAL
PAINLEVEL_OUTOF10: 0
PAINLEVEL_OUTOF10: 2
PAINLEVEL_OUTOF10: 7
PAINLEVEL_OUTOF10: 8
PAINLEVEL_OUTOF10: 2
PAINLEVEL_OUTOF10: 9
PAINLEVEL_OUTOF10: 8
PAINLEVEL_OUTOF10: 2
PAINLEVEL_OUTOF10: 8

## 2022-10-12 ASSESSMENT — PAIN DESCRIPTION - ORIENTATION
ORIENTATION: LEFT
ORIENTATION: LEFT
ORIENTATION: RIGHT
ORIENTATION: LEFT
ORIENTATION: RIGHT

## 2022-10-12 ASSESSMENT — PAIN DESCRIPTION - PAIN TYPE: TYPE: ACUTE PAIN

## 2022-10-12 ASSESSMENT — LIFESTYLE VARIABLES
SMOKING_STATUS: 1
SMOKING_STATUS: 1

## 2022-10-12 ASSESSMENT — PAIN - FUNCTIONAL ASSESSMENT
PAIN_FUNCTIONAL_ASSESSMENT: 0-10
PAIN_FUNCTIONAL_ASSESSMENT: PREVENTS OR INTERFERES SOME ACTIVE ACTIVITIES AND ADLS
PAIN_FUNCTIONAL_ASSESSMENT: ACTIVITIES ARE NOT PREVENTED

## 2022-10-12 ASSESSMENT — PAIN DESCRIPTION - FREQUENCY: FREQUENCY: CONTINUOUS

## 2022-10-12 ASSESSMENT — PAIN DESCRIPTION - ONSET: ONSET: ON-GOING

## 2022-10-12 NOTE — PERIOP NOTE
CV stepdown staff informed of patient's procedure on 10/13 with Dr. Renaldo Carlson. OR time of 0730 with pre-op arrival of 0515.

## 2022-10-12 NOTE — BRIEF OP NOTE
[place] [id number]  [phone] FAX: [phone]    Brief Op Note Template Note    Pre-Op Diagnosis: Right popliteal artery occlusion ([address] [I70.201]    Post-Op Diagnosis:  * No post-op diagnosis entered *    Procedures: Procedure(s):  RIGHT LEG ARTERIOGRAM POSSIBLE THROMBOLYSIS    Surgeon: Antonio Curry MD    Assistants: Surgeon(s):  Nat Alonzo MD      Anesthesia:  General     Findings: Right popliteal artery stent occluded    Tourniquet Time:  * No tourniquets in log *    Estimated Blood Loss:               Specimens:            Implants:  * No implants in log *    Complications: None               Signed: Antonio Curry MD      Elements of this note have been dictated using speech recognition software. As a result, errors of speech recognition may have occurred.

## 2022-10-12 NOTE — ANESTHESIA PRE PROCEDURE
Department of Anesthesiology  Preprocedure Note       Name:  Óscar Martinez. Age:  63 y.o.  :  1960                                          MRN:  [id number]         Date:  10/12/2022      Surgeon: Juanita Nick):  Vianey Deshpande MD    Procedure: Procedure(s):  ARTERIOGRAM RIGHT LEG LYSIS RECHECK    Medications prior to admission:   Prior to Admission medications    Medication Sig Start Date End Date Taking? Authorizing Provider   losartan (COZAAR) 25 MG tablet Take 25 mg by mouth daily    Historical Provider, MD   clopidogrel (PLAVIX) 75 MG tablet Take 1 tablet by mouth daily 22  Caro Narrow Standard, APRN - CNP   Multiple Vitamin (MULTIVITAMIN ADULT PO) Take by mouth daily    Historical Provider, MD   aspirin 81 MG EC tablet Take 81 mg by mouth daily    Ar Automatic Reconciliation       Current medications:    No current facility-administered medications for this visit. No current outpatient medications on file.      Facility-Administered Medications Ordered in Other Visits   Medication Dose Route Frequency Provider Last Rate Last Admin    lactated ringers infusion   IntraVENous Continuous Aquilino Do MD        sodium chloride flush 0.9 % injection 5-40 mL  5-40 mL IntraVENous 2 times per day Aquilino Do MD        sodium chloride flush 0.9 % injection 5-40 mL  5-40 mL IntraVENous PRN Aquilino Do MD        0.9 % sodium chloride infusion   IntraVENous PRN Aquilino Do MD        HYDROmorphone HCl PF (DILAUDID) injection 0.5 mg  0.5 mg IntraVENous Q10 Min PRN Aquilino Do MD        oxyCODONE (ROXICODONE) immediate release tablet 5 mg  5 mg Oral Once PRN Aquilino Do MD        prochlorperazine (COMPAZINE) injection 5 mg  5 mg IntraVENous Once PRN Aquilino Do MD        diphenhydrAMINE (BENADRYL) injection 12.5 mg  12.5 mg IntraVENous Once PRN Aquilino Do MD        glucose chewable tablet 16 g  4 tablet Oral PRN Aquilino Do MD        dextrose bolus 10% 125 mL 125 mL IntraVENous PRN Russ Aguayo MD        Or    dextrose bolus 10% 250 mL  250 mL IntraVENous PRN Russ Aguayo MD        glucagon (rDNA) injection 1 mg  1 mg SubCUTAneous PRN Russ Aguayo MD        dextrose 10 % infusion   IntraVENous Continuous PRN Russ Aguayo MD        alteplase (CATHFLO) 12 mg in sodium chloride 0.9 % 250 mL infusion  1 mg/hr Intra-arTERial Continuous Asa Aguilar MD        sodium chloride flush 0.9 % injection 5-40 mL  5-40 mL IntraVENous 2 times per day Asa Aguilar MD        sodium chloride flush 0.9 % injection 5-40 mL  5-40 mL IntraVENous PRN Asa Aguilar MD        0.9 % sodium chloride infusion  25 mL IntraVENous PRN Asa Aguilar MD        0.9 % sodium chloride infusion   IntraVENous Continuous PRN Asa Aguilar MD        oxyCODONE-acetaminophen (PERCOCET) 5-325 MG per tablet 1 tablet  1 tablet Oral Q4H PRN Asa Aguilar MD   1 tablet at 10/12/22 1717    morphine injection 2 mg  2 mg IntraVENous Q2H PRN Asa Aguilar MD   2 mg at 10/12/22 1649    0.9 % sodium chloride infusion   IntraVENous Continuous Asa Aguilar MD 75 mL/hr at 10/12/22 1653 New Bag at 10/12/22 1653    ceFAZolin (ANCEF) 2000 mg in sterile water 20 mL IV syringe  2,000 mg IntraVENous Q8H Asa Aguilar MD        heparin 25,000 units in dextrose 5% 250 mL (premix) infusion  500 Units/hr IntraVENous Continuous Asa Aguilar MD 5 mL/hr at 10/12/22 1700 500 Units/hr at 10/12/22 1700    losartan (COZAAR) tablet 25 mg  25 mg Oral Daily Roselia Noyola MD   25 mg at 10/11/22 2149    acetaminophen (TYLENOL) tablet 500 mg  500 mg Oral Q4H PRN Roselia Noyola MD        aspirin EC tablet 81 mg  81 mg Oral Daily Roselia Noyola MD   81 mg at 10/12/22 2544    heparin (porcine) injection 4,000 Units  4,000 Units IntraVENous PRN Roselia Noyola MD   4,000 Units at 10/12/22 0602    heparin (porcine) injection 2,000 Units  2,000 Units IntraVENous PRN Roselia Noyola MD Allergies:  No Known Allergies    Problem List:    Patient Active Problem List   Diagnosis Code    Hypercholesterolemia E78.00    Snoring R06.83    Atherosclerosis of native artery of extremity with intermittent claudication (HCC) I70.219    Sleep apnea G47.30    Peripheral artery disease (HCC) I73.9    Dysrhythmia, cardiac I49.9    Popliteal artery aneurysm, bilateral (HCC) I72.4    Popliteal artery occlusion, 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    Non-recurrent bilateral inguinal hernia without obstruction or gangrene K40.20    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       Past Medical History:        Diagnosis Date    Diverticulosis of colon (without mention of hemorrhage) 2015    no symptoms    Dysrhythmia, cardiac     heart palpatations on occasion    Hypercholesterolemia     managed with meds    GABE on CPAP     central and obstructive, CPAP 9 cm H2O does not wear cpap    Personal history of colonic polyps 2015    adenoma, TVA    PVD (peripheral vascular disease) with claudication ([facility] [address]     Snoring     Tobacco abuse        Past Surgical History:        Procedure Laterality Date    COLONOSCOPY  last 2/16/15    Uma--three trans TA, one spl flx TVA, rectal TVA--3 year recall    KNEE ARTHROSCOPY Left 1996    VASCULAR SURGERY Left 6-18-15    profundoplasty of profunda femoris artery    VASCULAR SURGERY Left 8/29/2022    RIGHT LOWER EXTREMITY ARTERIOGRAM WITH BALLOON ANGIOPLASTY AND STENT OF POPLITEAL ARTERY performed by Jack Garbiel MD at [facility] MAIN OR       Social History:    Social History     Tobacco Use    Smoking status: Every Day     Packs/day: 1.00     Years: 40.00     Pack years: 40.00     Types: Cigarettes    Smokeless tobacco: Never   Substance Use Topics    Alcohol use: Not Currently                                Ready to quit: Not Answered  Counseling given: Not Answered      Vital Signs (Current): There were no vitals filed for this visit. BP Readings from Last 3 Encounters:   10/12/22 (!) 188/91   10/07/22 (!) 164/93   08/29/22 (!) 158/84       NPO Status:                                                                                 BMI:   Wt Readings from Last 3 Encounters:   10/11/22 194 lb (88 kg)   10/07/22 185 lb (83.9 kg)   08/29/22 187 lb (84.8 kg)     There is no height or weight on file to calculate BMI.    CBC:   Lab Results   Component Value Date/Time    WBC 7.4 10/11/2022 10:07 PM    RBC 4.73 10/11/2022 10:07 PM    HGB 14.0 10/11/2022 10:07 PM    HCT 42.4 10/11/2022 10:07 PM    MCV 89.6 10/11/2022 10:07 PM    RDW 11.8 10/11/2022 10:07 PM     10/11/2022 10:07 PM       CMP:   Lab Results   Component Value Date/Time     10/11/2022 02:22 PM    K 3.7 10/11/2022 02:22 PM     10/11/2022 02:22 PM    CO2 26 10/11/2022 02:22 PM    BUN 11 10/11/2022 02:22 PM    CREATININE 0.70 10/11/2022 02:22 PM    GFRAA >60 10/23/2019 08:38 AM    LABGLOM >60 10/11/2022 02:22 PM    GLUCOSE 105 10/11/2022 02:22 PM    PROT 7.7 10/11/2022 02:22 PM    CALCIUM 9.1 10/11/2022 02:22 PM    BILITOT 0.5 10/11/2022 02:22 PM    ALKPHOS 73 10/11/2022 02:22 PM    AST 13 10/11/2022 02:22 PM    ALT 19 10/11/2022 02:22 PM       POC Tests: No results for input(s): POCGLU, POCNA, POCK, POCCL, POCBUN, POCHEMO, POCHCT in the last 72 hours.     Coags:   Lab Results   Component Value Date/Time    PROTIME 13.6 10/11/2022 10:08 PM    INR 1.0 10/11/2022 10:08 PM    APTT 28.0 10/11/2022 10:08 PM       HCG (If Applicable): No results found for: PREGTESTUR, PREGSERUM, HCG, HCGQUANT     ABGs: No results found for: PHART, PO2ART, QTZ1UUN, EUH7XIM, BEART, Q1JNPMFI     Type & Screen (If Applicable):  No results found for: LABABO, LABRH    Drug/Infectious Status (If Applicable):  No results found for: HIV, HEPCAB    COVID-19 Screening (If Applicable): No results found for: COVID19        Anesthesia Evaluation  Patient summary reviewed and Nursing notes reviewed no history of anesthetic complications:   Airway: Mallampati: I  TM distance: >3 FB   Neck ROM: full  Mouth opening: > = 3 FB   Dental:    (+) upper dentures      Pulmonary: breath sounds clear to auscultation  (+) sleep apnea: on CPAP,  current smoker (Every Day - 40 pack years)                           Cardiovascular:  Exercise tolerance: poor (<4 METS),   (+) dysrhythmias (heart palpatations on occasion):, hyperlipidemia    (-) past MI      Rhythm: regular  Rate: normal                 ROS comment: EKG 8/2022 -  Sinus bradycardia 56 bpm     Neuro/Psych:   Negative Neuro/Psych ROS     (-) CVA           GI/Hepatic/Renal:        (-) GERD      ROS comment: LE angioplasty and s/p lysis - now for recheck . Endo/Other: Negative Endo/Other ROS                    Abdominal:             Vascular:   + PVD, aortic or cerebral, . ROS comment: Atherosclerosis of native artery of extremity with intermittent claudication     Popliteal artery aneurysm, bilateral  Popliteal artery occlusion, left     . Other Findings:             Anesthesia Plan      TIVA     ASA 3     (Underwent uneventful R leg arteriogram under TIVA 10/12. On heparin and alteplase gtts. Plan for TIVA with general as backup plan)  Induction: intravenous. Anesthetic plan and risks discussed with patient.                         Krys Miranda MD   10/12/2022

## 2022-10-12 NOTE — CARE COORDINATION
Attempted to complete assessment this morning. Pt off floor for procedure. Chart screened by  for discharge planning. No needs identified at this time. Please consult  if any new issues arise.        10/11/22 0126   Service Assessment   Support Systems Spouse/Significant Other;Family Members;Friends/Neighbors   Discharge Planning   Type of Residence House   Living Arrangements Spouse/Significant Other   Current Services Prior To Admission None   Potential Assistance Needed N/A   Type of Home Care Services None   Patient expects to be discharged to: House   Condition of Participation: Discharge Planning   The Plan for Transition of Care is related to the following treatment goals: Pending clinical progress

## 2022-10-12 NOTE — ANESTHESIA PRE PROCEDURE
Department of Anesthesiology  Preprocedure Note       Name:  Daya Turcios Age:  63 y.o.  :  1960                                          MRN:  [id number]         Date:  10/12/2022      Surgeon: Juana Roldan):  Clemente Avilez MD    Procedure: Procedure(s):  RIGHT LEG ARTERIOGRAM POSSIBLE THROMBOLYSIS    Medications prior to admission:   Prior to Admission medications    Medication Sig Start Date End Date Taking? Authorizing Provider   losartan (COZAAR) 25 MG tablet Take 25 mg by mouth daily    Historical Provider, MD   clopidogrel (PLAVIX) 75 MG tablet Take 1 tablet by mouth daily 22  [facility] Standard, APRN - CNP   Multiple Vitamin (MULTIVITAMIN ADULT PO) Take by mouth daily    Historical Provider, MD   aspirin 81 MG EC tablet Take 81 mg by mouth daily    Ar Automatic Reconciliation       Current medications:    No current facility-administered medications for this visit. No current outpatient medications on file.      Facility-Administered Medications Ordered in Other Visits   Medication Dose Route Frequency Provider Last Rate Last Admin    losartan (COZAAR) tablet 25 mg  25 mg Oral Daily Emily Roche MD   25 mg at 10/11/22 2149    acetaminophen (TYLENOL) tablet 500 mg  500 mg Oral Q4H PRN Emily Roche MD        aspirin EC tablet 81 mg  81 mg Oral Daily Emily Roche MD   81 mg at 10/12/22 7956    heparin (porcine) injection 4,000 Units  4,000 Units IntraVENous PRN Emily Roche MD   4,000 Units at 10/12/22 0602    heparin (porcine) injection 2,000 Units  2,000 Units IntraVENous PRN Emily Roche MD        heparin 25,000 units in dextrose 5% 250 mL (premix) infusion  5-30 Units/kg/hr IntraVENous Continuous Emily Roche MD 13.2 mL/hr at 10/12/22 0608 15 Units/kg/hr at 10/12/22 0608    oxyCODONE-acetaminophen (PERCOCET) 7.5-325 MG per tablet 1 tablet  1 tablet Oral Q6H PRN Emily Roche MD        morphine injection 2 mg  2 mg IntraVENous Q3H PRN Nani Frank Grace Kothari MD   2 mg at 10/12/22 0827       Allergies:  No Known Allergies    Problem List:    Patient Active Problem List   Diagnosis Code    Hypercholesterolemia E78.00    Snoring R06.83    Atherosclerosis of native artery of extremity with intermittent claudication (HCC) I70.219    Sleep apnea G47.30    Peripheral artery disease (HCC) I73.9    Dysrhythmia, cardiac I49.9    Popliteal artery aneurysm, bilateral (HCC) I72.4    Popliteal artery occlusion, 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    Non-recurrent bilateral inguinal hernia without obstruction or gangrene K40.20    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       Past Medical History:        Diagnosis Date    Diverticulosis of colon (without mention of hemorrhage) 2015    no symptoms    Dysrhythmia, cardiac     heart palpatations on occasion    Hypercholesterolemia     managed with meds    GABE on CPAP     central and obstructive, CPAP 9 cm H2O does not wear cpap    Personal history of colonic polyps 2015    adenoma, TVA    PVD (peripheral vascular disease) with claudication ([address]     Snoring     Tobacco abuse        Past Surgical History:        Procedure Laterality Date    COLONOSCOPY  last 2/16/15    Uma--three trans TA, one spl flx TVA, rectal TVA--3 year recall    KNEE ARTHROSCOPY Left 1996    VASCULAR SURGERY Left 6-18-15    profundoplasty of profunda femoris artery    VASCULAR SURGERY Left 8/29/2022    RIGHT LOWER EXTREMITY ARTERIOGRAM WITH BALLOON ANGIOPLASTY AND STENT OF POPLITEAL ARTERY performed by Surya Foley MD at [facility] MAIN OR       Social History:    Social History     Tobacco Use    Smoking status: Every Day     Packs/day: 1.00     Years: 40.00     Pack years: 40.00     Types: Cigarettes    Smokeless tobacco: Never   Substance Use Topics    Alcohol use: Not Currently                                Ready to quit: Not Answered  Counseling given: Not Answered      Vital Signs (Current):    There were no vitals filed for this visit.                                           BP Readings from Last 3 Encounters:   10/12/22 (!) 149/84   10/07/22 (!) 164/93   08/29/22 (!) 158/84       NPO Status:                                                                                 BMI:   Wt Readings from Last 3 Encounters:   10/11/22 194 lb (88 kg)   10/07/22 185 lb (83.9 kg)   08/29/22 187 lb (84.8 kg)     There is no height or weight on file to calculate BMI.    CBC:   Lab Results   Component Value Date/Time    WBC 7.4 10/11/2022 10:07 PM    RBC 4.73 10/11/2022 10:07 PM    HGB 14.0 10/11/2022 10:07 PM    HCT 42.4 10/11/2022 10:07 PM    MCV 89.6 10/11/2022 10:07 PM    RDW 11.8 10/11/2022 10:07 PM     10/11/2022 10:07 PM       CMP:   Lab Results   Component Value Date/Time     10/11/2022 02:22 PM    K 3.7 10/11/2022 02:22 PM     10/11/2022 02:22 PM    CO2 26 10/11/2022 02:22 PM    BUN 11 10/11/2022 02:22 PM    CREATININE 0.70 10/11/2022 02:22 PM    GFRAA >60 10/23/2019 08:38 AM    LABGLOM >60 10/11/2022 02:22 PM    GLUCOSE 105 10/11/2022 02:22 PM    PROT 7.7 10/11/2022 02:22 PM    CALCIUM 9.1 10/11/2022 02:22 PM    BILITOT 0.5 10/11/2022 02:22 PM    ALKPHOS 73 10/11/2022 02:22 PM    AST 13 10/11/2022 02:22 PM    ALT 19 10/11/2022 02:22 PM       POC Tests: No results for input(s): POCGLU, POCNA, POCK, POCCL, POCBUN, POCHEMO, POCHCT in the last 72 hours.     Coags:   Lab Results   Component Value Date/Time    PROTIME 13.6 10/11/2022 10:08 PM    INR 1.0 10/11/2022 10:08 PM    APTT 28.0 10/11/2022 10:08 PM       HCG (If Applicable): No results found for: PREGTESTUR, PREGSERUM, HCG, HCGQUANT     ABGs: No results found for: PHART, PO2ART, FEJ9FDX, YNP9DLM, BEART, G1BDFOFM     Type & Screen (If Applicable):  No results found for: LABABO, LABRH    Drug/Infectious Status (If Applicable):  No results found for: HIV, HEPCAB    COVID-19 Screening (If Applicable): No results found for: COVID19        Anesthesia Evaluation  Patient summary reviewed and Nursing notes reviewed no history of anesthetic complications:   Airway: Mallampati: I  TM distance: >3 FB   Neck ROM: full  Mouth opening: > = 3 FB   Dental:    (+) upper dentures      Pulmonary: breath sounds clear to auscultation  (+) sleep apnea: on CPAP,  current smoker (Every Day - 40 pack years)                           Cardiovascular:  Exercise tolerance: good (>4 METS),   (+) dysrhythmias (heart palpatations on occasion):, hyperlipidemia    (-) past MI      Rhythm: regular  Rate: normal                 ROS comment: EKG 8/2022 -  Sinus bradycardia 56 bpm     Neuro/Psych:   Negative Neuro/Psych ROS     (-) CVA           GI/Hepatic/Renal:        (-) GERD      ROS comment: Non-recurrent bilateral inguinal hernia without obstruction or gangrene. Endo/Other: Negative Endo/Other ROS                    Abdominal:             Vascular:   + PVD, aortic or cerebral, . ROS comment: Atherosclerosis of native artery of extremity with intermittent claudication     Popliteal artery aneurysm, bilateral  Popliteal artery occlusion, left     . Other Findings:             Anesthesia Plan      TIVA     ASA 3       Induction: intravenous. MIPS: Postoperative opioids intended and Prophylactic antiemetics administered. Anesthetic plan and risks discussed with patient and spouse. Plan discussed with CRNA.                     Afia Mendez MD   10/12/2022

## 2022-10-12 NOTE — ANESTHESIA POSTPROCEDURE EVALUATION
Department of Anesthesiology  Postprocedure Note    Patient: Be Max   MRN: [id number]  YOB: 1960  Date of evaluation: 10/12/2022      Procedure Summary     Date: 10/12/22 Room / Location: [facility] MAIN OR 12 / [facility] MAIN OR    Anesthesia Start: 2743 Anesthesia Stop: 1633    Procedure: RIGHT LEG ARTERIOGRAM POSSIBLE THROMBOLYSIS (Right: Groin) Diagnosis:       Right popliteal artery occlusion ([address]      (Right popliteal artery occlusion ([address] [I70.201])    Providers: Cristina Avendano MD Responsible Provider: Sophia Bell MD    Anesthesia Type: MAC ASA Status: 3          Anesthesia Type: MAC    Otf Phase I: Otf Score: 10    Otf Phase II:        Anesthesia Post Evaluation    Patient location during evaluation: ICU  Patient participation: complete - patient participated  Level of consciousness: awake and alert  Airway patency: patent  Nausea & Vomiting: no nausea and no vomiting  Complications: no  Cardiovascular status: hemodynamically stable  Respiratory status: acceptable, nonlabored ventilation and spontaneous ventilation  Hydration status: euvolemic  Comments: BP (!) 188/91   Pulse 57   Temp 98.6 °F (37 °C) (Temporal)   Resp (!) 32   Ht 5' 10\" (1.778 m)   Wt 194 lb (88 kg)   SpO2 100%   BMI 27.84 kg/m²     Multimodal analgesia pain management approach

## 2022-10-12 NOTE — PERIOP NOTE
TRANSFER - OUT REPORT:    Verbal report given to RN on Esaw Louder.  being transferred to CVICU for routine post-op       Report consisted of patient's Situation, Background, Assessment and   Recommendations(SBAR). Information from the following report(s) Nurse Handoff Report and Surgery Report was reviewed with the receiving nurse. Lines:   Peripheral IV 10/11/22 Left Antecubital (Active)   Site Assessment Clean, dry & intact 10/12/22 1152   Line Status Infusing 10/12/22 [address] Connections checked and tightened 10/12/22 1152   Phlebitis Assessment No symptoms 10/12/22 1152   Infiltration Assessment 0 10/12/22 1152   Alcohol Cap Used No 10/12/22 1152   Dressing Status Clean, dry & intact 10/12/22 1152   Dressing Type Transparent 10/12/22 1152       Peripheral IV 10/12/22 Left Forearm (Active)   Site Assessment Clean, dry & intact 10/12/22 1204   Line Status Infusing 10/12/22 [address] Connections checked and tightened 10/12/22 1204   Phlebitis Assessment No symptoms 10/12/22 1204   Infiltration Assessment 0 10/12/22 1204   Alcohol Cap Used No 10/12/22 1204   Dressing Status Clean, dry & intact 10/12/22 1204   Dressing Type Transparent 10/12/22 1204        Opportunity for questions and clarification was provided.       Patient transported with:  Monitor, O2 @ 3lpm, and Registered Zayianbaljeet

## 2022-10-12 NOTE — PLAN OF CARE
Problem: Discharge Planning  Goal: Discharge to home or other facility with appropriate resources  Outcome: Progressing  Flowsheets (Taken 10/11/2022 2115)  Discharge to home or other facility with appropriate resources:   Identify barriers to discharge with patient and caregiver   Arrange for needed discharge resources and transportation as appropriate   Refer to discharge planning if patient needs post-hospital services based on physician order or complex needs related to functional status, cognitive ability or social support system   Identify discharge learning needs (meds, wound care, etc)     Problem: Pain  Goal: Verbalizes/displays adequate comfort level or baseline comfort level  Outcome: Progressing     Problem: Safety - Adult  Goal: Free from fall injury  Outcome: Progressing     Problem: ABCDS Injury Assessment  Goal: Absence of physical injury  Outcome: Progressing

## 2022-10-12 NOTE — PROGRESS NOTES
Pt with dopplerable R pedal pulse during previous assessments and now unable to doppler. Dr. Hayden Melendez and oncoming RN aware.

## 2022-10-12 NOTE — PROGRESS NOTES
Patient status post introduction of left right lower extremity thrombolysis. 1 mg of tPA through the catheter and 500 units of heparin through the sheath. The heparin drip preop that he was on will be DC'd. Patient can resume his diet, will be n.p.o. after midnight for repeat thrombolysis in the morning. Called and gave wife update on the phone. Patient has no palpable pedal pulses. Thrombolysis protocol initiated with lab work.     [employer] Monge

## 2022-10-12 NOTE — H&P
[address] FAX: [address] [facility].  1960    No chief complaint on file. HPI   Mr. Juana Morales. is a 63y.o. year old male who presents with 1 day history of right leg pain. He is status post right popliteal artery stent placement for popliteal artery aneurysm disease. Last seen in my office last Friday with a palpable pedal pulses.   Current Facility-Administered Medications   Medication Dose Route Frequency Provider Last Rate Last Admin    losartan (COZAAR) tablet 25 mg  25 mg Oral Daily Roselia Noyola MD   25 mg at 10/11/22 2149    acetaminophen (TYLENOL) tablet 500 mg  500 mg Oral Q4H PRN Roselia Noyola MD        aspirin EC tablet 81 mg  81 mg Oral Daily Roselia Noyola MD   81 mg at 10/11/22 2149    heparin (porcine) injection 4,000 Units  4,000 Units IntraVENous PRN Roselia Noyola MD   4,000 Units at 10/12/22 0602    heparin (porcine) injection 2,000 Units  2,000 Units IntraVENous PRN Roselia Noyola MD        heparin 25,000 units in dextrose 5% 250 mL (premix) infusion  5-30 Units/kg/hr IntraVENous Continuous Roselia Noyola MD 13.2 mL/hr at 10/12/22 2218 15 Units/kg/hr at 10/12/22 0608    oxyCODONE-acetaminophen (PERCOCET) 7.5-325 MG per tablet 1 tablet  1 tablet Oral Q6H PRN Roselia Noyola MD        morphine injection 2 mg  2 mg IntraVENous Q3H PRN Roselia Noyola MD   2 mg at 10/12/22 0430     No Known Allergies  Past Medical History:   Diagnosis Date    Diverticulosis of colon (without mention of hemorrhage) 2015    no symptoms    Dysrhythmia, cardiac     heart palpatations on occasion    Hypercholesterolemia     managed with meds    GABE on CPAP     central and obstructive, CPAP 9 cm H2O does not wear cpap    Personal history of colonic polyps 2015    adenoma, TVA    PVD (peripheral vascular disease) with claudication ([facility] [address]     Snoring     Tobacco abuse      Family History   Problem Relation Age of Onset    Diabetes Brother     Stroke Maternal Grandmother     Diabetes Brother     Heart Disease Brother     Hypertension Father     Heart Disease Mother     Hypertension Sister     Heart Disease Father     Diabetes Father     Diabetes Sister      Past Surgical History:   Procedure Laterality Date    COLONOSCOPY  last 2/16/15    Uma--three trans TA, one spl flx TVA, rectal TVA--3 year recall    KNEE ARTHROSCOPY Left 1996    VASCULAR SURGERY Left 6-18-15    profundoplasty of profunda femoris artery    VASCULAR SURGERY Left 8/29/2022    RIGHT LOWER EXTREMITY ARTERIOGRAM WITH BALLOON ANGIOPLASTY AND STENT OF POPLITEAL ARTERY performed by Ann Marie Rodrigez MD at [facility] MAIN OR     Social History     Tobacco Use    Smoking status: Every Day     Packs/day: 1.00     Years: 40.00     Pack years: 40.00     Types: Cigarettes    Smokeless tobacco: Never   Substance Use Topics    Alcohol use: Not Currently        Review of Systems  Constitutional: Negative for fever and chills. HENT: Negative for congestion and sore throat. Skin: Negative for rash and itching. Eyes: Negative for blurred vision and double vision. Respiratory: Negative for cough and shortness of breath. Cardiovascular: Negative for chest pain, palpitations, claudication and leg swelling. Gastrointestinal: Negative for nausea, vomiting and abdominal pain. Genitourinary: Negative for dysuria, hematuria and flank pain. Musculoskeletal: Negative for joint pain and falls. Neurological: Negative for dizziness, sensory change, focal weakness, loss of consciousness and headaches. PHYSICAL EXAM   [unfilled]     Constitutional: he appears well-developed. No distress. HENT: Hearing intact. Head: Atraumatic. Eyes: Pupils are equal, round, and reactive to light. Neck: Normal range of motion. Cardiovascular: Regular rhythm. Pulmonary/Chest: Effort normal and breath sounds normal. No respiratory distress. Abdominal: Soft. Bowel sounds are normal. he exhibits no distension. There is no tenderness. There is no guarding. No hernia. Musculoskeletal: Normal range of motion. Neurological: He is alert. CN II- XII grossly intact  Skin: Warm and dry. Vascular: Palpable femoral pulses nonpalpable pedal pulses Doppler anterior tibial signal      Imaging Results  CTA showed occlusion of the popliteal artery stent with reconstitutes below-knee popliteal artery    ASSESSMENT AND PLAN   Mr. Vanessa Gutierrez. is a 63y.o. year old male with occluded popliteal artery stent we will plan for right leg arteriogram with possible thrombolysis. Patient understands risk and benefits of procedure. Keep heparin drip on we will shut off on the wait to the OR. .    Elements of this note have been dictated using speech recognition software. As a result, errors of speech recognition may have occurred. 50 minutes of time was spent on this consult with greater than 50% of time spent face-to-face with the patient discussing the disease process, education and treatment options.

## 2022-10-13 ENCOUNTER — ANESTHESIA (OUTPATIENT)
Dept: SURGERY | Age: 62
DRG: 315 | End: 2022-10-13
Payer: COMMERCIAL

## 2022-10-13 ENCOUNTER — APPOINTMENT (OUTPATIENT)
Dept: INTERVENTIONAL RADIOLOGY/VASCULAR | Age: 62
DRG: 315 | End: 2022-10-13
Payer: COMMERCIAL

## 2022-10-13 VITALS
SYSTOLIC BLOOD PRESSURE: 125 MMHG | OXYGEN SATURATION: 97 % | DIASTOLIC BLOOD PRESSURE: 79 MMHG | HEIGHT: 70 IN | RESPIRATION RATE: 21 BRPM | BODY MASS INDEX: 27.77 KG/M2 | HEART RATE: 75 BPM | TEMPERATURE: 98 F | WEIGHT: 194 LBS

## 2022-10-13 LAB
ALBUMIN SERPL-MCNC: 3.5 G/DL (ref 3.2–4.6)
ALBUMIN/GLOB SERPL: 0.9 {RATIO} (ref 0.4–1.6)
ALP SERPL-CCNC: 77 U/L (ref 50–136)
ALT SERPL-CCNC: 15 U/L (ref 12–65)
ANION GAP SERPL CALC-SCNC: 5 MMOL/L (ref 2–11)
ANION GAP SERPL CALC-SCNC: 9 MMOL/L (ref 2–11)
APTT PPP: 38.3 SEC (ref 24.1–35.1)
APTT PPP: 40.7 SEC (ref 24.1–35.1)
AST SERPL-CCNC: 10 U/L (ref 15–37)
BILIRUB SERPL-MCNC: 0.7 MG/DL (ref 0.2–1.1)
BUN SERPL-MCNC: 11 MG/DL (ref 8–23)
BUN SERPL-MCNC: 8 MG/DL (ref 8–23)
CALCIUM SERPL-MCNC: 8.6 MG/DL (ref 8.3–10.4)
CALCIUM SERPL-MCNC: 9.3 MG/DL (ref 8.3–10.4)
CHLORIDE SERPL-SCNC: 104 MMOL/L (ref 101–110)
CHLORIDE SERPL-SCNC: 106 MMOL/L (ref 101–110)
CO2 SERPL-SCNC: 23 MMOL/L (ref 21–32)
CO2 SERPL-SCNC: 26 MMOL/L (ref 21–32)
CREAT SERPL-MCNC: 0.5 MG/DL (ref 0.8–1.5)
CREAT SERPL-MCNC: 0.7 MG/DL (ref 0.8–1.5)
ERYTHROCYTE [DISTWIDTH] IN BLOOD BY AUTOMATED COUNT: 11.8 % (ref 11.9–14.6)
ERYTHROCYTE [DISTWIDTH] IN BLOOD BY AUTOMATED COUNT: 11.9 % (ref 11.9–14.6)
FIBRINOGEN PPP-MCNC: 117 MG/DL (ref 190–501)
FIBRINOGEN PPP-MCNC: 124 MG/DL (ref 190–501)
GLOBULIN SER CALC-MCNC: 4.1 G/DL (ref 2.8–4.5)
GLUCOSE SERPL-MCNC: 106 MG/DL (ref 65–100)
GLUCOSE SERPL-MCNC: 133 MG/DL (ref 65–100)
HCT VFR BLD AUTO: 41.4 % (ref 41.1–50.3)
HCT VFR BLD AUTO: 42.7 % (ref 41.1–50.3)
HGB BLD-MCNC: 13.7 G/DL (ref 13.6–17.2)
HGB BLD-MCNC: 14.5 G/DL (ref 13.6–17.2)
MCH RBC QN AUTO: 29.4 PG (ref 26.1–32.9)
MCH RBC QN AUTO: 29.8 PG (ref 26.1–32.9)
MCHC RBC AUTO-ENTMCNC: 33.1 G/DL (ref 31.4–35)
MCHC RBC AUTO-ENTMCNC: 34 G/DL (ref 31.4–35)
MCV RBC AUTO: 87.9 FL (ref 82–102)
MCV RBC AUTO: 88.8 FL (ref 82–102)
NRBC # BLD: 0 K/UL (ref 0–0.2)
NRBC # BLD: 0 K/UL (ref 0–0.2)
PLATELET # BLD AUTO: 163 K/UL (ref 150–450)
PLATELET # BLD AUTO: 164 K/UL (ref 150–450)
PMV BLD AUTO: 10.1 FL (ref 9.4–12.3)
PMV BLD AUTO: 9.8 FL (ref 9.4–12.3)
POTASSIUM SERPL-SCNC: 3.3 MMOL/L (ref 3.5–5.1)
POTASSIUM SERPL-SCNC: 3.6 MMOL/L (ref 3.5–5.1)
PROT SERPL-MCNC: 7.6 G/DL (ref 6.3–8.2)
RBC # BLD AUTO: 4.66 M/UL (ref 4.23–5.6)
RBC # BLD AUTO: 4.86 M/UL (ref 4.23–5.6)
SODIUM SERPL-SCNC: 136 MMOL/L (ref 133–143)
SODIUM SERPL-SCNC: 137 MMOL/L (ref 133–143)
UFH PPP CHRO-ACNC: <0.1 IU/ML (ref 0.3–0.7)
WBC # BLD AUTO: 11.5 K/UL (ref 4.3–11.1)
WBC # BLD AUTO: 7.5 K/UL (ref 4.3–11.1)

## 2022-10-13 PROCEDURE — 85384 FIBRINOGEN ACTIVITY: CPT

## 2022-10-13 PROCEDURE — B41F1ZZ FLUOROSCOPY OF RIGHT LOWER EXTREMITY ARTERIES USING LOW OSMOLAR CONTRAST: ICD-10-PCS | Performed by: SURGERY

## 2022-10-13 PROCEDURE — 85027 COMPLETE CBC AUTOMATED: CPT

## 2022-10-13 PROCEDURE — 3600000017 HC SURGERY HYBRID ADDL 15MIN: Performed by: SURGERY

## 2022-10-13 PROCEDURE — 2500000003 HC RX 250 WO HCPCS: Performed by: NURSE ANESTHETIST, CERTIFIED REGISTERED

## 2022-10-13 PROCEDURE — 2500000003 HC RX 250 WO HCPCS: Performed by: SURGERY

## 2022-10-13 PROCEDURE — 6360000002 HC RX W HCPCS: Performed by: SURGERY

## 2022-10-13 PROCEDURE — 6370000000 HC RX 637 (ALT 250 FOR IP): Performed by: SURGERY

## 2022-10-13 PROCEDURE — 80053 COMPREHEN METABOLIC PANEL: CPT

## 2022-10-13 PROCEDURE — 6360000004 HC RX CONTRAST MEDICATION: Performed by: SURGERY

## 2022-10-13 PROCEDURE — 6360000002 HC RX W HCPCS: Performed by: NURSE ANESTHETIST, CERTIFIED REGISTERED

## 2022-10-13 PROCEDURE — 3600000007 HC SURGERY HYBRID BASE: Performed by: SURGERY

## 2022-10-13 PROCEDURE — 3700000000 HC ANESTHESIA ATTENDED CARE: Performed by: SURGERY

## 2022-10-13 PROCEDURE — 99285 EMERGENCY DEPT VISIT HI MDM: CPT

## 2022-10-13 PROCEDURE — 37213 THROMBLYTIC ART/VEN THERAPY: CPT | Performed by: SURGERY

## 2022-10-13 PROCEDURE — 85730 THROMBOPLASTIN TIME PARTIAL: CPT

## 2022-10-13 PROCEDURE — 3700000001 HC ADD 15 MINUTES (ANESTHESIA): Performed by: SURGERY

## 2022-10-13 PROCEDURE — 2580000003 HC RX 258: Performed by: SURGERY

## 2022-10-13 PROCEDURE — C1769 GUIDE WIRE: HCPCS

## 2022-10-13 PROCEDURE — 96365 THER/PROPH/DIAG IV INF INIT: CPT

## 2022-10-13 RX ORDER — CEFAZOLIN SODIUM 1 G/3ML
INJECTION, POWDER, FOR SOLUTION INTRAMUSCULAR; INTRAVENOUS PRN
Status: DISCONTINUED | OUTPATIENT
Start: 2022-10-13 | End: 2022-10-13 | Stop reason: SDUPTHER

## 2022-10-13 RX ORDER — LIDOCAINE HCL/PF 100 MG/5ML
SYRINGE (ML) INJECTION PRN
Status: DISCONTINUED | OUTPATIENT
Start: 2022-10-13 | End: 2022-10-13 | Stop reason: SDUPTHER

## 2022-10-13 RX ORDER — POTASSIUM CHLORIDE 20 MEQ/1
40 TABLET, EXTENDED RELEASE ORAL ONCE
Status: COMPLETED | OUTPATIENT
Start: 2022-10-13 | End: 2022-10-13

## 2022-10-13 RX ORDER — HEPARIN SODIUM 200 [USP'U]/100ML
INJECTION, SOLUTION INTRAVENOUS CONTINUOUS PRN
Status: COMPLETED | OUTPATIENT
Start: 2022-10-13 | End: 2022-10-13

## 2022-10-13 RX ORDER — PROPOFOL 10 MG/ML
INJECTION, EMULSION INTRAVENOUS PRN
Status: DISCONTINUED | OUTPATIENT
Start: 2022-10-13 | End: 2022-10-13 | Stop reason: SDUPTHER

## 2022-10-13 RX ORDER — FENTANYL CITRATE 50 UG/ML
INJECTION, SOLUTION INTRAMUSCULAR; INTRAVENOUS PRN
Status: DISCONTINUED | OUTPATIENT
Start: 2022-10-13 | End: 2022-10-13 | Stop reason: SDUPTHER

## 2022-10-13 RX ORDER — MIDAZOLAM HYDROCHLORIDE 1 MG/ML
INJECTION INTRAMUSCULAR; INTRAVENOUS PRN
Status: DISCONTINUED | OUTPATIENT
Start: 2022-10-13 | End: 2022-10-13 | Stop reason: SDUPTHER

## 2022-10-13 RX ADMIN — ALTEPLASE 1 MG/HR: 2.2 INJECTION, POWDER, LYOPHILIZED, FOR SOLUTION INTRAVENOUS at 04:40

## 2022-10-13 RX ADMIN — SODIUM CHLORIDE 2.5 MG/HR: 9 INJECTION, SOLUTION INTRAVENOUS at 02:36

## 2022-10-13 RX ADMIN — PROPOFOL 180 MCG/KG/MIN: 10 INJECTION, EMULSION INTRAVENOUS at 07:17

## 2022-10-13 RX ADMIN — FENTANYL CITRATE 50 MCG: 50 INJECTION, SOLUTION INTRAMUSCULAR; INTRAVENOUS at 08:10

## 2022-10-13 RX ADMIN — CEFAZOLIN SODIUM 2 G: 1 INJECTION, POWDER, FOR SOLUTION INTRAMUSCULAR; INTRAVENOUS at 07:25

## 2022-10-13 RX ADMIN — POTASSIUM CHLORIDE 40 MEQ: 20 TABLET, EXTENDED RELEASE ORAL at 09:05

## 2022-10-13 RX ADMIN — PROPOFOL 50 MG: 10 INJECTION, EMULSION INTRAVENOUS at 07:40

## 2022-10-13 RX ADMIN — Medication 60 MG: at 07:13

## 2022-10-13 RX ADMIN — FENTANYL CITRATE 50 MCG: 50 INJECTION, SOLUTION INTRAMUSCULAR; INTRAVENOUS at 08:02

## 2022-10-13 RX ADMIN — LOSARTAN POTASSIUM 25 MG: 25 TABLET, FILM COATED ORAL at 08:47

## 2022-10-13 RX ADMIN — Medication 2000 MG: at 06:39

## 2022-10-13 RX ADMIN — MIDAZOLAM 2 MG: 1 INJECTION INTRAMUSCULAR; INTRAVENOUS at 07:13

## 2022-10-13 RX ADMIN — ASPIRIN 81 MG: 81 TABLET ORAL at 08:47

## 2022-10-13 RX ADMIN — SODIUM CHLORIDE, PRESERVATIVE FREE 10 ML: 5 INJECTION INTRAVENOUS at 08:48

## 2022-10-13 RX ADMIN — PROPOFOL 60 MG: 10 INJECTION, EMULSION INTRAVENOUS at 07:16

## 2022-10-13 ASSESSMENT — PAIN SCALES - GENERAL
PAINLEVEL_OUTOF10: 2
PAINLEVEL_OUTOF10: 3
PAINLEVEL_OUTOF10: 10

## 2022-10-13 ASSESSMENT — PAIN - FUNCTIONAL ASSESSMENT: PAIN_FUNCTIONAL_ASSESSMENT: 0-10

## 2022-10-13 ASSESSMENT — PAIN DESCRIPTION - DESCRIPTORS: DESCRIPTORS: ACHING;PRESSURE;SHARP;SHOOTING

## 2022-10-13 ASSESSMENT — PAIN DESCRIPTION - LOCATION: LOCATION: BUTTOCKS;LEG;KNEE

## 2022-10-13 ASSESSMENT — PAIN DESCRIPTION - ORIENTATION: ORIENTATION: RIGHT;POSTERIOR

## 2022-10-13 NOTE — OP NOTE
[address] [facility]  OPERATIVE REPORT    Name:  Laly Casillas  MR#:  [id number]  :  1960  ACCOUNT #:  [de-identified]  DATE OF SERVICE:  10/13/2022    CLINICAL SERVICE:  Vascular Surgery. PREOPERATIVE DIAGNOSIS:  Status post thrombolysis of right lower extremity. POSTOPERATIVE DIAGNOSIS:  Status post thrombolysis of right lower extremity. PROCEDURES PERFORMED:  1. Recheck of thrombolysis with right lower extremity arteriogram.  2.  Diagnostic arteriogram.    SURGEON:  Conchita Mercado. Kyle Montes MD    ASSISTANT:      ANESTHESIA:  Sedation. COMPLICATIONS:  .    SPECIMENS REMOVED:      IMPLANTS:      ESTIMATED BLOOD LOSS:      OPERATIVE FINDINGS:  1. SFA and popliteal artery stents were patent. 2.  The below-knee popliteal artery was patent with peroneal being dominant runoff to the foot supplying collateral flow to the dorsalis pedis. PROCEDURE IN DETAIL:  After getting informed consent, the patient was brought to the operating room. Anesthesia was then induced. Preoperative antibiotics were given prior to skin incision. The patient's right leg was prepped and draped in normal sterile fashion. We started off with an injection to the sheath. A distal multihole catheter was placed below-knee pop so that the peroneal artery, which was the dominant runoff, was patent all the way down to the foot supplying collateral flow to the dorsalis pedis. The posterior tibial and the anterior tibial were occluded. We then injected 2 mg of tPA. I exchanged over a wire and did a sheath shot from the catheter and would show the proximal SFA and profunda were all patent. We then removed the sheath and closed with a Mynx device. The patient was extubated and returned to the PACU in stable condition.       MD BRE [email or web address]  D:  10/13/2022 8:06  T:  10/13/2022 14:24  JOB #:  [id number]

## 2022-10-13 NOTE — PROGRESS NOTES
We will start patient on Eliquis 2.5 twice daily starting tomorrow, with indication being recent thrombosis of right popliteal artery stent with ischemia.     Dilma Coleman

## 2022-10-13 NOTE — DISCHARGE INSTRUCTIONS
No driving or lifting greater than 5 pounds until seen by Dr. Vandana Martin for follow-up appointment. May shower on 10/15/22 - sponge bath until then daily. Keep incisions clean and dry. Do NOT sit down in water for a tub bath, use a whirlpool, hot tub or swimming pool for 4 weeks, or until the incision is completely healed. Gently wash the incisions with soap and water when you are in the shower. Pat dry with a clean towel. Do NOT scrub the incision. Make sure the incisions are kept clean and dry. Do NOT apply lotions, ointments or creams to the incision. It is normal for there to be some redness, bruising, or swelling for the first week after the procedure. Call Your Surgeon for Any of These Symptoms #[phone]:  - Leg swelling that does not improve with frequent elevation above the level of the heart. (Reminder: Lower leg swelling is common after this procedure. If this happens elevate your legs when you are reclining above the level of your heart. The swelling may last for several weeks). - There is bleeding at the incision that does not stop when pressure is applied.  - A painful swelling occurs over a catheter insertion site. - The incision has increasing pain, redness, swelling or draining pus.  - Leaking of fluid from the incision.  - Your leg becomes cold, painful or numb.   - You have chest pain or shortness of breath.  - You have chills or a fever over 101 degrees F.

## 2022-10-13 NOTE — PROGRESS NOTES
Discharge instructions and follow up appt information given to and reviewed with patient. Opportunity for questions provided, pt voiced understanding. All pt belongings taken with pt from room. Pt stable at time of d/c. Pt transported to discharge area via wheelchair, family member driving pt home.

## 2022-10-13 NOTE — CARE COORDINATION
Pt with discharge orders this day. Chart screened by  for discharge planning. No CM needs identified at time of discharge. Milestones met.         10/11/22 8294   Service Assessment   Support Systems Spouse/Significant Other;Family Members;Friends/Neighbors   Discharge Planning   Type of Residence House   Living Arrangements Spouse/Significant Other   Current Services Prior To Admission None   Potential Assistance Needed N/A   Type of Home Care Services None   Patient expects to be discharged to: House   Condition of Participation: Discharge Planning   The Plan for Transition of Care is related to the following treatment goals: Home with spouse

## 2022-10-13 NOTE — PROGRESS NOTES
Initial visit was made, emotional support and a spiritual presence were provided.      Ayleen Mckeon, [address] [facility], [facility]

## 2022-10-13 NOTE — PROGRESS NOTES
Patient status post thrombolysis recheck. Right popliteal stent is patent with below-knee popliteal artery patent with peroneal being dominant runoff to the foot is a plan for dorsalis pedis pedal.  Patient will be transferred back to the ICU we will lay flat till 10:00. Patient can resume diet will start p.o. anticoagulation tomorrow with plan for discharge today.     Amparo Hollis

## 2022-10-13 NOTE — BRIEF OP NOTE
[place] [address]  [phone] FAX: [phone]    Brief Op Note Template Note    Pre-Op Diagnosis: Right popliteal artery occlusion    Post-Op Diagnosis:  * No post-op diagnosis entered *    Procedures: Procedure(s):  ARTERIOGRAM RIGHT LEG LYSIS RECHECK    Surgeon: Apollo Gonzalez MD    Assistants: Surgeon(s):  Mary Flores MD      Anesthesia:  General     Findings: Right popliteal artery stent and distal runoff patent with peroneal being dominant runoff    Tourniquet Time:  * No tourniquets in log *    Estimated Blood Loss:               Specimens:            Implants:  * No implants in log *    Complications: None               Signed: Apollo Gonzalez MD      Elements of this note have been dictated using speech recognition software. As a result, errors of speech recognition may have occurred.

## 2022-10-13 NOTE — OP NOTE
[facility]  OPERATIVE REPORT    Name:  Angela Dumas  MR#:  [id number]  :  1960  ACCOUNT #:  [de-identified]  DATE OF SERVICE:  10/12/2022    CLINICAL SERVICE:  Vascular Surgery. PREOPERATIVE DIAGNOSIS:  Acute-on-chronic right lower extremity ischemia. POSTOPERATIVE DIAGNOSIS:  Acute-on-chronic right lower extremity ischemia. PROCEDURE PERFORMED:  1. Ultrasound-guided access of left common femoral artery with placement of catheter in aorta. 2.  Right lower extremity arteriogram with introduction of thrombolysis with a 6-Qatari x 200 cm multihole catheter. SURGEON:  Nat Alonzo MD    ASSISTANT:      ANESTHESIA:  Sedation. COMPLICATIONS:      SPECIMENS REMOVED:      IMPLANTS:     ESTIMATED BLOOD LOSS:      FINDINGS:  1. There was no evidence of any aortoiliac disease. 2.   Bilateral hypogastric arteries were patent. 3.   Right lower extremity showed a patent common femoral artery, profunda, and proximal SFA with a distal SFA occlusion, in-stent occlusion with below-knee popliteal artery occluded with distal popliteal and anterior tibial being diseased but patent. INDICATIONS FOR THE PROCEDURE:  This is a 63-year-old male with status post repair of a symptomatic popliteal artery aneurysm with Viabahn stent, who presented with one day history of right leg pain. Ultrasound occluded. He still had motor and sensory intact, so we elected to proceed with arteriogram.    PROCEDURE:  After getting informed consent, the patient was brought to the operating room. Anesthesia was then induced. Preoperative antibiotics were given prior to skin incision. The patient's leg was then prepped and draped in normal sterile fashion. Ultrasound used to identify the left common femoral artery. We got access with a 5-Qatari sheath, upsized to a 6French sheath over a 0.035 starter wire. Please see above for anatomical findings.   We got our catheter up to the contralateral common femoral artery and did a digital subtraction, which showed the distal SFA and popliteal artery occlusion. With a Glidewire and Quick-Cross catheter, I was able to get through into the occluded stent to below knee pop. I confirmed I was intraluminal.  I then exchanged over an Amplatz to a six Amplatz sheath and then brought into the field 6-Frenchcatheter was placed at the knee joint above the pop. Sheathogram showed we were intraluminal but still there was thrombus. I then initially did a thrombolysis with 1 mg of t-PA through the sheath and 500 through the catheter   The patient was taken to the ICU for further care.       MD BRE [email or web address]  D:  10/12/2022 15:44  T:  10/12/2022 20:18  JOB #:  [id number]

## 2022-10-13 NOTE — ANESTHESIA POSTPROCEDURE EVALUATION
Department of Anesthesiology  Postprocedure Note    Patient: Vanessa Henderson MRN: [id number]  YOB: 1960  Date of evaluation: 10/13/2022      Procedure Summary     Date: 10/13/22 Room / Location: [facility] MAIN OR 12 / [facility] MAIN OR    Anesthesia Start: 5236 Anesthesia Stop: 0304    Procedure: ARTERIOGRAM RIGHT LEG LYSIS RECHECK (Right: Leg Lower) Diagnosis:       Right popliteal artery occlusion (HCC)      (Right popliteal artery occlusion)    Providers: Mayank Florez MD Responsible Provider: Liza Bradley MD    Anesthesia Type: TIVA ASA Status: 3          Anesthesia Type: No value filed. Otf Phase I: Oft Score: 10    Otf Phase II:        Anesthesia Post Evaluation    Patient location during evaluation: ICU  Patient participation: complete - patient participated  Level of consciousness: awake and alert  Airway patency: patent  Nausea: well controlled. Complications: no  Cardiovascular status: acceptable.   Respiratory status: acceptable  Hydration status: stable

## 2022-10-13 NOTE — PROGRESS NOTES
[address] FAX: [phone]         VASCULAR SURGERY FLOOR PROGRESS NOTE    Admit Date: 10/11/2022  POD: 1 Day Post-Op    Procedure:  Procedure(s):  RIGHT LEG ARTERIOGRAM POSSIBLE THROMBOLYSIS    Subjective:     Patient has no new complaints. Objective:     Vitals:  Blood pressure 121/79, pulse 76, temperature 98.9 °F (37.2 °C), temperature source Oral, resp. rate (!) 34, height 5' 10\" (1.778 m), weight 194 lb (88 kg), SpO2 97 %. Temp (24hrs), Av.1 °F (36.7 °C), Min:97.6 °F (36.4 °C), Max:98.9 °F (37.2 °C)      Intake / Output:    Intake/Output Summary (Last 24 hours) at 10/13/2022 06  Last data filed at 10/13/2022 0612  Gross per 24 hour   Intake 1889.15 ml   Output 1785 ml   Net 104.15 ml       Physical Exam:    Constitutional: he appears well-developed. No distress. HENT:   Head: Atraumatic. Eyes: Pupils are equal, round, and reactive to light. Neck: Normal range of motion. Cardiovascular: Regular rhythm. Pulmonary/Chest: Effort normal and breath sounds normal. No respiratory distress. Abdominal: Soft. Bowel sounds are normal. he exhibits no distension. There is no tenderness. There is no guarding. No hernia. Musculoskeletal: Normal range of motion. Neurological: He is alert. CN II- XII grossly intact  Vascular: foot warm, mototrsensnsory intact    Labs:   Recent Labs     10/11/22  1422 10/11/22  2207 10/12/22  1732 10/12/22  2301   HGB 14.7   < > 13.9 13.1*   WBC 9.4   < > 6.8 7.6   K 3.7  --  3.8  --     < > = values in this interval not displayed.        Data Review     Assessment:     Patient Active Problem List    Diagnosis Date Noted    Right popliteal artery occlusion ([facility] [address] 10/11/2022    Non-recurrent bilateral inguinal hernia without obstruction or gangrene 2021    Peripheral artery disease ([facility] [address] 2015    Popliteal artery aneurysm, bilateral ([facility] [address] 2015    Hypercholesterolemia     Atherosclerosis of native artery of extremity with intermittent claudication ([facility] [address] 07/29/2014    Popliteal artery occlusion, left (HCC) 07/29/2014    Snoring     Sleep apnea     Dysrhythmia, cardiac        Plan/Recommendations/Medical Decision Making:     Plan for right leg angiogram, possible lysis  - foot perfused, warm motor sensory intact    Elements of this note have been dictated using speech recognition software. As a result, errors of speech recognition may have occurred.

## 2022-10-13 NOTE — PLAN OF CARE
Problem: Discharge Planning  Goal: Discharge to home or other facility with appropriate resources  Outcome: Progressing  Flowsheets  Taken 10/12/2022 1945 by Noel Anton RN  Discharge to home or other facility with appropriate resources: Identify barriers to discharge with patient and caregiver  Taken 10/12/2022 0700 by Kumar Smith RN  Discharge to home or other facility with appropriate resources:   Identify barriers to discharge with patient and caregiver   Arrange for needed discharge resources and transportation as appropriate   Identify discharge learning needs (meds, wound care, etc)   Refer to discharge planning if patient needs post-hospital services based on physician order or complex needs related to functional status, cognitive ability or social support system     Problem: Pain  Goal: Verbalizes/displays adequate comfort level or baseline comfort level  Outcome: Progressing  Flowsheets (Taken 10/12/2022 1945)  Verbalizes/displays adequate comfort level or baseline comfort level:   Encourage patient to monitor pain and request assistance   Assess pain using appropriate pain scale     Problem: Safety - Adult  Goal: Free from fall injury  Outcome: Progressing  Flowsheets (Taken 10/12/2022 1945)  Free From Fall Injury: Instruct family/caregiver on patient safety     Problem: ABCDS Injury Assessment  Goal: Absence of physical injury  Outcome: Progressing  Flowsheets (Taken 10/12/2022 1945)  Absence of Physical Injury: Implement safety measures based on patient assessment     Problem: Neurosensory - Adult  Goal: Achieves stable or improved neurological status  Outcome: Progressing  Flowsheets (Taken 10/12/2022 1945)  Achieves stable or improved neurological status: Assess for and report changes in neurological status  Goal: Absence of seizures  Outcome: Progressing  Flowsheets (Taken 10/12/2022 1945)  Absence of seizures: Monitor for seizure activity.   If seizure occurs, document type and location of movements and any associated apnea     Problem: Respiratory - Adult  Goal: Achieves optimal ventilation and oxygenation  Outcome: Progressing  Flowsheets (Taken 10/12/2022 1945)  Achieves optimal ventilation and oxygenation:   Assess for changes in respiratory status   Assess for changes in mentation and behavior     Problem: Cardiovascular - Adult  Goal: Maintains optimal cardiac output and hemodynamic stability  Outcome: Progressing  Flowsheets (Taken 10/12/2022 1945)  Maintains optimal cardiac output and hemodynamic stability:   Monitor blood pressure and heart rate   Monitor urine output and notify Licensed Independent Practitioner for values outside of normal range  Goal: Absence of cardiac dysrhythmias or at baseline  Outcome: Progressing  Flowsheets (Taken 10/12/2022 1945)  Absence of cardiac dysrhythmias or at baseline: Monitor cardiac rate and rhythm     Problem: Skin/Tissue Integrity - Adult  Goal: Skin integrity remains intact  Outcome: Progressing  Flowsheets (Taken 10/12/2022 1945)  Skin Integrity Remains Intact:   Monitor for areas of redness and/or skin breakdown   Assess vascular access sites hourly  Goal: Incisions, wounds, or drain sites healing without S/S of infection  Outcome: Progressing  Flowsheets (Taken 10/12/2022 1945)  Incisions, Wounds, or Drain Sites Healing Without Sign and Symptoms of Infection:   Implement wound care per orders   Initiate isolation precautions as appropriate  Goal: Oral mucous membranes remain intact  Outcome: Progressing  Flowsheets (Taken 10/12/2022 1945)  Oral Mucous Membranes Remain Intact: Assess oral mucosa and hygiene practices     Problem: Musculoskeletal - Adult  Goal: Return mobility to safest level of function  Outcome: Progressing  Flowsheets (Taken 10/12/2022 1945)  Return Mobility to Safest Level of Function:   Assess patient stability and activity tolerance for standing, transferring and ambulating with or without assistive devices   Assist with transfers and ambulation using safe patient handling equipment as needed  Goal: Maintain proper alignment of affected body part  Outcome: Progressing  Flowsheets (Taken 10/12/2022 1945)  Maintain proper alignment of affected body part: Support and protect limb and body alignment per provider's orders     Problem: Gastrointestinal - Adult  Goal: Minimal or absence of nausea and vomiting  Outcome: Progressing  Flowsheets (Taken 10/12/2022 1945)  Minimal or absence of nausea and vomiting: Administer IV fluids as ordered to ensure adequate hydration     Problem: Genitourinary - Adult  Goal: Absence of urinary retention  Outcome: Progressing  Flowsheets (Taken 10/12/2022 1945)  Absence of urinary retention: Assess patients ability to void and empty bladder  Goal: Urinary catheter remains patent  Outcome: Progressing  Flowsheets (Taken 10/12/2022 1945)  Urinary catheter remains patent: Assess patency of urinary catheter     Problem: Infection - Adult  Goal: Absence of infection at discharge  Outcome: Progressing  Flowsheets (Taken 10/12/2022 1945)  Absence of infection at discharge:   Assess and monitor for signs and symptoms of infection   Monitor lab/diagnostic results  Goal: Absence of infection during hospitalization  Outcome: Progressing  Flowsheets (Taken 10/12/2022 1945)  Absence of infection during hospitalization:   Assess and monitor for signs and symptoms of infection   Monitor lab/diagnostic results  Goal: Absence of fever/infection during anticipated neutropenic period  Outcome: Progressing  Flowsheets (Taken 10/12/2022 1945)  Absence of fever/infection during anticipated neutropenic period: Monitor white blood cell count     Problem: Metabolic/Fluid and Electrolytes - Adult  Goal: Electrolytes maintained within normal limits  Outcome: Progressing  Flowsheets (Taken 10/12/2022 1945)  Electrolytes maintained within normal limits: Monitor labs and assess patient for signs and symptoms of electrolyte imbalances  Goal: Hemodynamic stability and optimal renal function maintained  Outcome: Progressing  Flowsheets (Taken 10/12/2022 1945)  Hemodynamic stability and optimal renal function maintained: Monitor labs and assess for signs and symptoms of volume excess or deficit  Goal: Glucose maintained within prescribed range  Outcome: Progressing  Flowsheets (Taken 10/12/2022 1945)  Glucose maintained within prescribed range: Monitor blood glucose as ordered     Problem: Hematologic - Adult  Goal: Maintains hematologic stability  Outcome: Progressing  Flowsheets (Taken 10/12/2022 1945)  Maintains hematologic stability: Assess for signs and symptoms of bleeding or hemorrhage     Problem: Skin/Tissue Integrity  Goal: Absence of new skin breakdown  Description: 1. Monitor for areas of redness and/or skin breakdown  2. Assess vascular access sites hourly  3. Every 4-6 hours minimum:  Change oxygen saturation probe site  4. Every 4-6 hours:  If on nasal continuous positive airway pressure, respiratory therapy assess nares and determine need for appliance change or resting period.   Outcome: Progressing

## 2022-10-13 NOTE — PERIOP NOTE
TRANSFER - OUT REPORT:    Verbal report given to [facility] 7715 on Neema Medico.  being transferred to CVICU for routine post-op       Report consisted of patient's Situation, Background, Assessment and   Recommendations(SBAR). Information from the following report(s) Nurse Handoff Report and Surgery Report was reviewed with the receiving nurse. Lines:   Peripheral IV 10/11/22 Left Antecubital (Active)   Site Assessment Clean, dry & intact 10/13/22 0300   Line Status Flushed;Capped 10/13/22 0300   Line Care Connections checked and tightened 10/13/22 0300   Phlebitis Assessment No symptoms 10/13/22 0300   Infiltration Assessment 0 10/13/22 0300   Alcohol Cap Used Yes 10/13/22 0300   Dressing Status Clean, dry & intact 10/13/22 0300   Dressing Type Transparent 10/13/22 0300       Peripheral IV 10/12/22 Left Forearm (Active)   Site Assessment Clean, dry & intact 10/13/22 0300   Line Status Flushed;Capped 10/13/22 0300   Line Care Connections checked and tightened 10/13/22 0300   Phlebitis Assessment No symptoms 10/13/22 0300   Infiltration Assessment 0 10/13/22 0300   Alcohol Cap Used Yes 10/13/22 0300   Dressing Status Clean, dry & intact 10/13/22 0300   Dressing Type Transparent 10/13/22 0300        Opportunity for questions and clarification was provided.       Patient transported with:  Brina Starr

## 2022-10-14 ENCOUNTER — APPOINTMENT (OUTPATIENT)
Dept: CT IMAGING | Age: 62
End: 2022-10-14
Payer: COMMERCIAL

## 2022-10-14 ENCOUNTER — HOSPITAL ENCOUNTER (EMERGENCY)
Age: 62
Discharge: HOME | End: 2022-10-14
Attending: EMERGENCY MEDICINE
Payer: COMMERCIAL

## 2022-10-14 ENCOUNTER — APPOINTMENT (OUTPATIENT)
Dept: GENERAL RADIOLOGY | Age: 62
End: 2022-10-14
Payer: COMMERCIAL

## 2022-10-14 VITALS
WEIGHT: 194 LBS | HEIGHT: 70 IN | HEART RATE: 67 BPM | RESPIRATION RATE: 18 BRPM | SYSTOLIC BLOOD PRESSURE: 141 MMHG | OXYGEN SATURATION: 93 % | BODY MASS INDEX: 27.77 KG/M2 | DIASTOLIC BLOOD PRESSURE: 99 MMHG | TEMPERATURE: 98.8 F

## 2022-10-14 DIAGNOSIS — R50.82 POSTOPERATIVE FEVER: Primary | ICD-10-CM

## 2022-10-14 DIAGNOSIS — G89.18 POSTOPERATIVE PAIN: ICD-10-CM

## 2022-10-14 LAB
BILIRUB UR QL: NEGATIVE
BILIRUBIN, POC: NEGATIVE
BLOOD URINE, POC: NORMAL
CHP ED QC CHECK: YES
CLARITY, POC: CLEAR
COLOR, POC: YELLOW
GLUCOSE UR QL STRIP.AUTO: NEGATIVE MG/DL
GLUCOSE URINE, POC: NEGATIVE
KETONES UR-MCNC: 15 MG/DL
KETONES, POC: NORMAL
LACTATE SERPL-SCNC: 0.7 MMOL/L (ref 0.4–2)
LEUKOCYTE EST, POC: NEGATIVE
LEUKOCYTE ESTERASE UR QL STRIP: NEGATIVE
NITRITE UR QL: NEGATIVE
NITRITE, POC: NEGATIVE
PH UR: 6 [PH] (ref 5–9)
PH, POC: 6
PROT UR QL: NEGATIVE MG/DL
PROTEIN, POC: NEGATIVE
RBC # UR STRIP: ABNORMAL /UL
SP GR UR: 1.02 (ref 1–1.02)
SPECIFIC GRAVITY, POC: 1.02
UROBILINOGEN UR QL: 1 EU/DL (ref 0.2–1)
UROBILINOGEN, POC: NORMAL

## 2022-10-14 PROCEDURE — 6370000000 HC RX 637 (ALT 250 FOR IP): Performed by: EMERGENCY MEDICINE

## 2022-10-14 PROCEDURE — 87040 BLOOD CULTURE FOR BACTERIA: CPT

## 2022-10-14 PROCEDURE — 2580000003 HC RX 258: Performed by: EMERGENCY MEDICINE

## 2022-10-14 PROCEDURE — 81003 URINALYSIS AUTO W/O SCOPE: CPT

## 2022-10-14 PROCEDURE — 71045 X-RAY EXAM CHEST 1 VIEW: CPT

## 2022-10-14 PROCEDURE — 96365 THER/PROPH/DIAG IV INF INIT: CPT

## 2022-10-14 PROCEDURE — 75635 CT ANGIO ABDOMINAL ARTERIES: CPT

## 2022-10-14 PROCEDURE — 6360000004 HC RX CONTRAST MEDICATION: Performed by: EMERGENCY MEDICINE

## 2022-10-14 PROCEDURE — 6360000002 HC RX W HCPCS: Performed by: EMERGENCY MEDICINE

## 2022-10-14 PROCEDURE — 83605 ASSAY OF LACTIC ACID: CPT

## 2022-10-14 PROCEDURE — 99221 1ST HOSP IP/OBS SF/LOW 40: CPT | Performed by: SURGERY

## 2022-10-14 RX ORDER — CEFPODOXIME PROXETIL 200 MG/1
200 TABLET, FILM COATED ORAL 2 TIMES DAILY
Qty: 20 TABLET | Refills: 0 | Status: SHIPPED | OUTPATIENT
Start: 2022-10-14 | End: 2022-10-24

## 2022-10-14 RX ORDER — SODIUM CHLORIDE 0.9 % (FLUSH) 0.9 %
10 SYRINGE (ML) INJECTION
Status: COMPLETED | OUTPATIENT
Start: 2022-10-14 | End: 2022-10-14

## 2022-10-14 RX ORDER — HYDROCODONE BITARTRATE AND ACETAMINOPHEN 5; 325 MG/1; MG/1
1 TABLET ORAL
Status: COMPLETED | OUTPATIENT
Start: 2022-10-14 | End: 2022-10-14

## 2022-10-14 RX ORDER — ACETAMINOPHEN AND CODEINE PHOSPHATE 300; 30 MG/1; MG/1
1 TABLET ORAL EVERY 4 HOURS PRN
Qty: 18 TABLET | Refills: 0 | Status: SHIPPED | OUTPATIENT
Start: 2022-10-14 | End: 2022-10-17

## 2022-10-14 RX ORDER — 0.9 % SODIUM CHLORIDE 0.9 %
100 INTRAVENOUS SOLUTION INTRAVENOUS
Status: COMPLETED | OUTPATIENT
Start: 2022-10-14 | End: 2022-10-14

## 2022-10-14 RX ADMIN — SODIUM CHLORIDE, PRESERVATIVE FREE 10 ML: 5 INJECTION INTRAVENOUS at 07:30

## 2022-10-14 RX ADMIN — SODIUM CHLORIDE 100 ML: 9 INJECTION, SOLUTION INTRAVENOUS at 07:30

## 2022-10-14 RX ADMIN — HYDROCODONE BITARTRATE AND ACETAMINOPHEN 1 TABLET: 5; 325 TABLET ORAL at 09:04

## 2022-10-14 RX ADMIN — CEFTRIAXONE 1000 MG: 1 INJECTION, POWDER, FOR SOLUTION INTRAMUSCULAR; INTRAVENOUS at 03:14

## 2022-10-14 RX ADMIN — IOPAMIDOL 100 ML: 755 INJECTION, SOLUTION INTRAVENOUS at 07:30

## 2022-10-14 ASSESSMENT — ENCOUNTER SYMPTOMS
COUGH: 0
SORE THROAT: 0
RHINORRHEA: 0
NAUSEA: 0
DIARRHEA: 0
VOMITING: 0

## 2022-10-14 ASSESSMENT — PAIN DESCRIPTION - LOCATION: LOCATION: LEG

## 2022-10-14 ASSESSMENT — PAIN DESCRIPTION - ORIENTATION: ORIENTATION: RIGHT

## 2022-10-14 ASSESSMENT — PAIN SCALES - GENERAL: PAINLEVEL_OUTOF10: 7

## 2022-10-14 NOTE — ED NOTES
I have reviewed discharge instructions with the patient. The patient verbalized understanding. Patient left ED via Discharge Method: ambulatory to Home with self and/or family/friend. Patient states he has a ride coming to pick him up. Opportunity for questions and clarification provided. Patient given 2 scripts. To continue your aftercare when you leave the hospital, you may receive an automated call from our care team to check in on how you are doing. This is a free service and part of our promise to provide the best care and service to meet your aftercare needs.  If you have questions, or wish to unsubscribe from this service please call [phone]. Thank you for Choosing our [address] [place] [address] Emergency Department.         Grabiel Begum, RN  10/14/22 Christopher Mejía RN  10/14/22 1007

## 2022-10-14 NOTE — ED NOTES
Report handed off to Jim [facility]      Marlee Conley [facility]  10/14/22 8056

## 2022-10-14 NOTE — CONSULTS
[address] . [address] FAX: Ciera Winn.  1960    Chief Complaint   Patient presents with    Fever           HPI   Mr. Dave Alfredo. is a 63y.o. year old male who presents with worsening pain in his right knee with fevers overnight. 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 No current facility-administered medications for this encounter.      Current Outpatient Medications   Medication Sig Dispense Refill    apixaban (ELIQUIS) 2.5 MG TABS tablet Take 2.5 mg by mouth 2 times daily Indications: per [facility]-on for life(thrombos R-artery stent) 3 bxs given in hospital lot#[id number],exp1/2023/vfr      losartan (COZAAR) 25 MG tablet Take 25 mg by mouth daily      clopidogrel (PLAVIX) 75 MG tablet Take 1 tablet by mouth daily 30 tablet 3    Multiple Vitamin (MULTIVITAMIN ADULT PO) Take by mouth daily      aspirin 81 MG EC tablet Take 81 mg by mouth daily       No Known Allergies  Past Medical History:   Diagnosis Date    Diverticulosis of colon (without mention of hemorrhage) 2015    no symptoms    Dysrhythmia, cardiac     heart palpatations on occasion    Hypercholesterolemia     managed with meds    GABE on CPAP     central and obstructive, CPAP 9 cm H2O does not wear cpap    Personal history of colonic polyps 2015    adenoma, TVA    PVD (peripheral vascular disease) with claudication (HCC)     Snoring     Tobacco abuse      Family History   Problem Relation Age of Onset    Diabetes Brother     Stroke Maternal Grandmother     Diabetes Brother     Heart Disease Brother     Hypertension Father     Heart Disease Mother     Hypertension Sister     Heart Disease Father     Diabetes Father     Diabetes Sister      Past Surgical History:   Procedure Laterality Date    COLONOSCOPY  last 2/16/15    [facility]--three trans TA, one spl flx TVA, rectal TVA--3 year recall    KNEE ARTHROSCOPY Left 1996    VASCULAR SURGERY Left 6-18-15    profundoplasty of profunda femoris artery    VASCULAR SURGERY Left 8/29/2022    RIGHT LOWER EXTREMITY ARTERIOGRAM WITH BALLOON ANGIOPLASTY AND STENT OF POPLITEAL ARTERY performed by Clemente Avilez MD at [address] [place] [address] Right 10/12/2022    RIGHT LEG ARTERIOGRAM POSSIBLE THROMBOLYSIS performed by Clemente Avilez MD at [facility] MAIN OR    VASCULAR SURGERY Right 10/13/2022    ARTERIOGRAM RIGHT LEG LYSIS RECHECK performed by Clemente Avilez MD at [facility] MAIN OR     Social History     Tobacco Use    Smoking status: Every Day     Packs/day: 1.00     Years: 40.00     Pack years: 40.00     Types: Cigarettes    Smokeless tobacco: Never   Substance Use Topics    Alcohol use: Not Currently        Review of Systems  Constitutional: Negative for fever and chills. HENT: Negative for congestion and sore throat. Skin: Negative for rash and itching. Eyes: Negative for blurred vision and double vision. Respiratory: Negative for cough and shortness of breath. Cardiovascular: Negative for chest pain, palpitations, claudication and leg swelling. Gastrointestinal: Negative for nausea, vomiting and abdominal pain. Genitourinary: Negative for dysuria, hematuria and flank pain. Musculoskeletal: Negative for joint pain and falls. Neurological: Negative for dizziness, sensory change, focal weakness, loss of consciousness and headaches. PHYSICAL EXAM   [unfilled]     Constitutional: he appears well-developed. No distress. HENT: Hearing intact. Head: Atraumatic. Eyes: Pupils are equal, round, and reactive to light. Neck: Normal range of motion. Cardiovascular: Regular rhythm. Pulmonary/Chest: Effort normal and breath sounds normal. No respiratory distress. Abdominal: Soft. Bowel sounds are normal. he exhibits no distension. There is no tenderness. There is no guarding. No hernia. Musculoskeletal: Normal range of motion.    Neurological: He is alert. CN II- XII grossly intact  Skin: Warm and dry. Vascular: Palpable femoral pulses Doppler pedal pulses      Imaging Results  CTA show patent right popliteal artery stent with an occluded popliteal artery aneurysm with peroneal being dominant runoff to the foot    Bao Martinez. is a 63y.o. year old male status post thrombolysis, patient is well-perfused with Doppler pedal pulses and CTA show patent stent. Unsure etiology of pain. Would recommend anti-inflammatory and will follow-up in 2 weeks with vascular. Patient also is to start his anticoagulation today with indication being recent thrombosis of right SFA stent with ischemic changes. Elements of this note have been dictated using speech recognition software. As a result, errors of speech recognition may have occurred. 50 minutes of time was spent on this consult with greater than 50% of time spent face-to-face with the patient discussing the disease process, education and treatment options.

## 2022-10-14 NOTE — ED TRIAGE NOTES
Patient to ER via POV with c/o of left posterior  leg and thigh pain, he had vascular surgery here yesterday and was discharged since going home his pain is a 10/10, he is having increased temp and high blood pressure.

## 2022-10-14 NOTE — ED PROVIDER NOTES
Patient disposition turned over to me by Dr. Primo Elizondo at change of shift pending CT evaluation. Dr. Lupis Alex did come to the emergency department and see the patient personally. He reported that his review of the CT did not demonstrate any evidence of arterial occlusion however the radiology interpretation is still pending. Patient on exam exhibits point tenderness in the right popliteal fossa area and is complaining of pain from the popliteal fossa into the calf. There is no erythema or swelling or bruising noted.      Eugena Cooler, DO  10/14/22 7216

## 2022-10-14 NOTE — ED PROVIDER NOTES
Emergency Department Provider Note                   PCP:                CECIL Miller NP               Age: 63 y.o. Sex: male       ICD-10-CM    1. Postoperative fever  R50.82       2. Postoperative pain  G89.18 acetaminophen-codeine (TYLENOL/CODEINE #3) 300-30 MG per tablet          DISPOSITION Decision To Discharge 10/14/2022 09:34:31 AM        MDM  Number of Diagnoses or Management Options  Postoperative fever  Postoperative pain  Diagnosis management comments: Patient signed out to Dr. Mando Diamond at 0 700       Amount and/or Complexity of Data Reviewed  Clinical lab tests: ordered and reviewed  Tests in the radiology section of CPT®: ordered and reviewed  Review and summarize past medical records: yes  Independent visualization of images, tracings, or specimens: yes    Risk of Complications, Morbidity, and/or Mortality  Presenting problems: moderate  Diagnostic procedures: moderate  Management options: moderate         ED Course as of 10/14/22 2342   Fri Oct 14, 2022   0609 Spoke with Dr. Alicia Darby and he wished for us to do a CT of his abdomen pelvis with IV contrast as well as bilateral lower extremities.  [SH]      ED Course User Index  [SH] Jarrell Fuller MD        Orders Placed This Encounter   Procedures    Blood Culture 1    Blood Culture 2    XR CHEST PORTABLE    CTA ABDOMINAL AORTA W BILAT RUNOFF W WO CONTRAST    CBC    Comprehensive Metabolic Panel    Lactate, Sepsis    POCT Urinalysis no Micro    POCT Urinalysis no Micro        Medications   cefTRIAXone (ROCEPHIN) 1,000 mg in sodium chloride 0.9 % 50 mL IVPB mini-bag (0 mg IntraVENous Stopped 10/14/22 0344)   0.9 % sodium chloride bolus (0 mLs IntraVENous Stopped 10/14/22 0731)   sodium chloride flush 0.9 % injection 10 mL (10 mLs IntraVENous Given 10/14/22 0730)   iopamidol (ISOVUE-370) 76 % injection 100 mL (100 mLs IntraVENous Given 10/14/22 0730)   HYDROcodone-acetaminophen (NORCO) 5-325 MG per tablet 1 tablet (1 tablet Oral Given 10/14/22 0904)       Discharge Medication List as of 10/14/2022  9:54 AM        START taking these medications    Details   cefpodoxime (VANTIN) 200 MG tablet Take 1 tablet by mouth 2 times daily for 10 days, Disp-20 tablet, R-0Normal      acetaminophen-codeine (TYLENOL/CODEINE #3) 300-30 MG per tablet Take 1 tablet by mouth every 4 hours as needed for Pain for up to 3 days. Intended supply: 3 days. Take lowest dose possible to manage pain, Disp-18 tablet, R-0Normal              Dwayne Manzanares is a 63 y.o. male who presents to the Emergency Department with chief complaint of    Chief Complaint   Patient presents with    Fever      Patient is a 61-year-old male who presents with fever onset approximately 10 PM last night of 102.7. Patient had no chest pain chest pressure shortness of breath nausea vomiting. Patient was just discharged 10/13/2022 in the afternoon after undergoing a right leg arteriogram with thrombolysis by Dr. Miles Orellana for an occluded popliteal artery stent. 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 Patient also states at the time he had a fever he started having right posterior thigh and right calf pain. The history is provided by the patient. Fever  Max temp prior to arrival:  102.7  Temp source:  Oral  Severity:  Moderate  Onset quality:  Gradual  Duration:  3 hours  Timing:  Constant  Progression:  Resolved  Chronicity:  New  Relieved by:  None tried  Worsened by:  Nothing  Associated symptoms: no chest pain, no cough, no diarrhea, no myalgias, no nausea, no rhinorrhea, no somnolence, no sore throat and no vomiting    Risk factors: no contaminated food, no immunosuppression and no occupational exposure        Review of Systems   Constitutional:  Positive for fever. HENT:  Negative for rhinorrhea and sore throat. Respiratory:  Negative for cough. Cardiovascular:  Negative for chest pain. Gastrointestinal:  Negative for diarrhea, nausea and vomiting. Musculoskeletal:  Negative for myalgias. All other systems reviewed and are negative. Past Medical History:   Diagnosis Date    Diverticulosis of colon (without mention of hemorrhage) 2015    no symptoms    Dysrhythmia, cardiac     heart palpatations on occasion    Hypercholesterolemia     managed with meds    GABE on CPAP     central and obstructive, CPAP 9 cm H2O does not wear cpap    Personal history of colonic polyps 2015    adenoma, TVA    PVD (peripheral vascular disease) with claudication ([address]     Snoring     Tobacco abuse         Past Surgical History:   Procedure Laterality Date    COLONOSCOPY  last 2/16/15    Uma--three trans TA, one spl flx TVA, rectal TVA--3 year recall    KNEE ARTHROSCOPY Left 1996    VASCULAR SURGERY Left 6-18-15    profundoplasty of profunda femoris artery    VASCULAR SURGERY Left 8/29/2022    RIGHT LOWER EXTREMITY ARTERIOGRAM WITH BALLOON ANGIOPLASTY AND STENT OF POPLITEAL ARTERY performed by Annika Sandhu MD at [facility] MAIN OR    VASCULAR SURGERY Right 10/12/2022    RIGHT LEG ARTERIOGRAM POSSIBLE THROMBOLYSIS performed by Annika Sandhu MD at [facility] MAIN OR    VASCULAR SURGERY Right 10/13/2022    ARTERIOGRAM RIGHT LEG LYSIS RECHECK performed by Annika Sandhu MD at [facility] MAIN OR        Family History   Problem Relation Age of Onset    Diabetes Brother     Stroke Maternal Grandmother     Diabetes Brother     Heart Disease Brother     Hypertension Father     Heart Disease Mother     Hypertension Sister     Heart Disease Father     Diabetes Father     Diabetes Sister         Social History     Socioeconomic History    Marital status:    Tobacco Use    Smoking status: Every Day     Packs/day: 1.00     Years: 40.00     Pack years: 40.00     Types: Cigarettes    Smokeless tobacco: Never   Substance and Sexual Activity    Alcohol use: Not Currently         Patient has no known allergies.      Discharge Medication List as of 10/14/2022  9:54 AM        CONTINUE these medications which have NOT CHANGED    Details   apixaban (ELIQUIS) 2.5 MG TABS tablet Take 2.5 mg by mouth 2 times daily Indications: per DTW-on for life(thrombos R-artery stent) 3 bxs given in hospital lot#[id number],exp1/2023/vfrHistorical Med      losartan (COZAAR) 25 MG tablet Take 25 mg by mouth dailyHistorical Med      clopidogrel (PLAVIX) 75 MG tablet Take 1 tablet by mouth daily, Disp-30 tablet, R-3Normal      Multiple Vitamin (MULTIVITAMIN ADULT PO) Take by mouth dailyHistorical Med      aspirin 81 MG EC tablet Take 81 mg by mouth dailyHistorical Med              Vitals signs and nursing note reviewed. No data found. Physical Exam  Vitals and nursing note reviewed. Constitutional:       Appearance: Normal appearance. HENT:      Head: Normocephalic and atraumatic. Nose: Nose normal.      Mouth/Throat:      Mouth: Mucous membranes are moist.   Eyes:      Extraocular Movements: Extraocular movements intact. Conjunctiva/sclera: Conjunctivae normal.      Pupils: Pupils are equal, round, and reactive to light. Cardiovascular:      Rate and Rhythm: Normal rate. Pulses: Normal pulses. Heart sounds: Normal heart sounds. Comments: Patient has a 2+ dorsalis pedis pulse and his foot. Pulmonary:      Effort: Pulmonary effort is normal.      Breath sounds: Normal breath sounds. Abdominal:      General: Abdomen is flat. Musculoskeletal:         General: Normal range of motion. Cervical back: Normal range of motion and neck supple. Skin:     General: Skin is warm. Capillary Refill: Capillary refill takes less than 2 seconds. Neurological:      General: No focal deficit present. Mental Status: He is alert and oriented to person, place, and time. Psychiatric:         Mood and Affect: Mood normal.         Behavior: Behavior normal.         Thought Content:  Thought content normal.         Judgment: Judgment normal.        Procedures    Results for orders placed or performed during the hospital encounter of 10/14/22   XR CHEST PORTABLE    Narrative    EXAM: Chest x-ray. INDICATION: Fever. COMPARISON: None. TECHNIQUE: Single frontal view. FINDINGS: The lungs are clear. The cardiac size, mediastinal contour and  pulmonary vasculature are normal. No pneumothorax or pleural effusion is seen. The bones are intact. Impression    No acute process. CTA ABDOMINAL AORTA W BILAT RUNOFF W WO CONTRAST    Narrative    Title:  CT arteriogram of the abdomen, pelvis and lower extremities. Indication:  Left groin pain following recent arterial catheterization. Fever. Technique: Axial images of the abdomen, pelvis and both lower extremities were  obtained after the administration of intravenous iodinated contrast media. Contrast was used to best identify the arterial structures. Images were reviewed  on a separate, free standing, three-dimensional workstation as per the referring  physicians request.      All CT scans at this facility are performed using dose reduction/dose modulation  techniques, as appropriate the performed exam, including the following:   Automated Exposure Control; Adjustment of the mA and/or kV according to patient  size (this includes techniques or standardized protocols for targeted exams  where dose is matched to indication/reason for exam); and Use of Iterative  Reconstruction Technique. Comparison: CTA 7/28/2022. Findings:     Thoracic: Mild atelectatic changes in the right lower lobe. The visualized lung  bases are otherwise clear. Abdomen/pelvis: The spleen and pancreas are within normal limits. Adrenal glands  are diffusely thickened bilaterally without focal nodularity. No hydronephrosis  or renal calculi. No focal hepatic lesions are identified. No biliary ductal  dilation. The gallbladder appears is without thickening or pericholecystic  fluid.  Possible small gallstone (series 301, image 278). No evidence of bowel  obstruction. Colonic diverticulosis without evidence of diverticulitis. Prostatomegaly. Negative for ascites. No peritoneal or retroperitoneal  adenopathy or mass lesions. The appendix is within normal limits. The portal  system appears to be patent. Small bilateral hydroceles with small calcific  densities present within the scrotum. Musculoskeletal:  No destructive lesion. Recent postsurgical changes to the left  groin. No drainable fluid collections. No evidence of pseudoaneurysm formation. Moderate multilevel degenerative changes in the lumbar spine. Vascular: Moderate generalized atherosclerotic changes. The aorta is not  aneurysmal. No significant aortic stenosis is identified. The celiac and  mesenteric arteries are patent without significant stenosis. The inferior  mesenteric artery appears be patent without stenosis. The renal arteries appear  to be patent without significant stenosis. .    Right lower extremity: The right common and external iliac arteries are patent  without significant stenosis. Moderate to high-grade stenosis at the origin of  the right internal iliac artery. The right common femoral artery is patent  without stenosis. Aneurysmal dilation of the proximal right profunda femoral  artery which measures approximately 2.3 x 2.0 cm in diameter. A moderate  stenosis is present at the right profunda femoral artery origin. The right SFA  is patent, with mild stenoses present in its distal aspect. Endovascular stents  are present spanning the distal right SFA to the popliteal artery. The  endovascular stents appear to be patent without significant stenosis. Multifocal  stenoses involving the below-knee three-vessel runoff with probable occlusion of  the left posterior tibial artery. The peroneal artery appears to be patent. Multifocal stenoses involving the right anterior tibial artery throughout its  course.  The aneurysm sac of the right L2 artery measures 2.6 x 2.1 cm in  diameter. There is a new low-density fluid collection surrounding the stents  above the knee with suggestion of rim enhancement. Inflammatory changes are also  present in the adjacent soft tissues as evidenced by fat stranding. These  findings are best seen on series 301, image 1245. Left lower extremity: The left common, and external iliac arteries are patent  without significant stenosis. Moderate to high-grade stenosis at the origin of  the left internal iliac artery. The left common femoral artery is patent without  significant stenosis. Negative for pseudoaneurysm. Aneurysmal dilation of the  proximal left profunda femoral artery. The aneurysm measures approximately 2.6 x  2.5 cm in diameter. Mild stenosis at the origin of the left SFA. The left SFA is  patent with multifocal mild stenoses throughout its course. There is occlusion  of the left SFA in its distal aspect which is unchanged. The left popliteal  artery is chronically occluded. Intermittent poor opacification of the  below-knee three-vessel runoff. Varicose veins are present in the bilateral lower extremities. .      Impression    1. RIGHT: No large vessel occlusion or high-grade stenosis identified. Patent  endovascular stent spanning the distal SFA to the popliteal artery. There is a  new thin fluid collection surrounding the stent with suggestion of rim  enhancement. Some inflammatory changes are also present in the adjacent soft  tissues. While imaging findings are not confirmatory, infection is in the  differential. Moderate to high-grade stenosis in the origin of the right  internal iliac artery. 2. LEFT: Chronic occlusion of the distal left SFA and left popliteal artery. Negative for pseudoaneurysm at the access site. 3.  Varicose veins are present in the bilateral lower extremity. 4.  Possible cholelithiasis, ultrasound could further evaluate as clinically  indicated.   5. Prostatomegaly. Findings were called to Dr. Hayden Melendez by Dr. Bettie Betancourt on 10/14/2022 at 1148 hours  via telephone. CBC   Result Value Ref Range    WBC 11.5 (H) 4.3 - 11.1 K/uL    RBC 4.86 4.23 - 5.6 M/uL    Hemoglobin 14.5 13.6 - 17.2 g/dL    Hematocrit 42.7 41.1 - 50.3 %    MCV 87.9 82 - 102 FL    MCH 29.8 26.1 - 32.9 PG    MCHC 34.0 31.4 - 35.0 g/dL    RDW 11.8 (L) 11.9 - 14.6 %    Platelets 240 760 - 962 K/uL    MPV 9.8 9.4 - 12.3 FL    nRBC 0.00 0.0 - 0.2 K/uL   Comprehensive Metabolic Panel   Result Value Ref Range    Sodium 136 133 - 143 mmol/L    Potassium 3.6 3.5 - 5.1 mmol/L    Chloride 104 101 - 110 mmol/L    CO2 23 21 - 32 mmol/L    Anion Gap 9 2 - 11 mmol/L    Glucose 133 (H) 65 - 100 mg/dL    BUN 11 8 - 23 MG/DL    Creatinine 0.70 (L) 0.8 - 1.5 MG/DL    Est, Glom Filt Rate >60 >60 ml/min/1.73m2    Calcium 9.3 8.3 - 10.4 MG/DL    Total Bilirubin 0.7 0.2 - 1.1 MG/DL    ALT 15 12 - 65 U/L    AST 10 (L) 15 - 37 U/L    Alk Phosphatase 77 50 - 136 U/L    Total Protein 7.6 6.3 - 8.2 g/dL    Albumin 3.5 3.2 - 4.6 g/dL    Globulin 4.1 2.8 - 4.5 g/dL    Albumin/Globulin Ratio 0.9 0.4 - 1.6     Lactate, Sepsis   Result Value Ref Range    Lactic Acid, Sepsis 0.7 0.4 - 2.0 MMOL/L   POCT Urinalysis no Micro   Result Value Ref Range    Color, UA yellow     Clarity, UA clear     Glucose, UA POC negative     Bilirubin, UA negative     Ketones, UA 15mg/dL     Spec Grav, UA 1.025     pH, UA 6.0     Protein, UA POC negative     Urobilinogen, UA 1.0 E.U./dL     Nitrite, UA negative     Leukocytes, UA negative     Blood, UA POC small     QC OK?  yes    POCT Urinalysis no Micro   Result Value Ref Range    Specific Gravity, Urine, POC 1.025 (H) 1.001 - 1.023      pH, Urine, POC 6.0 5.0 - 9.0      Protein, Urine, POC Negative NEG mg/dL    Glucose, UA POC Negative NEG mg/dL    Ketones, Urine, POC 15 (A) NEG mg/dL    Bilirubin, Urine, POC Negative NEG      Blood, UA POC SMALL (A) NEG      URINE UROBILINOGEN POC 1.0 0.2 - 1.0 EU/dL    Nitrate, Urine, POC Negative NEG      Leukocyte Est, UA POC Negative NEG          CTA ABDOMINAL AORTA W BILAT RUNOFF W WO CONTRAST   Final Result      1. RIGHT: No large vessel occlusion or high-grade stenosis identified. Patent   endovascular stent spanning the distal SFA to the popliteal artery. There is a   new thin fluid collection surrounding the stent with suggestion of rim   enhancement. Some inflammatory changes are also present in the adjacent soft   tissues. While imaging findings are not confirmatory, infection is in the   differential. Moderate to high-grade stenosis in the origin of the right   internal iliac artery. 2. LEFT: Chronic occlusion of the distal left SFA and left popliteal artery. Negative for pseudoaneurysm at the access site. 3.  Varicose veins are present in the bilateral lower extremity. 4.  Possible cholelithiasis, ultrasound could further evaluate as clinically   indicated. 5.  Prostatomegaly. Findings were called to Dr. Rasheed Pastrana by Dr. Delmar Linn on 10/14/2022 at 1148 hours   via telephone. XR CHEST PORTABLE   Final Result   No acute process. Voice dictation software was used during the making of this note. This software is not perfect and grammatical and other typographical errors may be present. This note has not been completely proofread for errors.        Van Murguia MD  10/14/22 2010

## 2022-10-19 LAB
BACTERIA SPEC CULT: NORMAL
BACTERIA SPEC CULT: NORMAL
SERVICE CMNT-IMP: NORMAL
SERVICE CMNT-IMP: NORMAL

## 2022-10-28 ENCOUNTER — OFFICE VISIT (OUTPATIENT)
Dept: VASCULAR SURGERY | Age: 62
End: 2022-10-28
Payer: COMMERCIAL

## 2022-10-28 VITALS
BODY MASS INDEX: 27.77 KG/M2 | SYSTOLIC BLOOD PRESSURE: 159 MMHG | DIASTOLIC BLOOD PRESSURE: 91 MMHG | TEMPERATURE: 97.1 F | OXYGEN SATURATION: 99 % | HEIGHT: 70 IN | WEIGHT: 194 LBS | HEART RATE: 70 BPM

## 2022-10-28 DIAGNOSIS — I73.9 PVD (PERIPHERAL VASCULAR DISEASE) WITH CLAUDICATION (HCC): Primary | ICD-10-CM

## 2022-10-28 PROCEDURE — 99024 POSTOP FOLLOW-UP VISIT: CPT | Performed by: SURGERY

## 2022-10-28 NOTE — PROGRESS NOTES
[address] FAX: [phone] [facility]. : 1960    Chief Complaint:     History of Present Illness:   Patient follows up today for follow-up status post lysis for occluded right popliteal stent. Patient denies any abdominal pain or back pain. CURRENT MEDICATIONS:  Current Outpatient Medications   Medication Sig Dispense Refill    apixaban (ELIQUIS) 2.5 MG TABS tablet Take 1 tablet by mouth 2 times daily 60 tablet 5    apixaban (ELIQUIS) 2.5 MG TABS tablet Take 2.5 mg by mouth 2 times daily Indications: per DTW-on for life(thrombos R-artery stent) 3 bxs given in hospital lot#[id number],exp2023/vfr      losartan (COZAAR) 25 MG tablet Take 25 mg by mouth daily      Multiple Vitamin (MULTIVITAMIN ADULT PO) Take by mouth daily      aspirin 81 MG EC tablet Take 81 mg by mouth daily      clopidogrel (PLAVIX) 75 MG tablet Take 1 tablet by mouth daily 30 tablet 3     No current facility-administered medications for this visit. Past Medical History:   Diagnosis Date    Diverticulosis of colon (without mention of hemorrhage)     no symptoms    Dysrhythmia, cardiac     heart palpatations on occasion    Hypercholesterolemia     managed with meds    GABE on CPAP     central and obstructive, CPAP 9 cm H2O does not wear cpap    Personal history of colonic polyps 2015    adenoma, TVA    PVD (peripheral vascular disease) with claudication (HCC)     Snoring     Tobacco abuse        Physical Examination:   Height: 5' 10\" (1.778 m), Weight: 194 lb (88 kg), BP: (!) 159/91    Constitutional: he appears well-developed. No distress. HENT:   Head: Atraumatic. Eyes: Pupils are equal, round, and reactive to light. Neck: Normal range of motion. Cardiovascular: Regular rhythm. Pulmonary/Chest: Effort normal and breath sounds normal. No respiratory distress. Abdominal: Soft. Bowel sounds are normal. he exhibits no distension. There is no tenderness. There is no guarding. No hernia. Musculoskeletal: Normal range of motion. Neurological: He is alert. CN II- XII grossly intact   Vascular: Palp femoral pulses Doppler pedal pulses    Imaging:          Recommendations/Plans:   Mr. Denia Scott is a 63y.o. year old male status post thrombolysis of right popliteal artery stent. Follow-up in 6 months with duplex study. Continues anticoagulation. Antonio Curry MD    Elements of this note have been dictated using speech recognition software. As a result, errors of speech recognition may have occurred.     30 minutes of time was spent on this encounter including chart review, assessment, evaluation and coordination of patient care

## 2022-10-31 ENCOUNTER — TELEPHONE (OUTPATIENT)
Dept: VASCULAR SURGERY | Age: 62
End: 2022-10-31

## 2022-10-31 NOTE — TELEPHONE ENCOUNTER
Patient called looking to have additional notation sent to [facility] so he can return to work. Informed patient that paperwork has been faxed to [facility] and [facility]. Patient would like us to send letter stating that patient can return to work with no restrictions along with last office visit notes.

## 2022-10-31 NOTE — TELEPHONE ENCOUNTER
Informed patient that last office notes and sheets that states patient can return to work without restrictions has been faxed to [facility] ([phone]). Patient verbally understood and will let us know if anything else will be needed.

## 2022-11-09 ENCOUNTER — OFFICE VISIT (OUTPATIENT)
Dept: PRIMARY CARE CLINIC | Facility: CLINIC | Age: 62
End: 2022-11-09
Payer: COMMERCIAL

## 2022-11-09 VITALS
HEIGHT: 68 IN | WEIGHT: 192 LBS | SYSTOLIC BLOOD PRESSURE: 126 MMHG | OXYGEN SATURATION: 97 % | HEART RATE: 80 BPM | TEMPERATURE: 98.4 F | BODY MASS INDEX: 29.1 KG/M2 | DIASTOLIC BLOOD PRESSURE: 71 MMHG

## 2022-11-09 DIAGNOSIS — I73.9 PAD (PERIPHERAL ARTERY DISEASE) (HCC): ICD-10-CM

## 2022-11-09 DIAGNOSIS — R73.9 ELEVATED BLOOD SUGAR: ICD-10-CM

## 2022-11-09 DIAGNOSIS — I70.219 ATHEROSCLEROSIS OF NATIVE ARTERY OF EXTREMITY WITH INTERMITTENT CLAUDICATION, UNSPECIFIED EXTREMITY (HCC): ICD-10-CM

## 2022-11-09 DIAGNOSIS — R94.31 ABNORMAL EKG: ICD-10-CM

## 2022-11-09 DIAGNOSIS — I70.201 RIGHT POPLITEAL ARTERY OCCLUSION (HCC): ICD-10-CM

## 2022-11-09 DIAGNOSIS — Z12.5 SCREENING FOR PROSTATE CANCER: ICD-10-CM

## 2022-11-09 DIAGNOSIS — Z71.6 ENCOUNTER FOR SMOKING CESSATION COUNSELING: ICD-10-CM

## 2022-11-09 DIAGNOSIS — E78.5 HYPERLIPIDEMIA, UNSPECIFIED HYPERLIPIDEMIA TYPE: ICD-10-CM

## 2022-11-09 DIAGNOSIS — I82.401 ACUTE DEEP VEIN THROMBOSIS (DVT) OF RIGHT LOWER EXTREMITY, UNSPECIFIED VEIN (HCC): ICD-10-CM

## 2022-11-09 DIAGNOSIS — Z72.0 TOBACCO USE: ICD-10-CM

## 2022-11-09 DIAGNOSIS — Z23 IMMUNIZATION DUE: ICD-10-CM

## 2022-11-09 DIAGNOSIS — N52.9 ERECTILE DYSFUNCTION, UNSPECIFIED ERECTILE DYSFUNCTION TYPE: ICD-10-CM

## 2022-11-09 DIAGNOSIS — G47.33 OBSTRUCTIVE SLEEP APNEA SYNDROME: ICD-10-CM

## 2022-11-09 DIAGNOSIS — G47.39 OTHER SLEEP APNEA: ICD-10-CM

## 2022-11-09 DIAGNOSIS — I10 PRIMARY HYPERTENSION: Primary | ICD-10-CM

## 2022-11-09 PROCEDURE — 90674 CCIIV4 VAC NO PRSV 0.5 ML IM: CPT | Performed by: FAMILY MEDICINE

## 2022-11-09 PROCEDURE — 90471 IMMUNIZATION ADMIN: CPT | Performed by: FAMILY MEDICINE

## 2022-11-09 PROCEDURE — 3078F DIAST BP <80 MM HG: CPT | Performed by: FAMILY MEDICINE

## 2022-11-09 PROCEDURE — 3074F SYST BP LT 130 MM HG: CPT | Performed by: FAMILY MEDICINE

## 2022-11-09 PROCEDURE — 99204 OFFICE O/P NEW MOD 45 MIN: CPT | Performed by: FAMILY MEDICINE

## 2022-11-09 RX ORDER — ATORVASTATIN CALCIUM 20 MG/1
20 TABLET, FILM COATED ORAL DAILY
Qty: 90 TABLET | Refills: 0 | Status: SHIPPED | OUTPATIENT
Start: 2022-11-09

## 2022-11-09 RX ORDER — BUPROPION HYDROCHLORIDE 150 MG/1
150 TABLET, EXTENDED RELEASE ORAL 2 TIMES DAILY
Qty: 180 TABLET | Refills: 0 | Status: SHIPPED | OUTPATIENT
Start: 2022-11-09

## 2022-11-09 RX ORDER — SILDENAFIL 50 MG/1
50 TABLET, FILM COATED ORAL PRN
Qty: 30 TABLET | Refills: 2 | Status: SHIPPED | OUTPATIENT
Start: 2022-11-09

## 2022-11-09 RX ORDER — LOSARTAN POTASSIUM 25 MG/1
25 TABLET ORAL DAILY
Qty: 90 TABLET | Refills: 0 | Status: SHIPPED | OUTPATIENT
Start: 2022-11-09

## 2022-11-09 SDOH — ECONOMIC STABILITY: FOOD INSECURITY: WITHIN THE PAST 12 MONTHS, THE FOOD YOU BOUGHT JUST DIDN'T LAST AND YOU DIDN'T HAVE MONEY TO GET MORE.: NEVER TRUE

## 2022-11-09 SDOH — ECONOMIC STABILITY: FOOD INSECURITY: WITHIN THE PAST 12 MONTHS, YOU WORRIED THAT YOUR FOOD WOULD RUN OUT BEFORE YOU GOT MONEY TO BUY MORE.: NEVER TRUE

## 2022-11-09 SDOH — ECONOMIC STABILITY: TRANSPORTATION INSECURITY
IN THE PAST 12 MONTHS, HAS LACK OF TRANSPORTATION KEPT YOU FROM MEETINGS, WORK, OR FROM GETTING THINGS NEEDED FOR DAILY LIVING?: NO

## 2022-11-09 SDOH — ECONOMIC STABILITY: TRANSPORTATION INSECURITY
IN THE PAST 12 MONTHS, HAS THE LACK OF TRANSPORTATION KEPT YOU FROM MEDICAL APPOINTMENTS OR FROM GETTING MEDICATIONS?: NO

## 2022-11-09 ASSESSMENT — PATIENT HEALTH QUESTIONNAIRE - PHQ9
1. LITTLE INTEREST OR PLEASURE IN DOING THINGS: 0
SUM OF ALL RESPONSES TO PHQ QUESTIONS 1-9: 0
SUM OF ALL RESPONSES TO PHQ9 QUESTIONS 1 & 2: 0
SUM OF ALL RESPONSES TO PHQ QUESTIONS 1-9: 0
SUM OF ALL RESPONSES TO PHQ QUESTIONS 1-9: 0
2. FEELING DOWN, DEPRESSED OR HOPELESS: 0
SUM OF ALL RESPONSES TO PHQ QUESTIONS 1-9: 0

## 2022-11-09 ASSESSMENT — LIFESTYLE VARIABLES
HOW MANY STANDARD DRINKS CONTAINING ALCOHOL DO YOU HAVE ON A TYPICAL DAY: PATIENT DOES NOT DRINK
HOW OFTEN DO YOU HAVE A DRINK CONTAINING ALCOHOL: NEVER

## 2022-11-09 ASSESSMENT — SOCIAL DETERMINANTS OF HEALTH (SDOH): HOW HARD IS IT FOR YOU TO PAY FOR THE VERY BASICS LIKE FOOD, HOUSING, MEDICAL CARE, AND HEATING?: NOT HARD AT ALL

## 2022-11-15 PROBLEM — R94.31 ABNORMAL EKG: Status: ACTIVE | Noted: 2022-11-15

## 2022-11-15 PROBLEM — I73.9 PAD (PERIPHERAL ARTERY DISEASE) (HCC): Status: ACTIVE | Noted: 2022-11-15

## 2022-11-15 PROBLEM — Z12.5 SCREENING FOR PROSTATE CANCER: Status: ACTIVE | Noted: 2022-11-15

## 2022-11-15 PROBLEM — E78.5 HYPERLIPIDEMIA: Status: ACTIVE | Noted: 2022-11-15

## 2022-11-15 PROBLEM — N52.9 ERECTILE DYSFUNCTION: Status: ACTIVE | Noted: 2022-11-15

## 2022-11-15 PROBLEM — I10 PRIMARY HYPERTENSION: Status: ACTIVE | Noted: 2022-11-15

## 2022-11-15 PROBLEM — I82.401 ACUTE DEEP VEIN THROMBOSIS (DVT) OF RIGHT LOWER EXTREMITY (HCC): Status: ACTIVE | Noted: 2022-11-15

## 2022-11-15 PROBLEM — Z72.0 TOBACCO USE: Status: ACTIVE | Noted: 2022-11-15

## 2022-11-15 PROBLEM — Z23 IMMUNIZATION DUE: Status: ACTIVE | Noted: 2022-11-15

## 2022-11-15 PROBLEM — R73.9 ELEVATED BLOOD SUGAR: Status: ACTIVE | Noted: 2022-11-15

## 2022-11-15 PROBLEM — Z71.6 ENCOUNTER FOR SMOKING CESSATION COUNSELING: Status: ACTIVE | Noted: 2022-11-15

## 2022-11-15 ASSESSMENT — ENCOUNTER SYMPTOMS
RESPIRATORY NEGATIVE: 1
EYES NEGATIVE: 1
ALLERGIC/IMMUNOLOGIC NEGATIVE: 1
GASTROINTESTINAL NEGATIVE: 1

## 2022-11-15 NOTE — PROGRESS NOTES
[address] [facility], [facility] [address]  Office : [phone]  Fax : [phone]      Subjective: The patient is a 63 y.o. male  who presents for f/u on multiple chronic medical conditions-good compliance with medications--pt here to get routeine labs and need refill on meds. no cardiopulmonary symptoms  Hypertension--Pt BP been controlled with meds  Prediabetes -pts blood sugar stable on diet-NEED A1C CHECK  Hyperlipidemia--pts on low carb diet and low fat diet -stable on med     Diverticulosis of colon (without mention of hemorrhage) 2015   no symptoms    Dysrhythmia, cardiac   heart palpatations on occasion    GABE on CPAP   central and obstructive, CPAP 9 cm H2O does not wear cpap    Personal history of colonic polyps 2015   adenoma, TVA    PVD (peripheral vascular disease) with claudication (HCC)    Snoring    Tobacco abuse -CT chest neg in 2021    Patient Active Problem List   Diagnosis    Hypercholesterolemia    Snoring    Atherosclerosis of native artery of extremity with intermittent claudication (HCC)    Sleep apnea    Peripheral artery disease (HCC)    Dysrhythmia, cardiac    Popliteal artery aneurysm, bilateral (HCC)    Popliteal artery occlusion, left (HCC)    Non-recurrent bilateral inguinal hernia without obstruction or gangrene    Right popliteal artery occlusion (HCC)    Acute deep vein thrombosis (DVT) of right lower extremity (HCC)    Elevated blood sugar    Abnormal EKG    Hyperlipidemia    Erectile dysfunction    Encounter for smoking cessation counseling    Screening for prostate cancer    Tobacco use    Immunization due    Primary hypertension    PAD (peripheral artery disease) ([facility] [address]       Past Medical History:   Diagnosis Date    Diverticulosis of colon (without mention of hemorrhage) 2015    no symptoms    Dysrhythmia, cardiac     heart palpatations on occasion    Hypercholesterolemia     managed with meds    GABE on CPAP     central and obstructive, CPAP 9 cm H2O does not wear cpap    Personal history of colonic polyps 2015    adenoma, TVA    PVD (peripheral vascular disease) with claudication (HCC)     Snoring     Tobacco abuse        Past Surgical History:   Procedure Laterality Date    COLONOSCOPY  last 2/16/15    Uma--three trans TA, one spl flx TVA, rectal TVA--3 year recall    KNEE ARTHROSCOPY Left 1996    VASCULAR SURGERY Left 6-18-15    profundoplasty of profunda femoris artery    VASCULAR SURGERY Left 8/29/2022    RIGHT LOWER EXTREMITY ARTERIOGRAM WITH BALLOON ANGIOPLASTY AND STENT OF POPLITEAL ARTERY performed by Jim Sun MD at [facility] MAIN OR    VASCULAR SURGERY Right 10/12/2022    RIGHT LEG ARTERIOGRAM POSSIBLE THROMBOLYSIS performed by Jim Sun MD at [facility] MAIN OR    VASCULAR SURGERY Right 10/13/2022    ARTERIOGRAM RIGHT LEG LYSIS RECHECK performed by Jim Sun MD at [facility] MAIN OR       Social History     Socioeconomic History    Marital status:      Spouse name: Not on file    Number of children: Not on file    Years of education: Not on file    Highest education level: Not on file   Occupational History    Not on file   Tobacco Use    Smoking status: Every Day     Packs/day: 1.00     Years: 48.00     Pack years: 48.00     Types: Cigarettes     Start date: 1974    Smokeless tobacco: Never   Substance and Sexual Activity    Alcohol use: Not Currently    Drug use: Never    Sexual activity: Yes     Partners: Female   Other Topics Concern    Not on file   Social History Narrative    Not on file     Social Determinants of Health     Financial Resource Strain: Low Risk     Difficulty of Paying Living Expenses: Not hard at all   Food Insecurity: No Food Insecurity    Worried About [address] in the Last Year: Never true    [facility] in the Last Year: Never true   Transportation Needs: No Transportation Needs    Lack of Transportation (Medical): No    Lack of Transportation (Non-Medical):  No   Physical Activity: Not on file   Stress: Not on file   Social Connections: Not on file   Intimate Partner Violence: Not on file   Housing Stability: Not on file       No Known Allergies    Current Outpatient Medications   Medication Sig Dispense Refill    losartan (COZAAR) 25 MG tablet Take 1 tablet by mouth daily 90 tablet 0    buPROPion (WELLBUTRIN SR) 150 MG extended release tablet Take 1 tablet by mouth 2 times daily 180 tablet 0    sildenafil (VIAGRA) 50 MG tablet Take 1 tablet by mouth as needed for Erectile Dysfunction 20 min prior to sexual activity,do not use NITRO PILLS,monitor you BP 30 tablet 2    atorvastatin (LIPITOR) 20 MG tablet Take 1 tablet by mouth daily 90 tablet 0    apixaban (ELIQUIS) 2.5 MG TABS tablet Take 1 tablet by mouth 2 times daily 60 tablet 5    clopidogrel (PLAVIX) 75 MG tablet Take 1 tablet by mouth daily 30 tablet 3    Multiple Vitamin (MULTIVITAMIN ADULT PO) Take 1 tablet by mouth daily      aspirin 81 MG EC tablet Take 81 mg by mouth daily       No current facility-administered medications for this visit. Review of Systems   Constitutional: Negative. HENT: Negative. Eyes: Negative. Respiratory: Negative. Gastrointestinal: Negative. Endocrine: Negative. Genitourinary: Negative. Musculoskeletal: Negative. Skin: Negative. Allergic/Immunologic: Negative. Neurological: Negative. Hematological: Negative. Psychiatric/Behavioral: Negative. Objective:    /71 (Site: Left Upper Arm, Position: Sitting, Cuff Size: Large Adult)   Pulse 80   Temp 98.4 °F (36.9 °C) (Esophageal)   Ht 5' 8\" (1.727 m)   Wt 192 lb (87.1 kg)   SpO2 97%   BMI 29.19 kg/m²     Physical Exam  Constitutional:       Appearance: Normal appearance. HENT:      Head: Normocephalic and atraumatic.       Right Ear: External ear normal.      Left Ear: External ear normal.      Nose: Nose normal.      Mouth/Throat:      Mouth: Mucous membranes are moist.      Pharynx: Oropharynx is clear.   Eyes:      Extraocular Movements: Extraocular movements intact. Conjunctiva/sclera: Conjunctivae normal.      Pupils: Pupils are equal, round, and reactive to light. Cardiovascular:      Rate and Rhythm: Normal rate and regular rhythm. Pulmonary:      Effort: Pulmonary effort is normal.      Breath sounds: Normal breath sounds. Abdominal:      General: Bowel sounds are normal.      Palpations: Abdomen is soft. Musculoskeletal:         General: Normal range of motion. Cervical back: Normal range of motion and neck supple. Skin:     General: Skin is warm. Neurological:      General: No focal deficit present. Mental Status: He is alert and oriented to person, place, and time. Psychiatric:         Mood and Affect: Mood normal.         Behavior: Behavior normal.         Thought Content: Thought content normal.         Judgment: Judgment normal.          ASSESSMENT/PLAN:    1. Primary hypertension  Overview:  Stable with med  Refilled  Labs   Annual eye exam recommended  F/u in 3 months    Orders:  -     losartan (COZAAR) 25 MG tablet; Take 1 tablet by mouth daily, Disp-90 tablet, R-0Normal  2. Immunization due  -     Influenza, FLUCELVAX, (age 10 mo+), IM, PF, 0.5 mL  3. Tobacco use  Comments:  1 ppd for 45 plus years  advised to quit  Overview:  1 ppd for 45 plus years  advised to quit    Orders:  -     buPROPion (WELLBUTRIN SR) 150 MG extended release tablet; Take 1 tablet by mouth 2 times daily, Disp-180 tablet, R-0Normal  -     [address]  4. Atherosclerosis of native artery of extremity with intermittent claudication, unspecified extremity (HCC)  Overview:  Right lower fodzpwnzu-evsbxgulu-gzliny angioplasty and stents in 2022  Orders:  -     atorvastatin (LIPITOR) 20 MG tablet; Take 1 tablet by mouth daily, Disp-90 tablet, R-0Normal  -     [address]  5.  Acute deep vein thrombosis (DVT) of right lower extremity, unspecified vein [facility])  Comments:  on eliquis  Overview:  on eliquis    6. Elevated blood sugar  Comments:  low carb diet  check a1c  Overview:  low carb diet  check a1c    Orders:  -     Hemoglobin A1C; Future  7. Abnormal EKG  Comments:  cardiology referral  avoid amoking  on plavix/aspirin  Overview:  cardiology referral  avoid amoking  on plavix/aspirin    Orders:  -     [address]  8. Hyperlipidemia, unspecified hyperlipidemia type  Comments:  statin  Overview:  statin    Orders:  -     atorvastatin (LIPITOR) 20 MG tablet; Take 1 tablet by mouth daily, Disp-90 tablet, R-0Normal  9. Erectile dysfunction, unspecified erectile dysfunction type  Comments:  viagra as needed  Overview:  viagra as needed    Orders:  -     sildenafil (VIAGRA) 50 MG tablet; Take 1 tablet by mouth as needed for Erectile Dysfunction 20 min prior to sexual activity,do not use NITRO PILLS,monitor you BP, Disp-30 tablet, R-2Normal  10. Encounter for smoking cessation counseling  Comments:  try welbutrin  Overview:  try welbutrin    Orders:  -     buPROPion (WELLBUTRIN SR) 150 MG extended release tablet; Take 1 tablet by mouth 2 times daily, Disp-180 tablet, R-0Normal  11. Screening for prostate cancer  Comments:  psa    Overview:  psa      Orders:  -     PSA Screening; Future  12. PAD (peripheral artery disease) ([facility] [address]  Comments:  lower extremities-more on right  sees vascular MD  Overview:  Right lower ocoziozgh-vzgbkpdvx-gnorgj angioplasty and stents in 2022  13. Right popliteal artery occlusion (HCC)  Overview:  S/p angioplasty  14. Other sleep apnea  Overview: On cpap  15. Obstructive sleep apnea syndrome  Overview:   On cpap         Orders Placed This Encounter   Procedures    Influenza, FLUCELVAX, (age 10 mo+), IM, PF, 0.5 mL    PSA Screening     Standing Status:   Future     Standing Expiration Date:   11/9/2023    Hemoglobin A1C     Standing Status:   Future     Standing Expiration Date:   11/9/2023    [facility]     Referral Priority:   Routine     Referral Type:   Eval and Treat     Referral Reason:   Specialty Services Required     Requested Specialty:   Cardiology     Number of Visits Requested:   1        Orders Placed This Encounter   Medications    losartan (COZAAR) 25 MG tablet     Sig: Take 1 tablet by mouth daily     Dispense:  90 tablet     Refill:  0    buPROPion (WELLBUTRIN SR) 150 MG extended release tablet     Sig: Take 1 tablet by mouth 2 times daily     Dispense:  180 tablet     Refill:  0    sildenafil (VIAGRA) 50 MG tablet     Sig: Take 1 tablet by mouth as needed for Erectile Dysfunction 20 min prior to sexual activity,do not use NITRO PILLS,monitor you BP     Dispense:  30 tablet     Refill:  2    atorvastatin (LIPITOR) 20 MG tablet     Sig: Take 1 tablet by mouth daily     Dispense:  90 tablet     Refill:  0          No results found for any visits on 11/09/22. Lab Results   Component Value Date     10/13/2022    K 3.6 10/13/2022     10/13/2022    CO2 23 10/13/2022    BUN 11 10/13/2022    CREATININE 0.70 (L) 10/13/2022    GLUCOSE 133 (H) 10/13/2022    CALCIUM 9.3 10/13/2022    PROT 7.6 10/13/2022    LABALBU 3.5 10/13/2022    BILITOT 0.7 10/13/2022    ALKPHOS 77 10/13/2022    AST 10 (L) 10/13/2022    ALT 15 10/13/2022    LABGLOM >60 10/13/2022    GFRAA >60 10/23/2019    GLOB 4.1 10/13/2022     Lab Results   Component Value Date    WBC 11.5 (H) 10/13/2022    HGB 14.5 10/13/2022    HCT 42.7 10/13/2022    MCV 87.9 10/13/2022     10/13/2022     No results found for: LABA1C  No results found for: EAG  No results found for: CHOL  No results found for: TRIG  No results found for: HDL  No results found for: LDLCHOLESTEROL, LDLCALC  No results found for: LABVLDL, VLDL  No results found for: CHOLHDLRATIO        We discussed the expected course, resolution and complications of the diagnosis(es) in detail.   Medication risks, benefits, costs, interactions, and alternatives were discussed as indicated. I advised her to contact the office if her condition worsens, changes or fails to improve as anticipated. She expressed understanding with the diagnosis(es) and plan. Return in about 3 months (around 2/9/2023).      Margie Car MD

## 2022-11-29 RX ORDER — CLOPIDOGREL BISULFATE 75 MG/1
75 TABLET ORAL DAILY
Qty: 30 TABLET | Refills: 3 | Status: SHIPPED | OUTPATIENT
Start: 2022-11-29 | End: 2022-12-29

## 2022-12-15 PROBLEM — Z12.5 SCREENING FOR PROSTATE CANCER: Status: RESOLVED | Noted: 2022-11-15 | Resolved: 2022-12-15

## 2022-12-22 ENCOUNTER — INITIAL CONSULT (OUTPATIENT)
Dept: CARDIOLOGY CLINIC | Age: 62
End: 2022-12-22
Payer: COMMERCIAL

## 2022-12-22 VITALS
HEART RATE: 76 BPM | WEIGHT: 197 LBS | BODY MASS INDEX: 29.86 KG/M2 | HEIGHT: 68 IN | SYSTOLIC BLOOD PRESSURE: 164 MMHG | DIASTOLIC BLOOD PRESSURE: 90 MMHG

## 2022-12-22 DIAGNOSIS — I10 HYPERTENSION, UNSPECIFIED TYPE: ICD-10-CM

## 2022-12-22 DIAGNOSIS — E78.5 HYPERLIPIDEMIA, UNSPECIFIED HYPERLIPIDEMIA TYPE: ICD-10-CM

## 2022-12-22 DIAGNOSIS — I73.9 PAD (PERIPHERAL ARTERY DISEASE) (HCC): Primary | ICD-10-CM

## 2022-12-22 PROCEDURE — 99204 OFFICE O/P NEW MOD 45 MIN: CPT | Performed by: INTERNAL MEDICINE

## 2022-12-22 PROCEDURE — 3074F SYST BP LT 130 MM HG: CPT | Performed by: INTERNAL MEDICINE

## 2022-12-22 PROCEDURE — 3078F DIAST BP <80 MM HG: CPT | Performed by: INTERNAL MEDICINE

## 2022-12-22 NOTE — PROGRESS NOTES
[facility] CARDIOLOGY History & Physical                 Reason for Visit: Abnormal ECG    Subjective:     Patient is a 63 y.o. male with a PMH of PAD who presents as a referral for abnormal ECG. The patient had an ECG in August 2022 that was noted to demonstrate an incomplete right bundle branch block and sinus bradycardia. The patient reports that he switched PCPs, and his new PCP referred him for the abnormal ECG in August 2022. He was seen by vascular surgery in October 2022 for PAD. He was noted to be status post thrombolysis of the right popliteal artery stent. The patient had a TTE in April 2015 that was noted to demonstrate a low normal EF. The patient denies chest pain or dyspnea. He reports that when he pushes a lawnmower when he is doing lawn work, and he does not experience chest pain or dyspnea with this activity. The patient reports that he was on aspirin and Plavix before he had a lower extremity stent in August 2022. The patient subsequently had thrombolysis in October 2022 and was placed on low-dose Eliquis.     Past Medical History:   Diagnosis Date    Diverticulosis of colon (without mention of hemorrhage) 2015    no symptoms    Dysrhythmia, cardiac     heart palpatations on occasion    Hypercholesterolemia     managed with meds    GABE on CPAP     central and obstructive, CPAP 9 cm H2O does not wear cpap    Personal history of colonic polyps 2015    adenoma, TVA    PVD (peripheral vascular disease) with claudication ([address]     Snoring     Tobacco abuse       Past Surgical History:   Procedure Laterality Date    COLONOSCOPY  last 2/16/15    Uma--three trans TA, one spl flx TVA, rectal TVA--3 year recall    KNEE ARTHROSCOPY Left 1996    VASCULAR SURGERY Left 6-18-15    profundoplasty of profunda femoris artery    VASCULAR SURGERY Left 8/29/2022    RIGHT LOWER EXTREMITY ARTERIOGRAM WITH BALLOON ANGIOPLASTY AND STENT OF POPLITEAL ARTERY performed by Sarbjit Muñoz MD at [facility] VASCULAR SURGERY Right 10/12/2022    RIGHT LEG ARTERIOGRAM POSSIBLE THROMBOLYSIS performed by Surya Foley MD at [facility] MAIN OR    VASCULAR SURGERY Right 10/13/2022    ARTERIOGRAM RIGHT LEG LYSIS RECHECK performed by Surya Foley MD at [facility] MAIN OR      Family History   Problem Relation Age of Onset    Diabetes Brother     Stroke Maternal Grandmother     Diabetes Brother     Heart Disease Brother     Hypertension Father     Heart Disease Mother     Hypertension Sister     Heart Disease Father     Diabetes Father     Diabetes Sister       Social History     Tobacco Use    Smoking status: Every Day     Packs/day: 1.00     Years: 48.00     Pack years: 48.00     Types: Cigarettes     Start date: 1974    Smokeless tobacco: Never   Substance Use Topics    Alcohol use: Not Currently      No Known Allergies      ROS:  No obvious pertinent positives on review of systems except for what was outlined above.        Objective:       BP (!) 164/90   Pulse 76   Ht 5' 8\" (1.727 m)   Wt 197 lb (89.4 kg)   BMI 29.95 kg/m²     BP Readings from Last 3 Encounters:   12/22/22 (!) 164/90   11/09/22 126/71   10/28/22 (!) 159/91       Wt Readings from Last 3 Encounters:   12/22/22 197 lb (89.4 kg)   11/09/22 192 lb (87.1 kg)   10/28/22 194 lb (88 kg)       General/Constitutional:   Alert and oriented x 3, no acute distress  HEENT:   normocephalic, atraumatic, moist mucous membranes  Neck:   No JVD or carotid bruits bilaterally  Cardiovascular:   regular rate and rhythm, no rub/gallop appreciated  Pulmonary:   clear to auscultation bilaterally, no respiratory distress  Abdomen:   soft, non-tender, non-distended  Ext:   No sig LE edema bilaterally  Skin:  warm and dry, no obvious rashes seen  Neuro:   no obvious sensory or motor deficits  Psychiatric:   normal mood and affect    Data Review:   No results found for: CHOL  No results found for: TRIG  No results found for: HDL  No results found for: LDLCHOLESTEROL, LDLCALC  No results found for: LABVLDL, VLDL  No results found for: [facility]     Lab Results   Component Value Date/Time     10/13/2022 10:05 PM     10/13/2022 05:32 AM     10/12/2022 05:32 PM    K 3.6 10/13/2022 10:05 PM    K 3.3 10/13/2022 05:32 AM    K 3.8 10/12/2022 05:32 PM     10/13/2022 10:05 PM     10/13/2022 05:32 AM     10/12/2022 05:32 PM    CO2 23 10/13/2022 10:05 PM    CO2 26 10/13/2022 05:32 AM    CO2 25 10/12/2022 05:32 PM    BUN 11 10/13/2022 10:05 PM    BUN 8 10/13/2022 05:32 AM    BUN 9 10/12/2022 05:32 PM    CREATININE 0.70 10/13/2022 10:05 PM    CREATININE 0.50 10/13/2022 05:32 AM    CREATININE 0.60 10/12/2022 05:32 PM    GLUCOSE 133 10/13/2022 10:05 PM    GLUCOSE 106 10/13/2022 05:32 AM    GLUCOSE 127 10/12/2022 05:32 PM    CALCIUM 9.3 10/13/2022 10:05 PM    CALCIUM 8.6 10/13/2022 05:32 AM    CALCIUM 9.0 10/12/2022 05:32 PM         Lab Results   Component Value Date    ALT 15 10/13/2022    ALT 19 10/11/2022    AST 10 (L) 10/13/2022    AST 13 (L) 10/11/2022        Assessment/Plan:   1. PAD (peripheral artery disease) ([facility] [address]  - Vascular surgery note reviewed  - Patient on triple antithrombotic therapy albeit with low dose Eliquis included  - Sent a message to [employer] to discuss de-escalation of antithrombotic therapy in the setting of concerns regarding bleeding risk  - Currently on low-dose Eliquis, Plavix, and baby aspirin daily    2. Hypertension, unspecified type  - PCP note reviewed  - Currently on losartan    3.  Hyperlipidemia, unspecified hyperlipidemia type  - Continue with Lipitor    F/U: 6 months    Chepe Baker MD

## 2023-01-05 ENCOUNTER — TELEPHONE (OUTPATIENT)
Dept: CARDIOLOGY CLINIC | Age: 63
End: 2023-01-05

## 2023-01-05 NOTE — TELEPHONE ENCOUNTER
----- Message from Brian Amanda MD sent at 1/4/2023  7:19 PM EST -----  He certainly doesn't have a cardiac indication for DAPT.  Triage, please let the patient know that the patient's case was discussed with vascular surgery who notes it is acceptable, from their perspective, to stop Plavix in one month in order to mitigate bleeding risk.   ----- Message -----  From: CECIL Herman - SHAREE  Sent: 1/4/2023   8:20 AM EST  To: Brian Amanda MD    He can stop the Plavix in 1 month from a vascular standpoint, also if that's ok with you. He will need to be on ASA and Eliquis  ----- Message -----  From: Brian Amanda MD  Sent: 12/27/2022  11:14 AM EST  To: CECIL Herman - CNP    He is also on Plavix though.  Is there an end in site in terms of stopping either aspirin or Plavix, so he is on single antiplatelet therapy?  ----- Message -----  From: CECIL Herman - SHAREE  Sent: 12/27/2022  11:08 AM EST  To: Brian Amanda MD    Hi Dr. Amanda,    I reviewed the chart on Mr. Charles, with him having the popliteal artery stent occlude and require thrombolysis Dr. Echevarria would like him to be on lifelong ASA and Eliquis to prevent stent occlusion in the future. Please let me know if I can be of any further assistance.    Thanks,  Yeni Clarke NP  ----- Message -----  From: Brian Amanda MD  Sent: 12/22/2022  11:48 AM EST  To: Yeni Clarke APRN - CNP    Yeni    We see a mutual patient.  The patient reports another provider was concerned with the bleeding risk on three antithrombotic agents.  I know he is on low dose Eliquis; however, I too am concerned regarding the bleeding risk.. I understand he has had PAD events with stenting and thrombolysis.  However, I was wondering if his antithrombotic therapy can be de-escalated at some point?    Best,  Brian Amanda MD

## 2023-02-13 ENCOUNTER — OFFICE VISIT (OUTPATIENT)
Dept: PRIMARY CARE CLINIC | Facility: CLINIC | Age: 63
End: 2023-02-13
Payer: COMMERCIAL

## 2023-02-13 VITALS
DIASTOLIC BLOOD PRESSURE: 79 MMHG | WEIGHT: 197 LBS | SYSTOLIC BLOOD PRESSURE: 156 MMHG | OXYGEN SATURATION: 99 % | BODY MASS INDEX: 29.86 KG/M2 | HEART RATE: 74 BPM | TEMPERATURE: 98.2 F | HEIGHT: 68 IN

## 2023-02-13 DIAGNOSIS — Z12.11 SCREENING FOR COLON CANCER: ICD-10-CM

## 2023-02-13 DIAGNOSIS — N52.9 ERECTILE DYSFUNCTION, UNSPECIFIED ERECTILE DYSFUNCTION TYPE: ICD-10-CM

## 2023-02-13 DIAGNOSIS — E78.5 HYPERLIPIDEMIA, UNSPECIFIED HYPERLIPIDEMIA TYPE: ICD-10-CM

## 2023-02-13 DIAGNOSIS — Z72.0 TOBACCO USE: ICD-10-CM

## 2023-02-13 DIAGNOSIS — I10 PRIMARY HYPERTENSION: Primary | ICD-10-CM

## 2023-02-13 DIAGNOSIS — I73.9 PAD (PERIPHERAL ARTERY DISEASE) (HCC): ICD-10-CM

## 2023-02-13 DIAGNOSIS — Z12.2 SCREENING FOR LUNG CANCER: ICD-10-CM

## 2023-02-13 DIAGNOSIS — I70.219 ATHEROSCLEROSIS OF NATIVE ARTERY OF EXTREMITY WITH INTERMITTENT CLAUDICATION, UNSPECIFIED EXTREMITY (HCC): ICD-10-CM

## 2023-02-13 DIAGNOSIS — G47.33 OBSTRUCTIVE SLEEP APNEA SYNDROME: ICD-10-CM

## 2023-02-13 DIAGNOSIS — Z12.5 SCREENING FOR PROSTATE CANCER: ICD-10-CM

## 2023-02-13 DIAGNOSIS — I82.401 ACUTE DEEP VEIN THROMBOSIS (DVT) OF RIGHT LOWER EXTREMITY, UNSPECIFIED VEIN (HCC): ICD-10-CM

## 2023-02-13 DIAGNOSIS — R73.9 ELEVATED BLOOD SUGAR: ICD-10-CM

## 2023-02-13 PROCEDURE — 3077F SYST BP >= 140 MM HG: CPT | Performed by: FAMILY MEDICINE

## 2023-02-13 PROCEDURE — 3078F DIAST BP <80 MM HG: CPT | Performed by: FAMILY MEDICINE

## 2023-02-13 PROCEDURE — 99214 OFFICE O/P EST MOD 30 MIN: CPT | Performed by: FAMILY MEDICINE

## 2023-02-13 RX ORDER — ATORVASTATIN CALCIUM 20 MG/1
20 TABLET, FILM COATED ORAL DAILY
Qty: 90 TABLET | Refills: 0 | Status: SHIPPED | OUTPATIENT
Start: 2023-02-13

## 2023-02-13 RX ORDER — LOSARTAN POTASSIUM 25 MG/1
25 TABLET ORAL DAILY
Qty: 90 TABLET | Refills: 0 | Status: SHIPPED | OUTPATIENT
Start: 2023-02-13

## 2023-02-13 RX ORDER — SILDENAFIL 50 MG/1
50 TABLET, FILM COATED ORAL PRN
Qty: 30 TABLET | Refills: 2 | Status: SHIPPED | OUTPATIENT
Start: 2023-02-13

## 2023-02-13 SDOH — ECONOMIC STABILITY: HOUSING INSECURITY
IN THE LAST 12 MONTHS, WAS THERE A TIME WHEN YOU DID NOT HAVE A STEADY PLACE TO SLEEP OR SLEPT IN A SHELTER (INCLUDING NOW)?: NO

## 2023-02-13 SDOH — ECONOMIC STABILITY: HOUSING INSECURITY: IN THE LAST 12 MONTHS, HOW MANY PLACES HAVE YOU LIVED?: 1

## 2023-02-13 SDOH — ECONOMIC STABILITY: INCOME INSECURITY: HOW HARD IS IT FOR YOU TO PAY FOR THE VERY BASICS LIKE FOOD, HOUSING, MEDICAL CARE, AND HEATING?: PATIENT DECLINED

## 2023-02-13 SDOH — HEALTH STABILITY: PHYSICAL HEALTH: ON AVERAGE, HOW MANY MINUTES DO YOU ENGAGE IN EXERCISE AT THIS LEVEL?: 0 MIN

## 2023-02-13 SDOH — HEALTH STABILITY: PHYSICAL HEALTH: ON AVERAGE, HOW MANY DAYS PER WEEK DO YOU ENGAGE IN MODERATE TO STRENUOUS EXERCISE (LIKE A BRISK WALK)?: 0 DAYS

## 2023-02-13 SDOH — ECONOMIC STABILITY: FOOD INSECURITY: WITHIN THE PAST 12 MONTHS, THE FOOD YOU BOUGHT JUST DIDN'T LAST AND YOU DIDN'T HAVE MONEY TO GET MORE.: PATIENT DECLINED

## 2023-02-13 SDOH — ECONOMIC STABILITY: FOOD INSECURITY: WITHIN THE PAST 12 MONTHS, YOU WORRIED THAT YOUR FOOD WOULD RUN OUT BEFORE YOU GOT MONEY TO BUY MORE.: PATIENT DECLINED

## 2023-02-13 SDOH — ECONOMIC STABILITY: INCOME INSECURITY: IN THE LAST 12 MONTHS, WAS THERE A TIME WHEN YOU WERE NOT ABLE TO PAY THE MORTGAGE OR RENT ON TIME?: NO

## 2023-02-13 ASSESSMENT — PATIENT HEALTH QUESTIONNAIRE - PHQ9
1. LITTLE INTEREST OR PLEASURE IN DOING THINGS: 0
SUM OF ALL RESPONSES TO PHQ QUESTIONS 1-9: 0
SUM OF ALL RESPONSES TO PHQ9 QUESTIONS 1 & 2: 0
2. FEELING DOWN, DEPRESSED OR HOPELESS: 0

## 2023-02-13 ASSESSMENT — ENCOUNTER SYMPTOMS
GASTROINTESTINAL NEGATIVE: 1
EYES NEGATIVE: 1
RESPIRATORY NEGATIVE: 1
ALLERGIC/IMMUNOLOGIC NEGATIVE: 1

## 2023-02-13 ASSESSMENT — SOCIAL DETERMINANTS OF HEALTH (SDOH)
WITHIN THE LAST YEAR, HAVE YOU BEEN KICKED, HIT, SLAPPED, OR OTHERWISE PHYSICALLY HURT BY YOUR PARTNER OR EX-PARTNER?: NO
WITHIN THE LAST YEAR, HAVE TO BEEN RAPED OR FORCED TO HAVE ANY KIND OF SEXUAL ACTIVITY BY YOUR PARTNER OR EX-PARTNER?: NO
DO YOU BELONG TO ANY CLUBS OR ORGANIZATIONS SUCH AS CHURCH GROUPS UNIONS, FRATERNAL OR ATHLETIC GROUPS, OR SCHOOL GROUPS?: NO
HOW OFTEN DO YOU ATTEND CHURCH OR RELIGIOUS SERVICES?: NEVER
WITHIN THE LAST YEAR, HAVE YOU BEEN AFRAID OF YOUR PARTNER OR EX-PARTNER?: NO
HOW OFTEN DO YOU GET TOGETHER WITH FRIENDS OR RELATIVES?: MORE THAN THREE TIMES A WEEK
IN A TYPICAL WEEK, HOW MANY TIMES DO YOU TALK ON THE PHONE WITH FAMILY, FRIENDS, OR NEIGHBORS?: MORE THAN THREE TIMES A WEEK
WITHIN THE LAST YEAR, HAVE YOU BEEN HUMILIATED OR EMOTIONALLY ABUSED IN OTHER WAYS BY YOUR PARTNER OR EX-PARTNER?: NO
HOW OFTEN DO YOU ATTENT MEETINGS OF THE CLUB OR ORGANIZATION YOU BELONG TO?: NEVER

## 2023-02-13 NOTE — PROGRESS NOTES
[address] [facility], [facility] [address]  Office : [phone]  Fax : [phone]      Subjective: The patient is a 63 y.o. male  who presents for f/u on multiple chronic medical conditions-good compliance with medications--pt here to get routeine labs and need refill on meds. no cardiopulmonary symptoms  Severe PAD-sees vascular MD-lower extremities-s/p bypass/stents-on aspirin and blood thinner  Hypertension--Pt BP been controlled with meds  Prediabetes -pts blood sugar stable on diet-NEED A1C CHECK  Hyperlipidemia--pts on low carb diet and low fat diet -stable on med usually-not beeen on med in 2023  Diverticulosis of colon (without mention of hemorrhage) 2015   no symptoms    Dysrhythmia, cardiac   heart palpatations on occasion    GABE on CPAP   central and obstructive, CPAP 9 cm H2O does not wear cpap    Personal history of colonic polyps 2015   adenoma, TVA    PVD (peripheral vascular disease) with claudication (HCC)    Snoring    Tobacco abuse -CT chest neg in 2021    Patient Active Problem List   Diagnosis    Hypercholesterolemia    Snoring    Atherosclerosis of native artery of extremity with intermittent claudication (HCC)    Sleep apnea    Peripheral artery disease (HCC)    Dysrhythmia, cardiac    Popliteal artery aneurysm, bilateral (HCC)    Popliteal artery occlusion, left (HCC)    Non-recurrent bilateral inguinal hernia without obstruction or gangrene    Right popliteal artery occlusion (HCC)    Acute deep vein thrombosis (DVT) of right lower extremity (HCC)    Elevated blood sugar    Abnormal EKG    Hyperlipidemia    Erectile dysfunction    Encounter for smoking cessation counseling    Tobacco use    Immunization due    Hypertension    PAD (peripheral artery disease) ([facility] [address]       Past Medical History:   Diagnosis Date    Diverticulosis of colon (without mention of hemorrhage) 2015    no symptoms    Dysrhythmia, cardiac     heart palpatations on occasion Hypercholesterolemia     managed with meds    GABE on CPAP     central and obstructive, CPAP 9 cm H2O does not wear cpap    Personal history of colonic polyps 2015    adenoma, TVA    PVD (peripheral vascular disease) with claudication (HCC)     Snoring     Tobacco abuse        Past Surgical History:   Procedure Laterality Date    COLONOSCOPY  last 2/16/15    Uma--three trans TA, one spl flx TVA, rectal TVA--3 year recall    KNEE ARTHROSCOPY Left 1996    VASCULAR SURGERY Left 6-18-15    profundoplasty of profunda femoris artery    VASCULAR SURGERY Left 8/29/2022    RIGHT LOWER EXTREMITY ARTERIOGRAM WITH BALLOON ANGIOPLASTY AND STENT OF POPLITEAL ARTERY performed by Howie Saba MD at [facility] MAIN OR    VASCULAR SURGERY Right 10/12/2022    RIGHT LEG ARTERIOGRAM POSSIBLE THROMBOLYSIS performed by Howie Saba MD at [facility] MAIN OR    VASCULAR SURGERY Right 10/13/2022    ARTERIOGRAM RIGHT LEG LYSIS RECHECK performed by Howie Saba MD at [facility] MAIN OR       Social History     Socioeconomic History    Marital status:      Spouse name: Not on file    Number of children: Not on file    Years of education: Not on file    Highest education level: Not on file   Occupational History    Not on file   Tobacco Use    Smoking status: Every Day     Packs/day: 1.00     Years: 48.00     Pack years: 48.00     Types: Cigarettes     Start date: 1974    Smokeless tobacco: Never   Substance and Sexual Activity    Alcohol use: Not Currently    Drug use: Never    Sexual activity: Yes     Partners: Female   Other Topics Concern    Not on file   Social History Narrative    Not on file     Social Determinants of Health     Financial Resource Strain: Unknown    Difficulty of Paying Living Expenses: Patient refused   Food Insecurity: Unknown    Worried About Running Out of Food in the Last Year: Patient refused    [address] in the Last Year: Patient refused   Transportation Needs: No Transportation Needs    Lack of Transportation (Medical): No    Lack of Transportation (Non-Medical): No   Physical Activity: Inactive    Days of Exercise per Week: 0 days    Minutes of Exercise per Session: 0 min   Stress: No Stress Concern Present    Feeling of Stress : Not at all   Social Connections: Moderately Isolated    Frequency of Communication with Friends and Family: More than three times a week    Frequency of Social Gatherings with Friends and Family: More than three times a week    Attends Sikh Services: Never    Active Member of Clubs or Organizations: No    Attends Club or Organization Meetings: Never    Marital Status:    Intimate Partner Violence: Not At Risk    Fear of Current or Ex-Partner: No    Emotionally Abused: No    Physically Abused: No    Sexually Abused: No   Housing Stability: Low Risk     Unable to Pay for Housing in the Last Year: No    Number of [place] in the Last Year: 1    Unstable Housing in the Last Year: No       No Known Allergies    Current Outpatient Medications   Medication Sig Dispense Refill    atorvastatin (LIPITOR) 20 MG tablet Take 1 tablet by mouth daily 90 tablet 0    losartan (COZAAR) 25 MG tablet Take 1 tablet by mouth daily 90 tablet 0    sildenafil (VIAGRA) 50 MG tablet Take 1 tablet by mouth as needed for Erectile Dysfunction 20 min prior to sexual activity,do not use NITRO PILLS,monitor you BP 30 tablet 2    apixaban (ELIQUIS) 2.5 MG TABS tablet Take 1 tablet by mouth 2 times daily 60 tablet 5    clopidogrel (PLAVIX) 75 MG tablet Take 1 tablet by mouth daily 30 tablet 3    Multiple Vitamin (MULTIVITAMIN ADULT PO) Take 1 tablet by mouth daily      aspirin 81 MG EC tablet Take 81 mg by mouth daily      buPROPion (WELLBUTRIN SR) 150 MG extended release tablet Take 1 tablet by mouth 2 times daily (Patient not taking: No sig reported) 180 tablet 0     No current facility-administered medications for this visit. Review of Systems   Constitutional: Negative.     HENT: Negative. Eyes: Negative. Respiratory: Negative. Gastrointestinal: Negative. Endocrine: Negative. Genitourinary: Negative. Musculoskeletal: Negative. Skin: Negative. Allergic/Immunologic: Negative. Neurological: Negative. Hematological: Negative. Psychiatric/Behavioral: Negative. Objective:    BP (!) 156/79 (Site: Left Upper Arm, Position: Sitting, Cuff Size: Large Adult)   Pulse 74   Temp 98.2 °F (36.8 °C) (Temporal)   Ht 5' 8\" (1.727 m)   Wt 197 lb (89.4 kg)   SpO2 99%   BMI 29.95 kg/m²     Physical Exam  Constitutional:       Appearance: Normal appearance. HENT:      Head: Normocephalic and atraumatic. Right Ear: External ear normal.      Left Ear: External ear normal.      Nose: Nose normal.      Mouth/Throat:      Mouth: Mucous membranes are moist.      Pharynx: Oropharynx is clear. Eyes:      Extraocular Movements: Extraocular movements intact. Conjunctiva/sclera: Conjunctivae normal.      Pupils: Pupils are equal, round, and reactive to light. Cardiovascular:      Rate and Rhythm: Normal rate and regular rhythm. Pulmonary:      Effort: Pulmonary effort is normal.      Breath sounds: Normal breath sounds. Abdominal:      General: Bowel sounds are normal.      Palpations: Abdomen is soft. Musculoskeletal:         General: Normal range of motion. Cervical back: Normal range of motion and neck supple. Skin:     General: Skin is warm. Neurological:      General: No focal deficit present. Mental Status: He is alert and oriented to person, place, and time. Psychiatric:         Mood and Affect: Mood normal.         Behavior: Behavior normal.         Thought Content: Thought content normal.         Judgment: Judgment normal.          ASSESSMENT/PLAN:    1. Primary hypertension  Overview:  Stable with med  Refilled  Labs   Annual eye exam recommended  F/u in 3 months    Orders:  -     losartan (COZAAR) 25 MG tablet;  Take 1 tablet by mouth daily, Disp-90 tablet, R-0Normal  -     Comprehensive Metabolic Panel; Future  -     CBC with Auto Differential; Future  -     Hemoglobin A1C; Future  -     Lipid Panel; Future  2. Hyperlipidemia, unspecified hyperlipidemia type  Overview:  statin    Orders:  -     atorvastatin (LIPITOR) 20 MG tablet; Take 1 tablet by mouth daily, Disp-90 tablet, R-0Normal  -     Comprehensive Metabolic Panel; Future  -     CBC with Auto Differential; Future  -     Hemoglobin A1C; Future  -     Lipid Panel; Future  3. Acute deep vein thrombosis (DVT) of right lower extremity, unspecified vein (HCC)  Overview:  on eliquis since 11/2022-recommended to be on it life time  Vascular md  Orders:  -     Comprehensive Metabolic Panel; Future  -     CBC with Auto Differential; Future  -     Hemoglobin A1C; Future  -     Lipid Panel; Future  4. Atherosclerosis of native artery of extremity with intermittent claudication, unspecified extremity (HCC)  Overview:  Right lower qpokbhryj-fuvppkiqm-bwsjlo angioplasty and stents in 2022  Orders:  -     atorvastatin (LIPITOR) 20 MG tablet; Take 1 tablet by mouth daily, Disp-90 tablet, R-0Normal  -     Comprehensive Metabolic Panel; Future  -     CBC with Auto Differential; Future  -     Hemoglobin A1C; Future  -     Lipid Panel; Future  5. Elevated blood sugar  Overview:  low carb diet  check a1c    Orders:  -     Comprehensive Metabolic Panel; Future  -     CBC with Auto Differential; Future  -     Hemoglobin A1C; Future  -     Lipid Panel; Future  6. Erectile dysfunction, unspecified erectile dysfunction type  Overview:  viagra as needed    Orders:  -     sildenafil (VIAGRA) 50 MG tablet; Take 1 tablet by mouth as needed for Erectile Dysfunction 20 min prior to sexual activity,do not use NITRO PILLS,monitor you BP, Disp-30 tablet, R-2Normal  -     Comprehensive Metabolic Panel; Future  -     CBC with Auto Differential; Future  -     Hemoglobin A1C; Future  -     Lipid Panel; Future  7.  PAD (peripheral artery disease) (HCC)  Overview:  Right lower xhvajqnkm-wgshqeoqw-csprha angioplasty and stents in 2022  Eliquis/aspirin recommended by vascular MD for life time  Orders:  -     Comprehensive Metabolic Panel; Future  -     CBC with Auto Differential; Future  -     Hemoglobin A1C; Future  -     Lipid Panel; Future  8. Hyperlipidemia, unspecified hyperlipidemia type  Comments:  statin  Overview:  statin    Orders:  -     atorvastatin (LIPITOR) 20 MG tablet; Take 1 tablet by mouth daily, Disp-90 tablet, R-0Normal  -     Comprehensive Metabolic Panel; Future  -     CBC with Auto Differential; Future  -     Hemoglobin A1C; Future  -     Lipid Panel; Future  9. Erectile dysfunction, unspecified erectile dysfunction type  Comments:  viagra as needed  Overview:  viagra as needed    Orders:  -     sildenafil (VIAGRA) 50 MG tablet; Take 1 tablet by mouth as needed for Erectile Dysfunction 20 min prior to sexual activity,do not use NITRO PILLS,monitor you BP, Disp-30 tablet, R-2Normal  -     Comprehensive Metabolic Panel; Future  -     CBC with Auto Differential; Future  -     Hemoglobin A1C; Future  -     Lipid Panel; Future  10. Obstructive sleep apnea syndrome  Overview: On cpap  Orders:  -     Comprehensive Metabolic Panel; Future  -     CBC with Auto Differential; Future  -     Hemoglobin A1C; Future  -     Lipid Panel; Future  11. Screening for prostate cancer  -     Comprehensive Metabolic Panel; Future  -     CBC with Auto Differential; Future  -     PSA Screening; Future  -     Hemoglobin A1C; Future  -     Lipid Panel; Future  12. Screening for lung cancer  -     Comprehensive Metabolic Panel; Future  -     CBC with Auto Differential; Future  -     Hemoglobin A1C; Future  -     Lipid Panel; Future  -     CT LCS FOLLOW UP CHEST WO CONTRAST; Future  13. Tobacco use  Overview:  1 ppd for 45 plus years  advised to quit    Orders:  -     Comprehensive Metabolic Panel;  Future  -     CBC with Auto Differential; Future  -     Hemoglobin A1C; Future  -     Lipid Panel; Future  -     CT LCS FOLLOW UP CHEST WO CONTRAST; Future  14. Screening for colon cancer  -     Ambulatory referral to Colonoscopy         Orders Placed This Encounter   Procedures    CT LCS FOLLOW UP CHEST WO CONTRAST     Standing Status:   Future     Standing Expiration Date:   2/13/2024    Comprehensive Metabolic Panel     Standing Status:   Future     Standing Expiration Date:   2/13/2024    CBC with Auto Differential     Standing Status:   Future     Standing Expiration Date:   2/13/2024    PSA Screening     Standing Status:   Future     Standing Expiration Date:   2/13/2024    Hemoglobin A1C     Standing Status:   Future     Standing Expiration Date:   2/13/2024    Lipid Panel     Standing Status:   Future     Standing Expiration Date:   2/13/2024     Order Specific Question:   Is Patient Fasting?/# of Hours     Answer:   0    Ambulatory referral to Colonoscopy     Referral Priority:   Routine     Referral Type:   Eval and Treat     Referral Reason:   Specialty Services Required     Number of Visits Requested:   1        Orders Placed This Encounter   Medications    atorvastatin (LIPITOR) 20 MG tablet     Sig: Take 1 tablet by mouth daily     Dispense:  90 tablet     Refill:  0    losartan (COZAAR) 25 MG tablet     Sig: Take 1 tablet by mouth daily     Dispense:  90 tablet     Refill:  0    sildenafil (VIAGRA) 50 MG tablet     Sig: Take 1 tablet by mouth as needed for Erectile Dysfunction 20 min prior to sexual activity,do not use NITRO PILLS,monitor you BP     Dispense:  30 tablet     Refill:  2          No results found for any visits on 02/13/23.    Lab Results   Component Value Date     10/13/2022    K 3.6 10/13/2022     10/13/2022    CO2 23 10/13/2022    BUN 11 10/13/2022    CREATININE 0.70 (L) 10/13/2022    GLUCOSE 133 (H) 10/13/2022    CALCIUM 9.3 10/13/2022    PROT 7.6 10/13/2022    LABALBU 3.5 10/13/2022    BILITOT 0.7 10/13/2022 ALKPHOS 77 10/13/2022    AST 10 (L) 10/13/2022    ALT 15 10/13/2022    LABGLOM >60 10/13/2022    GFRAA >60 10/23/2019    GLOB 4.1 10/13/2022     Lab Results   Component Value Date    WBC 11.5 (H) 10/13/2022    HGB 14.5 10/13/2022    HCT 42.7 10/13/2022    MCV 87.9 10/13/2022     10/13/2022     No results found for: LABA1C  No results found for: EAG  No results found for: CHOL  No results found for: TRIG  No results found for: HDL  No results found for: LDLCHOLESTEROL, LDLCALC  No results found for: LABVLDL, VLDL  No results found for: CHOLHDLRATIO    US Result (most recent):  US SCROTUM AND TESTICLES   CT Result (most recent):  CTA ABDOMINAL AORTA AND BILATERAL ILIOFEMORAL RUNOFF W WO CONTRAST 10/14/2022    Narrative  Title:  CT arteriogram of the abdomen, pelvis and lower extremities. Indication:  Left groin pain following recent arterial catheterization. Fever. Technique: Axial images of the abdomen, pelvis and both lower extremities were  obtained after the administration of intravenous iodinated contrast media. Contrast was used to best identify the arterial structures. Images were reviewed  on a separate, free standing, three-dimensional workstation as per the referring  physicians request.    All CT scans at this facility are performed using dose reduction/dose modulation  techniques, as appropriate the performed exam, including the following:  Automated Exposure Control; Adjustment of the mA and/or kV according to patient  size (this includes techniques or standardized protocols for targeted exams  where dose is matched to indication/reason for exam); and Use of Iterative  Reconstruction Technique. Comparison: CTA 7/28/2022. Findings:    Thoracic: Mild atelectatic changes in the right lower lobe. The visualized lung  bases are otherwise clear. Abdomen/pelvis: The spleen and pancreas are within normal limits. Adrenal glands  are diffusely thickened bilaterally without focal nodularity. No hydronephrosis  or renal calculi. No focal hepatic lesions are identified. No biliary ductal  dilation. The gallbladder appears is without thickening or pericholecystic  fluid. Possible small gallstone (series 301, image 278). No evidence of bowel  obstruction. Colonic diverticulosis without evidence of diverticulitis. Prostatomegaly. Negative for ascites. No peritoneal or retroperitoneal  adenopathy or mass lesions. The appendix is within normal limits. The portal  system appears to be patent. Small bilateral hydroceles with small calcific  densities present within the scrotum. Musculoskeletal:  No destructive lesion. Recent postsurgical changes to the left  groin. No drainable fluid collections. No evidence of pseudoaneurysm formation. Moderate multilevel degenerative changes in the lumbar spine. Vascular: Moderate generalized atherosclerotic changes. The aorta is not  aneurysmal. No significant aortic stenosis is identified. The celiac and  mesenteric arteries are patent without significant stenosis. The inferior  mesenteric artery appears be patent without stenosis. The renal arteries appear  to be patent without significant stenosis. .    Right lower extremity: The right common and external iliac arteries are patent  without significant stenosis. Moderate to high-grade stenosis at the origin of  the right internal iliac artery. The right common femoral artery is patent  without stenosis. Aneurysmal dilation of the proximal right profunda femoral  artery which measures approximately 2.3 x 2.0 cm in diameter. A moderate  stenosis is present at the right profunda femoral artery origin. The right SFA  is patent, with mild stenoses present in its distal aspect. Endovascular stents  are present spanning the distal right SFA to the popliteal artery. The  endovascular stents appear to be patent without significant stenosis.  Multifocal  stenoses involving the below-knee three-vessel runoff with probable occlusion of  the left posterior tibial artery. The peroneal artery appears to be patent. Multifocal stenoses involving the right anterior tibial artery throughout its  course. The aneurysm sac of the right L2 artery measures 2.6 x 2.1 cm in  diameter. There is a new low-density fluid collection surrounding the stents  above the knee with suggestion of rim enhancement. Inflammatory changes are also  present in the adjacent soft tissues as evidenced by fat stranding. These  findings are best seen on series 301, image 1245. Left lower extremity: The left common, and external iliac arteries are patent  without significant stenosis. Moderate to high-grade stenosis at the origin of  the left internal iliac artery. The left common femoral artery is patent without  significant stenosis. Negative for pseudoaneurysm. Aneurysmal dilation of the  proximal left profunda femoral artery. The aneurysm measures approximately 2.6 x  2.5 cm in diameter. Mild stenosis at the origin of the left SFA. The left SFA is  patent with multifocal mild stenoses throughout its course. There is occlusion  of the left SFA in its distal aspect which is unchanged. The left popliteal  artery is chronically occluded. Intermittent poor opacification of the  below-knee three-vessel runoff. Varicose veins are present in the bilateral lower extremities. .    Impression  1. RIGHT: No large vessel occlusion or high-grade stenosis identified. Patent  endovascular stent spanning the distal SFA to the popliteal artery. There is a  new thin fluid collection surrounding the stent with suggestion of rim  enhancement. Some inflammatory changes are also present in the adjacent soft  tissues. While imaging findings are not confirmatory, infection is in the  differential. Moderate to high-grade stenosis in the origin of the right  internal iliac artery. 2. LEFT: Chronic occlusion of the distal left SFA and left popliteal artery.   Negative for pseudoaneurysm at the access site. 3.  Varicose veins are present in the bilateral lower extremity. 4.  Possible cholelithiasis, ultrasound could further evaluate as clinically  indicated. 5.  Prostatomegaly. Findings were called to Dr. Brennan Knapp by Dr. Nish Corona on 10/14/2022 at 1148 hours  via telephone. Xray Result (most recent):  XR CHEST PORTABLE 10/14/2022    Narrative  EXAM: Chest x-ray. INDICATION: Fever. COMPARISON: None. TECHNIQUE: Single frontal view. FINDINGS: The lungs are clear. The cardiac size, mediastinal contour and  pulmonary vasculature are normal. No pneumothorax or pleural effusion is seen. The bones are intact. Impression  No acute process. We discussed the expected course, resolution and complications of the diagnosis(es) in detail. Medication risks, benefits, costs, interactions, and alternatives were discussed as indicated. I advised her to contact the office if her condition worsens, changes or fails to improve as anticipated. She expressed understanding with the diagnosis(es) and plan. Return in about 3 months (around 5/13/2023).      Sandee Quinonez MD

## 2023-02-24 ENCOUNTER — TELEPHONE (OUTPATIENT)
Dept: PRIMARY CARE CLINIC | Facility: CLINIC | Age: 63
End: 2023-02-24

## 2023-02-24 NOTE — TELEPHONE ENCOUNTER
----- Message from Mary Lux sent at 2/23/2023  2:06 PM EST -----  Subject: Message to Provider    QUESTIONS  Information for Provider? Milo Hortensia went to do the CT scan and was told all   of the paper work had not been done for this. There was to be a   questionnaire that he needed to fill out that was not done. Please call to   let him know how he will be able to get that done.   ---------------------------------------------------------------------------  --------------  [id number]  [phone]; OK to leave message on voicemail  ---------------------------------------------------------------------------  --------------  SCRIPT ANSWERS  Relationship to Patient?  Self

## 2023-03-07 ENCOUNTER — PATIENT MESSAGE (OUTPATIENT)
Dept: PRIMARY CARE CLINIC | Facility: CLINIC | Age: 63
End: 2023-03-07

## 2023-03-07 NOTE — TELEPHONE ENCOUNTER
Dr. Stephen Barnes did you answer the questionare when you placed the order in the chart for ct scan?

## 2023-03-08 NOTE — TELEPHONE ENCOUNTER
I called the radiology department,     The order is incorrect; the correct is CT Lung Screening ([id number])  I attached the order, please review and sign.

## 2023-03-24 ENCOUNTER — HOSPITAL ENCOUNTER (OUTPATIENT)
Dept: CT IMAGING | Age: 63
Discharge: HOME | End: 2023-03-24
Payer: COMMERCIAL

## 2023-03-24 DIAGNOSIS — Z87.891 PERSONAL HISTORY OF TOBACCO USE: ICD-10-CM

## 2023-03-24 PROCEDURE — 71271 CT THORAX LUNG CANCER SCR C-: CPT

## 2023-03-28 ENCOUNTER — PATIENT MESSAGE (OUTPATIENT)
Dept: PRIMARY CARE CLINIC | Facility: CLINIC | Age: 63
End: 2023-03-28

## 2023-03-28 NOTE — TELEPHONE ENCOUNTER
From: Cristela Schuler. To: Dr. Elza Moura  Sent: 3/28/2023 1:19 PM EDT  Subject: Test     That gastrointestinal isnt taken new patients.  Could you find another thanks Bob

## 2023-04-26 DIAGNOSIS — Z12.5 SCREENING FOR PROSTATE CANCER: ICD-10-CM

## 2023-04-26 DIAGNOSIS — N52.9 ERECTILE DYSFUNCTION, UNSPECIFIED ERECTILE DYSFUNCTION TYPE: ICD-10-CM

## 2023-04-26 DIAGNOSIS — G47.33 OBSTRUCTIVE SLEEP APNEA SYNDROME: ICD-10-CM

## 2023-04-26 DIAGNOSIS — E78.5 HYPERLIPIDEMIA, UNSPECIFIED HYPERLIPIDEMIA TYPE: ICD-10-CM

## 2023-04-26 DIAGNOSIS — I70.219 ATHEROSCLEROSIS OF NATIVE ARTERY OF EXTREMITY WITH INTERMITTENT CLAUDICATION, UNSPECIFIED EXTREMITY (HCC): ICD-10-CM

## 2023-04-26 DIAGNOSIS — I10 PRIMARY HYPERTENSION: ICD-10-CM

## 2023-04-26 DIAGNOSIS — R73.9 ELEVATED BLOOD SUGAR: ICD-10-CM

## 2023-04-26 DIAGNOSIS — I82.401 ACUTE DEEP VEIN THROMBOSIS (DVT) OF RIGHT LOWER EXTREMITY, UNSPECIFIED VEIN (HCC): ICD-10-CM

## 2023-04-26 DIAGNOSIS — I73.9 PAD (PERIPHERAL ARTERY DISEASE) (HCC): ICD-10-CM

## 2023-04-26 DIAGNOSIS — Z12.2 SCREENING FOR LUNG CANCER: ICD-10-CM

## 2023-04-26 DIAGNOSIS — Z72.0 TOBACCO USE: ICD-10-CM

## 2023-04-26 LAB
BASOPHILS # BLD: 0.1 K/UL (ref 0–0.2)
BASOPHILS NFR BLD: 1 % (ref 0–2)
DIFFERENTIAL METHOD BLD: NORMAL
EOSINOPHIL # BLD: 0.1 K/UL (ref 0–0.8)
EOSINOPHIL NFR BLD: 1 % (ref 0.5–7.8)
ERYTHROCYTE [DISTWIDTH] IN BLOOD BY AUTOMATED COUNT: 13.1 % (ref 11.9–14.6)
HCT VFR BLD AUTO: 48.2 % (ref 41.1–50.3)
HGB BLD-MCNC: 15.7 G/DL (ref 13.6–17.2)
IMM GRANULOCYTES # BLD AUTO: 0 K/UL (ref 0–0.5)
IMM GRANULOCYTES NFR BLD AUTO: 0 % (ref 0–5)
LYMPHOCYTES # BLD: 1.9 K/UL (ref 0.5–4.6)
LYMPHOCYTES NFR BLD: 26 % (ref 13–44)
MCH RBC QN AUTO: 30.3 PG (ref 26.1–32.9)
MCHC RBC AUTO-ENTMCNC: 32.6 G/DL (ref 31.4–35)
MCV RBC AUTO: 93.1 FL (ref 82–102)
MONOCYTES # BLD: 0.5 K/UL (ref 0.1–1.3)
MONOCYTES NFR BLD: 7 % (ref 4–12)
NEUTS SEG # BLD: 4.8 K/UL (ref 1.7–8.2)
NEUTS SEG NFR BLD: 65 % (ref 43–78)
NRBC # BLD: 0 K/UL (ref 0–0.2)
PLATELET # BLD AUTO: 190 K/UL (ref 150–450)
PMV BLD AUTO: 10.2 FL (ref 9.4–12.3)
RBC # BLD AUTO: 5.18 M/UL (ref 4.23–5.6)
WBC # BLD AUTO: 7.4 K/UL (ref 4.3–11.1)

## 2023-04-27 LAB
ALBUMIN SERPL-MCNC: 4 G/DL (ref 3.2–4.6)
ALBUMIN/GLOB SERPL: 1.1 (ref 0.4–1.6)
ALP SERPL-CCNC: 77 U/L (ref 50–136)
ALT SERPL-CCNC: 29 U/L (ref 12–65)
ANION GAP SERPL CALC-SCNC: NORMAL MMOL/L (ref 2–11)
AST SERPL-CCNC: 15 U/L (ref 15–37)
BILIRUB SERPL-MCNC: 0.6 MG/DL (ref 0.2–1.1)
BUN SERPL-MCNC: 14 MG/DL (ref 8–23)
CALCIUM SERPL-MCNC: 9.4 MG/DL (ref 8.3–10.4)
CHLORIDE SERPL-SCNC: 110 MMOL/L (ref 101–110)
CHOLEST SERPL-MCNC: 172 MG/DL
CO2 SERPL-SCNC: 31 MMOL/L (ref 21–32)
CREAT SERPL-MCNC: 0.8 MG/DL (ref 0.8–1.5)
EST. AVERAGE GLUCOSE BLD GHB EST-MCNC: 103 MG/DL
GLOBULIN SER CALC-MCNC: 3.5 G/DL (ref 2.8–4.5)
GLUCOSE SERPL-MCNC: 91 MG/DL (ref 65–100)
HBA1C MFR BLD: 5.2 % (ref 4.8–5.6)
HDLC SERPL-MCNC: 58 MG/DL (ref 40–60)
HDLC SERPL: 3
LDLC SERPL CALC-MCNC: 94 MG/DL
POTASSIUM SERPL-SCNC: 4.1 MMOL/L (ref 3.5–5.1)
PROT SERPL-MCNC: 7.5 G/DL (ref 6.3–8.2)
PSA SERPL-MCNC: 1.6 NG/ML
SODIUM SERPL-SCNC: 138 MMOL/L (ref 133–143)
TRIGL SERPL-MCNC: 100 MG/DL (ref 35–150)
VLDLC SERPL CALC-MCNC: 20 MG/DL (ref 6–23)

## 2023-05-08 ENCOUNTER — OFFICE VISIT (OUTPATIENT)
Dept: VASCULAR SURGERY | Age: 63
End: 2023-05-08
Payer: COMMERCIAL

## 2023-05-08 VITALS
DIASTOLIC BLOOD PRESSURE: 87 MMHG | HEART RATE: 70 BPM | OXYGEN SATURATION: 95 % | BODY MASS INDEX: 29.1 KG/M2 | SYSTOLIC BLOOD PRESSURE: 146 MMHG | HEIGHT: 68 IN | WEIGHT: 192 LBS

## 2023-05-08 DIAGNOSIS — Z72.0 TOBACCO USE: ICD-10-CM

## 2023-05-08 DIAGNOSIS — I73.9 PAD (PERIPHERAL ARTERY DISEASE) (HCC): Primary | ICD-10-CM

## 2023-05-08 PROCEDURE — 3078F DIAST BP <80 MM HG: CPT | Performed by: NURSE PRACTITIONER

## 2023-05-08 PROCEDURE — 99213 OFFICE O/P EST LOW 20 MIN: CPT | Performed by: NURSE PRACTITIONER

## 2023-05-08 PROCEDURE — 3074F SYST BP LT 130 MM HG: CPT | Performed by: NURSE PRACTITIONER

## 2023-05-08 NOTE — PROGRESS NOTES
extremities: large varicosities to both lower extremities. Palpable distal pulses   MUSCULOSKELETAL: normal gait  NEURO: sensation and strength grossly intact and symmetrical  PSYCH: alert and oriented to person, place and time      Assessment/Plan: Patient is a 63year old male who follows up for his arterial duplex study. He is c/o left leg fatigue. He has no open ulcerations or rest pain. He has undergone thrombolysis for right popliteal artery stent occlusion in October 2022. Duplex study is stable. He does have large varicosities and I have asked him to start wearing compression stockings. He is to put them on first thing in the AM and take them off before bed. I have strongly counseled him on the need for complete smoking cessation. He will continue his atorvastatin and blood thinning medication and will return in 6 months, sooner should any worsening lower extremity pain or ulcerations arise. Ultrasound duplex reviewed with Dr. Evangelist Gonzalez who is in agreement.          Yeni Clarke, APRN - CNP    20 minutes of time was spent on this encounter including chart review, assessment and evaluation

## 2023-05-17 ENCOUNTER — TELEPHONE (OUTPATIENT)
Dept: VASCULAR SURGERY | Age: 63
End: 2023-05-17

## 2023-05-17 ENCOUNTER — OFFICE VISIT (OUTPATIENT)
Dept: PRIMARY CARE CLINIC | Facility: CLINIC | Age: 63
End: 2023-05-17
Payer: COMMERCIAL

## 2023-05-17 VITALS
TEMPERATURE: 98.4 F | BODY MASS INDEX: 28.95 KG/M2 | WEIGHT: 191 LBS | HEIGHT: 68 IN | HEART RATE: 74 BPM | OXYGEN SATURATION: 98 % | SYSTOLIC BLOOD PRESSURE: 120 MMHG | DIASTOLIC BLOOD PRESSURE: 70 MMHG

## 2023-05-17 DIAGNOSIS — N52.9 ERECTILE DYSFUNCTION, UNSPECIFIED ERECTILE DYSFUNCTION TYPE: ICD-10-CM

## 2023-05-17 DIAGNOSIS — Z72.0 TOBACCO USE: ICD-10-CM

## 2023-05-17 DIAGNOSIS — E78.5 HYPERLIPIDEMIA, UNSPECIFIED HYPERLIPIDEMIA TYPE: ICD-10-CM

## 2023-05-17 DIAGNOSIS — R73.9 ELEVATED BLOOD SUGAR: ICD-10-CM

## 2023-05-17 DIAGNOSIS — I74.3 THROMBOSIS OF ARTERY OF RIGHT LOWER EXTREMITY (HCC): ICD-10-CM

## 2023-05-17 DIAGNOSIS — I70.219 ATHEROSCLEROSIS OF NATIVE ARTERY OF EXTREMITY WITH INTERMITTENT CLAUDICATION, UNSPECIFIED EXTREMITY (HCC): ICD-10-CM

## 2023-05-17 DIAGNOSIS — I10 PRIMARY HYPERTENSION: Primary | ICD-10-CM

## 2023-05-17 DIAGNOSIS — G47.33 OBSTRUCTIVE SLEEP APNEA SYNDROME: ICD-10-CM

## 2023-05-17 PROBLEM — I82.401 ACUTE DEEP VEIN THROMBOSIS (DVT) OF RIGHT LOWER EXTREMITY (HCC): Status: RESOLVED | Noted: 2022-11-15 | Resolved: 2023-05-17

## 2023-05-17 PROCEDURE — 3074F SYST BP LT 130 MM HG: CPT | Performed by: FAMILY MEDICINE

## 2023-05-17 PROCEDURE — 3078F DIAST BP <80 MM HG: CPT | Performed by: FAMILY MEDICINE

## 2023-05-17 PROCEDURE — 99214 OFFICE O/P EST MOD 30 MIN: CPT | Performed by: FAMILY MEDICINE

## 2023-05-17 RX ORDER — LOSARTAN POTASSIUM 25 MG/1
25 TABLET ORAL DAILY
Qty: 90 TABLET | Refills: 1 | Status: SHIPPED | OUTPATIENT
Start: 2023-05-17

## 2023-05-17 RX ORDER — ATORVASTATIN CALCIUM 20 MG/1
20 TABLET, FILM COATED ORAL DAILY
Qty: 90 TABLET | Refills: 1 | Status: SHIPPED | OUTPATIENT
Start: 2023-05-17

## 2023-05-17 RX ORDER — SILDENAFIL 50 MG/1
50 TABLET, FILM COATED ORAL PRN
Qty: 30 TABLET | Refills: 5 | Status: SHIPPED | OUTPATIENT
Start: 2023-05-17

## 2023-05-17 ASSESSMENT — PATIENT HEALTH QUESTIONNAIRE - PHQ9
SUM OF ALL RESPONSES TO PHQ QUESTIONS 1-9: 0
1. LITTLE INTEREST OR PLEASURE IN DOING THINGS: 0
SUM OF ALL RESPONSES TO PHQ QUESTIONS 1-9: 0
SUM OF ALL RESPONSES TO PHQ QUESTIONS 1-9: 0
SUM OF ALL RESPONSES TO PHQ9 QUESTIONS 1 & 2: 0
2. FEELING DOWN, DEPRESSED OR HOPELESS: 0
SUM OF ALL RESPONSES TO PHQ QUESTIONS 1-9: 0

## 2023-05-17 ASSESSMENT — ENCOUNTER SYMPTOMS
RESPIRATORY NEGATIVE: 1
GASTROINTESTINAL NEGATIVE: 1
ALLERGIC/IMMUNOLOGIC NEGATIVE: 1
EYES NEGATIVE: 1

## 2023-05-17 NOTE — TELEPHONE ENCOUNTER
Pt [facility] nurse line stating his PCP wanted to know how long will he be on Eliquis    ** per Shivam Novak NP he will be on Eliquis lifelong     -lmvm of this

## 2023-05-17 NOTE — PROGRESS NOTES
Annual eye exam recommended  F/u in 3 months    Orders:  -     losartan (COZAAR) 25 MG tablet; Take 1 tablet by mouth daily, Disp-90 tablet, R-1Normal  -     Comprehensive Metabolic Panel; Future  -     Lipid Panel; Future  2. Hyperlipidemia, unspecified hyperlipidemia type  Overview:  statin    Orders:  -     Comprehensive Metabolic Panel; Future  -     Lipid Panel; Future  3. Elevated blood sugar  Overview:  low carb diet  check a1c    Orders:  -     Comprehensive Metabolic Panel; Future  -     Lipid Panel; Future  4. Atherosclerosis of native artery of extremity with intermittent claudication, unspecified extremity (HCC)  Overview:  Right lower bpkdtxfha-btkeibhsl-khbemk angioplasty and stents in 2022  Orders:  -     atorvastatin (LIPITOR) 20 MG tablet; Take 1 tablet by mouth daily, Disp-90 tablet, R-1Normal  -     Comprehensive Metabolic Panel; Future  -     Lipid Panel; Future  5. 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 Erectile dysfunction, unspecified erectile dysfunction type  Overview:  viagra as needed    Orders:  -     sildenafil (VIAGRA) 50 MG tablet; Take 1 tablet by mouth as needed for Erectile Dysfunction 20 min prior to sexual activity,do not use NITRO PILLS,monitor you BP, Disp-30 tablet, R-5Normal  -     Comprehensive Metabolic Panel; Future  -     Lipid Panel; Future  7. Obstructive sleep apnea syndrome  Overview: On cpap  Orders:  -     Comprehensive Metabolic Panel; Future  -     Lipid Panel; Future  8. Erectile dysfunction, unspecified erectile dysfunction type  Comments:  viagra as needed  Overview:  viagra as needed    Orders:  -     sildenafil (VIAGRA) 50 MG tablet;  Take 1 tablet by mouth as needed for Erectile

## 2023-06-29 ENCOUNTER — OFFICE VISIT (OUTPATIENT)
Age: 63
End: 2023-06-29
Payer: COMMERCIAL

## 2023-06-29 VITALS
SYSTOLIC BLOOD PRESSURE: 148 MMHG | HEART RATE: 66 BPM | DIASTOLIC BLOOD PRESSURE: 86 MMHG | WEIGHT: 194 LBS | BODY MASS INDEX: 29.4 KG/M2 | HEIGHT: 68 IN

## 2023-06-29 DIAGNOSIS — I10 HYPERTENSION, UNSPECIFIED TYPE: ICD-10-CM

## 2023-06-29 DIAGNOSIS — I73.9 PAD (PERIPHERAL ARTERY DISEASE) (HCC): Primary | ICD-10-CM

## 2023-06-29 DIAGNOSIS — E78.5 HYPERLIPIDEMIA, UNSPECIFIED HYPERLIPIDEMIA TYPE: ICD-10-CM

## 2023-06-29 PROCEDURE — 99214 OFFICE O/P EST MOD 30 MIN: CPT | Performed by: INTERNAL MEDICINE

## 2023-06-29 PROCEDURE — 3079F DIAST BP 80-89 MM HG: CPT | Performed by: INTERNAL MEDICINE

## 2023-06-29 PROCEDURE — 3077F SYST BP >= 140 MM HG: CPT | Performed by: INTERNAL MEDICINE

## 2023-06-29 RX ORDER — LOSARTAN POTASSIUM 50 MG/1
50 TABLET ORAL DAILY
Qty: 90 TABLET | Refills: 3 | Status: SHIPPED | OUTPATIENT
Start: 2023-06-29

## 2023-08-01 DIAGNOSIS — I10 HYPERTENSION, UNSPECIFIED TYPE: ICD-10-CM

## 2023-08-01 DIAGNOSIS — N52.9 ERECTILE DYSFUNCTION, UNSPECIFIED ERECTILE DYSFUNCTION TYPE: ICD-10-CM

## 2023-08-02 ENCOUNTER — TELEPHONE (OUTPATIENT)
Age: 63
End: 2023-08-02

## 2023-08-02 RX ORDER — LOSARTAN POTASSIUM 50 MG/1
50 TABLET ORAL DAILY
Qty: 90 TABLET | Refills: 3 | Status: SHIPPED | OUTPATIENT
Start: 2023-08-02

## 2023-08-02 NOTE — TELEPHONE ENCOUNTER
Requested Prescriptions     Pending Prescriptions Disp Refills    losartan (COZAAR) 50 MG tablet 90 tablet 3     Sig: Take 1 tablet by mouth daily     Pended losartan 50 mg qd refill, as above, sent to Dr. Shanthi Melissa for approval.

## 2023-08-02 NOTE — TELEPHONE ENCOUNTER
Toy Verma MD  You 1 minute ago (1:01 PM)     ZD  I refilled the medication; however, the patient is overdue to recheck her blood work on the losartan 50 mg daily. Please encourage her to get the BMP and magnesium level that is ordered.

## 2023-08-02 NOTE — TELEPHONE ENCOUNTER
Tylor KNIGHT     1:04 PM  Note        Nai Diana MD  You 1 minute ago (1:01 PM)      ZD  I refilled the medication; however, the patient is overdue to recheck her blood work on the losartan 50 mg daily. Please encourage her to get the BMP and magnesium level that is ordered.

## 2023-08-02 NOTE — TELEPHONE ENCOUNTER
Left message on voicemail for patient to call this triage nurse. Left message on voicemail for wife, Virginia Hester, to call this triage nurse.

## 2023-08-02 NOTE — TELEPHONE ENCOUNTER
Riki Tsang of Dr. James Kahn response. Riki Tsang that patient may get BMP and magnesium drawn at any [facility] outpatient lab. Margarito Dunham verbalized understanding, and states patient will probably get  labs drawn on his next day off, 8/9/23.

## 2023-08-11 RX ORDER — SILDENAFIL 50 MG/1
50 TABLET, FILM COATED ORAL PRN
Qty: 30 TABLET | Refills: 5 | OUTPATIENT
Start: 2023-08-11

## 2023-09-20 ENCOUNTER — OFFICE VISIT (OUTPATIENT)
Dept: PRIMARY CARE CLINIC | Facility: CLINIC | Age: 63
End: 2023-09-20
Payer: COMMERCIAL

## 2023-09-20 VITALS
BODY MASS INDEX: 30.16 KG/M2 | OXYGEN SATURATION: 99 % | TEMPERATURE: 98.2 F | HEIGHT: 68 IN | HEART RATE: 72 BPM | DIASTOLIC BLOOD PRESSURE: 70 MMHG | WEIGHT: 199 LBS | SYSTOLIC BLOOD PRESSURE: 160 MMHG

## 2023-09-20 DIAGNOSIS — I10 PRIMARY HYPERTENSION: ICD-10-CM

## 2023-09-20 DIAGNOSIS — I74.3 THROMBOSIS OF ARTERY OF RIGHT LOWER EXTREMITY (HCC): ICD-10-CM

## 2023-09-20 DIAGNOSIS — L29.9 ITCHING: ICD-10-CM

## 2023-09-20 DIAGNOSIS — L50.0 ALLERGIC URTICARIA: ICD-10-CM

## 2023-09-20 DIAGNOSIS — I70.219 ATHEROSCLEROSIS OF NATIVE ARTERY OF EXTREMITY WITH INTERMITTENT CLAUDICATION, UNSPECIFIED EXTREMITY (HCC): ICD-10-CM

## 2023-09-20 DIAGNOSIS — Z72.0 TOBACCO USE: ICD-10-CM

## 2023-09-20 DIAGNOSIS — L50.0 ALLERGIC URTICARIA: Primary | ICD-10-CM

## 2023-09-20 PROCEDURE — 96372 THER/PROPH/DIAG INJ SC/IM: CPT | Performed by: FAMILY MEDICINE

## 2023-09-20 PROCEDURE — 3078F DIAST BP <80 MM HG: CPT | Performed by: FAMILY MEDICINE

## 2023-09-20 PROCEDURE — 99214 OFFICE O/P EST MOD 30 MIN: CPT | Performed by: FAMILY MEDICINE

## 2023-09-20 PROCEDURE — 3077F SYST BP >= 140 MM HG: CPT | Performed by: FAMILY MEDICINE

## 2023-09-20 RX ORDER — METHYLPREDNISOLONE SODIUM SUCCINATE 125 MG/2ML
125 INJECTION, POWDER, LYOPHILIZED, FOR SOLUTION INTRAMUSCULAR; INTRAVENOUS ONCE
Status: DISCONTINUED | OUTPATIENT
Start: 2023-09-20 | End: 2023-09-20

## 2023-09-20 RX ORDER — CETIRIZINE HYDROCHLORIDE 10 MG/1
10 TABLET ORAL DAILY
Qty: 90 TABLET | OUTPATIENT
Start: 2023-09-20 | End: 2023-10-20

## 2023-09-20 RX ORDER — CETIRIZINE HYDROCHLORIDE 10 MG/1
10 TABLET ORAL DAILY
Qty: 30 TABLET | Refills: 0 | Status: SHIPPED | OUTPATIENT
Start: 2023-09-20 | End: 2023-10-20

## 2023-09-20 RX ORDER — PREDNISONE 20 MG/1
TABLET ORAL
Qty: 11 TABLET | Refills: 0 | Status: SHIPPED | OUTPATIENT
Start: 2023-09-20

## 2023-09-20 NOTE — PROGRESS NOTES
Extraocular Movements: Extraocular movements intact. Conjunctiva/sclera: Conjunctivae normal.      Pupils: Pupils are equal, round, and reactive to light. Cardiovascular:      Rate and Rhythm: Normal rate and regular rhythm. Pulmonary:      Effort: Pulmonary effort is normal.      Breath sounds: Normal breath sounds. Abdominal:      General: Bowel sounds are normal.      Palpations: Abdomen is soft. Musculoskeletal:         General: Normal range of motion. Cervical back: Normal range of motion and neck supple. Skin:     Findings: Erythema and rash present. Comments: Papulo macular rash on extremities /abdomen and back   Neurological:      General: No focal deficit present. Mental Status: He is alert and oriented to person, place, and time. Psychiatric:         Mood and Affect: Mood normal.         Behavior: Behavior normal.         Thought Content: Thought content normal.         Judgment: Judgment normal.            ASSESSMENT/PLAN:    1. Allergic urticaria  Comments:  solumedrol  prednisone taper  zyrtec  cool compresses  avoid allergen if known  Overview:  solumedrol  prednisone taper  zyrtec  cool compresses  avoid allergen if known    Orders:  -     cetirizine (ZYRTEC) 10 MG tablet; Take 1 tablet by mouth daily, Disp-30 tablet, R-0Normal  -     predniSONE (DELTASONE) 20 MG tablet; 2 pills daily for 3 days  ,1 pill daily for 3 days and half pill daily for 4 days and stop, Disp-11 tablet, R-0Normal  -     methylPREDNISolone sodium (PF) (SOLU-MEDROL PF) injection 125 mg; 125 mg, IntraMUSCular, ONCE, 1 dose, On Wed 9/20/23 at 1115  -     Allergen (22) Panel; Future  -     Food Allergen (26) Panel; Future  2. Itching  Comments:  zyrtec as needed  Overview:  zyrtec as needed    Orders:  -     cetirizine (ZYRTEC) 10 MG tablet;  Take 1 tablet by mouth daily, Disp-30 tablet, R-0Normal  -     predniSONE (DELTASONE) 20 MG tablet; 2 pills daily for 3 days  ,1 pill daily for 3 days and half

## 2023-11-14 ENCOUNTER — OFFICE VISIT (OUTPATIENT)
Dept: VASCULAR SURGERY | Age: 63
End: 2023-11-14
Payer: COMMERCIAL

## 2023-11-14 VITALS
HEIGHT: 68 IN | WEIGHT: 199 LBS | BODY MASS INDEX: 30.16 KG/M2 | HEART RATE: 71 BPM | SYSTOLIC BLOOD PRESSURE: 162 MMHG | DIASTOLIC BLOOD PRESSURE: 90 MMHG | OXYGEN SATURATION: 97 %

## 2023-11-14 DIAGNOSIS — I73.9 PAD (PERIPHERAL ARTERY DISEASE) (HCC): Primary | ICD-10-CM

## 2023-11-14 DIAGNOSIS — I83.812 VARICOSE VEINS OF LEFT LOWER EXTREMITY WITH PAIN: ICD-10-CM

## 2023-11-14 PROCEDURE — 99213 OFFICE O/P EST LOW 20 MIN: CPT | Performed by: NURSE PRACTITIONER

## 2023-11-14 PROCEDURE — 3077F SYST BP >= 140 MM HG: CPT | Performed by: NURSE PRACTITIONER

## 2023-11-14 PROCEDURE — 3079F DIAST BP 80-89 MM HG: CPT | Performed by: NURSE PRACTITIONER

## 2023-11-14 NOTE — PROGRESS NOTES
DATE OF VISIT: 11/14/2023      [facility]  Kavon Geiger is a 66 y.o. male who follows up for his arterial duplex study. He does reports some left lower extremity leg fatigue. He has no open ulcerations. He remains on atorvastatin, ASA, Eliquis. He unfortunately still smokes off and on. October of last year he underwent thrombolysis for an occluded right popliteal artery stent. He occasionally wears compression stockings. MEDICAL HISTORY:   Past Medical History:   Diagnosis Date    Diverticulosis of colon (without mention of hemorrhage) 2015    no symptoms    Dysrhythmia, cardiac     heart palpatations on occasion    Hypercholesterolemia     managed with meds    GABE on CPAP     central and obstructive, CPAP 9 cm H2O does not wear cpap    Personal history of colonic polyps 2015    adenoma, TVA    PVD (peripheral vascular disease) with claudication ([address])     Snoring     Tobacco abuse          SURGICAL HISTORY:   Past Surgical History:   Procedure Laterality Date    COLONOSCOPY  last 2/16/15    [facility]--three trans TA, one spl flx TVA, rectal TVA--3 year recall    KNEE ARTHROSCOPY Left 1996    VASCULAR SURGERY Left 6-18-15    profundoplasty of profunda femoris artery    VASCULAR SURGERY Left 8/29/2022    RIGHT LOWER EXTREMITY ARTERIOGRAM WITH BALLOON ANGIOPLASTY AND STENT OF POPLITEAL ARTERY performed by Sera Mcneil MD at [address] Right 10/12/2022    RIGHT LEG ARTERIOGRAM POSSIBLE THROMBOLYSIS performed by Sera Mcneil MD at [facility] MAIN OR    VASCULAR SURGERY Right 10/13/2022    ARTERIOGRAM RIGHT LEG LYSIS RECHECK performed by Sera Mcneil MD at [facility] MAIN OR       Review of Systems:  Constitutional:   Negative for fevers and unexplained weight loss. Respiratory:   Negative for hemoptysis. Cardiovascular:   Negative except as noted in HPI. Musculoskeletal:  Negative for active, unexplained/severe joint pain.       ALLERGIES: No Known Allergies    CURRENT

## 2023-12-13 RX ORDER — APIXABAN 2.5 MG/1
2.5 TABLET, FILM COATED ORAL 2 TIMES DAILY
Qty: 60 TABLET | Refills: 5 | Status: SHIPPED | OUTPATIENT
Start: 2023-12-13

## 2023-12-14 ENCOUNTER — OFFICE VISIT (OUTPATIENT)
Dept: PRIMARY CARE CLINIC | Facility: CLINIC | Age: 63
End: 2023-12-14
Payer: COMMERCIAL

## 2023-12-14 VITALS
OXYGEN SATURATION: 98 % | BODY MASS INDEX: 30.31 KG/M2 | HEART RATE: 63 BPM | TEMPERATURE: 97.9 F | HEIGHT: 68 IN | WEIGHT: 200 LBS | DIASTOLIC BLOOD PRESSURE: 74 MMHG | SYSTOLIC BLOOD PRESSURE: 136 MMHG

## 2023-12-14 DIAGNOSIS — Z72.0 TOBACCO USE: ICD-10-CM

## 2023-12-14 DIAGNOSIS — I70.219 ATHEROSCLEROSIS OF NATIVE ARTERY OF EXTREMITY WITH INTERMITTENT CLAUDICATION, UNSPECIFIED EXTREMITY (HCC): ICD-10-CM

## 2023-12-14 DIAGNOSIS — G47.33 OBSTRUCTIVE SLEEP APNEA SYNDROME: ICD-10-CM

## 2023-12-14 DIAGNOSIS — I10 PRIMARY HYPERTENSION: Primary | ICD-10-CM

## 2023-12-14 DIAGNOSIS — N52.9 ERECTILE DYSFUNCTION, UNSPECIFIED ERECTILE DYSFUNCTION TYPE: ICD-10-CM

## 2023-12-14 DIAGNOSIS — E78.5 HYPERLIPIDEMIA, UNSPECIFIED HYPERLIPIDEMIA TYPE: ICD-10-CM

## 2023-12-14 DIAGNOSIS — Z23 ENCOUNTER FOR IMMUNIZATION: ICD-10-CM

## 2023-12-14 DIAGNOSIS — Z12.11 ENCOUNTER FOR SCREENING COLONOSCOPY: ICD-10-CM

## 2023-12-14 DIAGNOSIS — I74.3 THROMBOSIS OF ARTERY OF RIGHT LOWER EXTREMITY (HCC): ICD-10-CM

## 2023-12-14 PROCEDURE — 99214 OFFICE O/P EST MOD 30 MIN: CPT | Performed by: FAMILY MEDICINE

## 2023-12-14 PROCEDURE — 3078F DIAST BP <80 MM HG: CPT | Performed by: FAMILY MEDICINE

## 2023-12-14 PROCEDURE — 90674 CCIIV4 VAC NO PRSV 0.5 ML IM: CPT | Performed by: FAMILY MEDICINE

## 2023-12-14 PROCEDURE — 3075F SYST BP GE 130 - 139MM HG: CPT | Performed by: FAMILY MEDICINE

## 2023-12-14 PROCEDURE — 90471 IMMUNIZATION ADMIN: CPT | Performed by: FAMILY MEDICINE

## 2023-12-14 RX ORDER — ATORVASTATIN CALCIUM 20 MG/1
20 TABLET, FILM COATED ORAL DAILY
Qty: 90 TABLET | Refills: 1 | Status: SHIPPED | OUTPATIENT
Start: 2023-12-14

## 2023-12-14 RX ORDER — LOSARTAN POTASSIUM 50 MG/1
50 TABLET ORAL DAILY
Qty: 90 TABLET | Refills: 1 | Status: SHIPPED | OUTPATIENT
Start: 2023-12-14

## 2023-12-14 RX ORDER — SILDENAFIL 50 MG/1
50 TABLET, FILM COATED ORAL PRN
Qty: 30 TABLET | Refills: 5 | Status: SHIPPED | OUTPATIENT
Start: 2023-12-14

## 2023-12-14 NOTE — PROGRESS NOTES
HGB 15.7 04/26/2023    HCT 48.2 04/26/2023    MCV 93.1 04/26/2023     04/26/2023     Lab Results   Component Value Date    LABA1C 5.2 04/26/2023     Lab Results   Component Value Date     04/26/2023     Lab Results   Component Value Date    CHOL 172 04/26/2023     Lab Results   Component Value Date    TRIG 100 04/26/2023     Lab Results   Component Value Date    HDL 58 04/26/2023     Lab Results   Component Value Date    LDLCALC 94 04/26/2023     Lab Results   Component Value Date    LABVLDL 20 04/26/2023     Lab Results   Component Value Date    CHOLHDLRATIO 3.0 04/26/2023       US Result (most recent):  US SCROTUM AND TESTICLES   CT Result (most recent):  CT LUNG SCREENING 03/24/2023    Narrative  Exam: CT LUNG SCREENING (INITIAL/ANNUAL) on 3/24/2023 12:15 PM    Clinical History: The Male patient is 63years old  presenting for Personal  history of nicotine dependence. Pack-year history: 40  Current smoker: Yes  Any current signs or symptoms of lung carcinoma: No    Technique:  Transaxial CT imaging from the thoracic inlet through the lung bases was  performed. Low-dose protocol was implemented. (LDCT) Slice thickness is 5.48 mm. All CT scans at this facility are performed using dose reduction/dose modulation  techniques, as appropriate the performed exam, including the following:  Automated Exposure Control; Adjustment of the mA and/or kV according to patient  size (this includes techniques or standardized protocols for targeted exams  where dose is matched to indication/reason for exam); and Use of Iterative  Reconstruction Technique. Total radiation dose: 44 mGy-cm    Comparison:  None. Findings:  Note: A negative screening exam does not mean an individual does not have lung  cancer. Images through the lungs demonstrate no evidence of pulmonary nodule or mass. No  effusions are identified. There are mild emphysematous changes.     Coronary artery atherosclerotic disease No evidence

## 2023-12-22 DIAGNOSIS — I10 HYPERTENSION, UNSPECIFIED TYPE: ICD-10-CM

## 2023-12-22 LAB
ANION GAP SERPL CALC-SCNC: 7 MMOL/L (ref 2–11)
BUN SERPL-MCNC: 9 MG/DL (ref 8–23)
CALCIUM SERPL-MCNC: 9.4 MG/DL (ref 8.3–10.4)
CHLORIDE SERPL-SCNC: 108 MMOL/L (ref 103–113)
CO2 SERPL-SCNC: 28 MMOL/L (ref 21–32)
CREAT SERPL-MCNC: 0.8 MG/DL (ref 0.8–1.5)
GLUCOSE SERPL-MCNC: 91 MG/DL (ref 65–100)
MAGNESIUM SERPL-MCNC: 2.1 MG/DL (ref 1.8–2.4)
POTASSIUM SERPL-SCNC: 3.9 MMOL/L (ref 3.5–5.1)
SODIUM SERPL-SCNC: 143 MMOL/L (ref 136–146)

## 2023-12-26 ENCOUNTER — TELEPHONE (OUTPATIENT)
Age: 63
End: 2023-12-26

## 2023-12-26 NOTE — TELEPHONE ENCOUNTER
----- Message from Dottie Ferreira MD sent at 12/22/2023  7:06 PM EST -----  Please let the patient know his lab work is acceptable for losartan.

## 2024-03-15 ENCOUNTER — PREP FOR PROCEDURE (OUTPATIENT)
Dept: SURGERY | Age: 64
End: 2024-03-15

## 2024-03-15 ENCOUNTER — OFFICE VISIT (OUTPATIENT)
Dept: SURGERY | Age: 64
End: 2024-03-15

## 2024-03-15 VITALS
BODY MASS INDEX: 29.06 KG/M2 | HEART RATE: 69 BPM | SYSTOLIC BLOOD PRESSURE: 164 MMHG | HEIGHT: 70 IN | WEIGHT: 203 LBS | DIASTOLIC BLOOD PRESSURE: 103 MMHG | OXYGEN SATURATION: 93 %

## 2024-03-15 DIAGNOSIS — Z12.11 ENCOUNTER FOR SCREENING COLONOSCOPY: Primary | ICD-10-CM

## 2024-03-15 DIAGNOSIS — Z12.11 SPECIAL SCREENING FOR MALIGNANT NEOPLASMS, COLON: ICD-10-CM

## 2024-03-16 NOTE — PROGRESS NOTES
[facility]  [address], [address]  [place] [address]  [phone]     3/15/2024  Patient:  Jass Charles Jr.  : 1960    HPI  Jass Charles Jr. is a 63 y.o. male who is seen for screening colonoscopy. His last colonoscopy was 7 years ago with 5 polyps removed. He denies rectal bleeding, no black stool. No constipation.     He is life long heavy smoker, still actively smokes. He has PVD with claudication, on eliquis and ASA. Other medical issues are reviewed as below.       Past Medical History:   Diagnosis Date    Diverticulosis of colon (without mention of hemorrhage)     no symptoms    Dysrhythmia, cardiac     heart palpatations on occasion    Hypercholesterolemia     managed with meds    Hypertension     GABE on CPAP     central and obstructive, CPAP 9 cm H2O does not wear cpap    Personal history of colonic polyps     adenoma, TVA    PVD (peripheral vascular disease) with claudication (HCC)     Snoring     Tobacco abuse      Current Outpatient Medications   Medication Sig Dispense Refill    atorvastatin (LIPITOR) 20 MG tablet Take 1 tablet by mouth daily 90 tablet 1    losartan (COZAAR) 50 MG tablet Take 1 tablet by mouth daily 90 tablet 1    sildenafil (VIAGRA) 50 MG tablet Take 1 tablet by mouth as needed for Erectile Dysfunction 20 min prior to sexual activity,do not use NITRO PILLS,monitor you BP 30 tablet 5    ELIQUIS 2.5 MG TABS tablet TAKE 1 TABLET BY MOUTH TWICE DAILY 60 tablet 5    Multiple Vitamin (MULTIVITAMIN ADULT PO) Take 1 tablet by mouth daily      aspirin 81 MG EC tablet Take 1 tablet by mouth daily       No current facility-administered medications for this visit.     No Known Allergies  Past Surgical History:   Procedure Laterality Date    COLONOSCOPY  last 2/16/15    [facility]--three trans TA, one spl flx TVA, rectal TVA--3 year recall    KNEE ARTHROSCOPY Left     VASCULAR SURGERY Left 6-18-15    profundoplasty of

## 2024-03-20 RX ORDER — SILDENAFIL 50 MG/1
50 TABLET, FILM COATED ORAL PRN
Qty: 30 TABLET | Refills: 2 | Status: SHIPPED | OUTPATIENT
Start: 2024-03-20

## 2024-04-14 PROBLEM — Z12.11 SPECIAL SCREENING FOR MALIGNANT NEOPLASMS, COLON: Status: RESOLVED | Noted: 2024-03-15 | Resolved: 2024-04-14

## 2024-04-14 SDOH — ECONOMIC STABILITY: FOOD INSECURITY: WITHIN THE PAST 12 MONTHS, THE FOOD YOU BOUGHT JUST DIDN'T LAST AND YOU DIDN'T HAVE MONEY TO GET MORE.: PATIENT DECLINED

## 2024-04-14 SDOH — ECONOMIC STABILITY: INCOME INSECURITY: HOW HARD IS IT FOR YOU TO PAY FOR THE VERY BASICS LIKE FOOD, HOUSING, MEDICAL CARE, AND HEATING?: PATIENT DECLINED

## 2024-04-14 SDOH — ECONOMIC STABILITY: HOUSING INSECURITY
IN THE LAST 12 MONTHS, WAS THERE A TIME WHEN YOU DID NOT HAVE A STEADY PLACE TO SLEEP OR SLEPT IN A SHELTER (INCLUDING NOW)?: YES

## 2024-04-14 SDOH — ECONOMIC STABILITY: FOOD INSECURITY: WITHIN THE PAST 12 MONTHS, YOU WORRIED THAT YOUR FOOD WOULD RUN OUT BEFORE YOU GOT MONEY TO BUY MORE.: PATIENT DECLINED

## 2024-04-14 ASSESSMENT — PATIENT HEALTH QUESTIONNAIRE - PHQ9
SUM OF ALL RESPONSES TO PHQ QUESTIONS 1-9: 0
1. LITTLE INTEREST OR PLEASURE IN DOING THINGS: NOT AT ALL
SUM OF ALL RESPONSES TO PHQ9 QUESTIONS 1 & 2: 0
2. FEELING DOWN, DEPRESSED OR HOPELESS: NOT AT ALL
SUM OF ALL RESPONSES TO PHQ QUESTIONS 1-9: 0

## 2024-04-16 ASSESSMENT — PATIENT HEALTH QUESTIONNAIRE - PHQ9
SUM OF ALL RESPONSES TO PHQ9 QUESTIONS 1 & 2: 0
2. FEELING DOWN, DEPRESSED OR HOPELESS: NOT AT ALL
1. LITTLE INTEREST OR PLEASURE IN DOING THINGS: NOT AT ALL

## 2024-05-09 PROBLEM — Z12.11 SPECIAL SCREENING FOR MALIGNANT NEOPLASMS, COLON: Status: ACTIVE | Noted: 2024-03-15

## 2024-05-20 ENCOUNTER — TELEPHONE (OUTPATIENT)
Dept: VASCULAR SURGERY | Age: 64
End: 2024-05-20

## 2024-06-06 DIAGNOSIS — I70.219 ATHEROSCLEROSIS OF NATIVE ARTERY OF EXTREMITY WITH INTERMITTENT CLAUDICATION, UNSPECIFIED EXTREMITY (HCC): ICD-10-CM

## 2024-06-08 PROBLEM — Z12.11 SPECIAL SCREENING FOR MALIGNANT NEOPLASMS, COLON: Status: RESOLVED | Noted: 2024-03-15 | Resolved: 2024-06-08

## 2024-06-11 ENCOUNTER — PREP FOR PROCEDURE (OUTPATIENT)
Dept: SURGERY | Age: 64
End: 2024-06-11

## 2024-06-11 RX ORDER — SODIUM CHLORIDE 9 MG/ML
INJECTION, SOLUTION INTRAVENOUS PRN
Status: CANCELLED | OUTPATIENT
Start: 2024-06-11

## 2024-06-11 RX ORDER — SODIUM CHLORIDE 0.9 % (FLUSH) 0.9 %
5-40 SYRINGE (ML) INJECTION PRN
Status: CANCELLED | OUTPATIENT
Start: 2024-06-11

## 2024-06-11 RX ORDER — ATORVASTATIN CALCIUM 20 MG/1
20 TABLET, FILM COATED ORAL DAILY
Qty: 90 TABLET | Refills: 1 | OUTPATIENT
Start: 2024-06-11

## 2024-06-11 RX ORDER — SODIUM CHLORIDE 0.9 % (FLUSH) 0.9 %
5-40 SYRINGE (ML) INJECTION EVERY 12 HOURS SCHEDULED
Status: CANCELLED | OUTPATIENT
Start: 2024-06-11

## 2024-06-28 ENCOUNTER — OFFICE VISIT (OUTPATIENT)
Dept: PRIMARY CARE CLINIC | Facility: CLINIC | Age: 64
End: 2024-06-28
Payer: COMMERCIAL

## 2024-06-28 VITALS
SYSTOLIC BLOOD PRESSURE: 156 MMHG | OXYGEN SATURATION: 96 % | HEIGHT: 70 IN | BODY MASS INDEX: 28.77 KG/M2 | WEIGHT: 201 LBS | DIASTOLIC BLOOD PRESSURE: 84 MMHG | HEART RATE: 74 BPM | TEMPERATURE: 98 F

## 2024-06-28 DIAGNOSIS — E78.5 HYPERLIPIDEMIA, UNSPECIFIED HYPERLIPIDEMIA TYPE: ICD-10-CM

## 2024-06-28 DIAGNOSIS — L50.0 ALLERGIC URTICARIA: ICD-10-CM

## 2024-06-28 DIAGNOSIS — Z72.0 TOBACCO USE: ICD-10-CM

## 2024-06-28 DIAGNOSIS — Z12.2 SCREENING FOR LUNG CANCER: ICD-10-CM

## 2024-06-28 DIAGNOSIS — G47.33 OBSTRUCTIVE SLEEP APNEA SYNDROME: ICD-10-CM

## 2024-06-28 DIAGNOSIS — N52.9 ERECTILE DYSFUNCTION, UNSPECIFIED ERECTILE DYSFUNCTION TYPE: ICD-10-CM

## 2024-06-28 DIAGNOSIS — Z23 ENCOUNTER FOR IMMUNIZATION: ICD-10-CM

## 2024-06-28 DIAGNOSIS — M25.551 RIGHT HIP PAIN: ICD-10-CM

## 2024-06-28 DIAGNOSIS — M25.511 CHRONIC RIGHT SHOULDER PAIN: ICD-10-CM

## 2024-06-28 DIAGNOSIS — I74.3 THROMBOSIS OF ARTERY OF RIGHT LOWER EXTREMITY (HCC): ICD-10-CM

## 2024-06-28 DIAGNOSIS — G89.29 CHRONIC RIGHT SHOULDER PAIN: ICD-10-CM

## 2024-06-28 DIAGNOSIS — I10 PRIMARY HYPERTENSION: Primary | ICD-10-CM

## 2024-06-28 DIAGNOSIS — E66.3 OVERWEIGHT: ICD-10-CM

## 2024-06-28 DIAGNOSIS — Z12.11 ENCOUNTER FOR SCREENING COLONOSCOPY: ICD-10-CM

## 2024-06-28 DIAGNOSIS — Z71.6 ENCOUNTER FOR SMOKING CESSATION COUNSELING: ICD-10-CM

## 2024-06-28 DIAGNOSIS — I70.219 ATHEROSCLEROSIS OF NATIVE ARTERY OF EXTREMITY WITH INTERMITTENT CLAUDICATION, UNSPECIFIED EXTREMITY (HCC): ICD-10-CM

## 2024-06-28 DIAGNOSIS — Z71.3 DIETARY COUNSELING: ICD-10-CM

## 2024-06-28 DIAGNOSIS — I10 PRIMARY HYPERTENSION: ICD-10-CM

## 2024-06-28 DIAGNOSIS — M54.31 RIGHT SIDED SCIATICA: ICD-10-CM

## 2024-06-28 DIAGNOSIS — F33.0 MAJOR DEPRESSIVE DISORDER, RECURRENT, MILD (HCC): ICD-10-CM

## 2024-06-28 DIAGNOSIS — F33.1 MAJOR DEPRESSIVE DISORDER, RECURRENT, MODERATE (HCC): ICD-10-CM

## 2024-06-28 DIAGNOSIS — Z87.891 PERSONAL HISTORY OF TOBACCO USE: ICD-10-CM

## 2024-06-28 PROBLEM — F33.9 MAJOR DEPRESSIVE DISORDER, RECURRENT, UNSPECIFIED (HCC): Status: ACTIVE | Noted: 2024-06-28

## 2024-06-28 LAB
ALBUMIN SERPL-MCNC: 4.1 G/DL (ref 3.2–4.6)
ALBUMIN/GLOB SERPL: 1.3 (ref 1–1.9)
ALP SERPL-CCNC: 71 U/L (ref 40–129)
ALT SERPL-CCNC: 19 U/L (ref 12–65)
ANION GAP SERPL CALC-SCNC: 10 MMOL/L (ref 9–18)
AST SERPL-CCNC: 20 U/L (ref 15–37)
BILIRUB SERPL-MCNC: 0.6 MG/DL (ref 0–1.2)
BUN SERPL-MCNC: 15 MG/DL (ref 8–23)
CALCIUM SERPL-MCNC: 9.5 MG/DL (ref 8.8–10.2)
CHLORIDE SERPL-SCNC: 106 MMOL/L (ref 98–107)
CHOLEST SERPL-MCNC: 140 MG/DL (ref 0–200)
CO2 SERPL-SCNC: 26 MMOL/L (ref 20–28)
CREAT SERPL-MCNC: 0.71 MG/DL (ref 0.8–1.3)
GLOBULIN SER CALC-MCNC: 3.1 G/DL (ref 2.3–3.5)
GLUCOSE SERPL-MCNC: 103 MG/DL (ref 70–99)
HDLC SERPL-MCNC: 48 MG/DL (ref 40–60)
HDLC SERPL: 2.9 (ref 0–5)
LDLC SERPL CALC-MCNC: 79 MG/DL (ref 0–100)
POTASSIUM SERPL-SCNC: 4.1 MMOL/L (ref 3.5–5.1)
PROT SERPL-MCNC: 7.2 G/DL (ref 6.3–8.2)
SODIUM SERPL-SCNC: 141 MMOL/L (ref 136–145)
TRIGL SERPL-MCNC: 66 MG/DL (ref 0–150)
VLDLC SERPL CALC-MCNC: 13 MG/DL (ref 6–23)

## 2024-06-28 PROCEDURE — 99214 OFFICE O/P EST MOD 30 MIN: CPT | Performed by: FAMILY MEDICINE

## 2024-06-28 PROCEDURE — 3077F SYST BP >= 140 MM HG: CPT | Performed by: FAMILY MEDICINE

## 2024-06-28 PROCEDURE — 3079F DIAST BP 80-89 MM HG: CPT | Performed by: FAMILY MEDICINE

## 2024-06-28 RX ORDER — BUPROPION HYDROCHLORIDE 150 MG/1
150 TABLET, EXTENDED RELEASE ORAL 2 TIMES DAILY
Qty: 180 TABLET | Refills: 0 | Status: SHIPPED | OUTPATIENT
Start: 2024-06-28

## 2024-06-28 RX ORDER — LOSARTAN POTASSIUM 100 MG/1
100 TABLET ORAL DAILY
Qty: 90 TABLET | Refills: 1 | Status: SHIPPED | OUTPATIENT
Start: 2024-06-28

## 2024-06-28 RX ORDER — SILDENAFIL 50 MG/1
50 TABLET, FILM COATED ORAL PRN
Qty: 30 TABLET | Refills: 2 | Status: SHIPPED | OUTPATIENT
Start: 2024-06-28

## 2024-06-28 RX ORDER — ATORVASTATIN CALCIUM 20 MG/1
20 TABLET, FILM COATED ORAL DAILY
Qty: 90 TABLET | Refills: 1 | Status: SHIPPED | OUTPATIENT
Start: 2024-06-28

## 2024-06-28 ASSESSMENT — ENCOUNTER SYMPTOMS
ALLERGIC/IMMUNOLOGIC NEGATIVE: 1
EYES NEGATIVE: 1
GASTROINTESTINAL NEGATIVE: 1
RESPIRATORY NEGATIVE: 1

## 2024-06-28 NOTE — PROGRESS NOTES
Patient verified name, , and procedure.    Type: 1a; abbreviated assessment per anesthesia guidelines    Labs per anesthesia: None    Instructed pt that they will be notified the day before their procedure by the GI Lab for time of arrival if their procedure is [place] and Pre-op for [place] cases. Arrival times should be called by 5 pm. If no phone is received the patient should contact their respective hospital. The GI lab telephone number is [phone] and ES Pre-op is [phone].     Follow diet and prep instructions per office including NPO status. Patient has not received bowel prep instructions yet. Advised patient to call office this morning to obtain. Staff message sent to surgery scheduler (Mary Morris.)    Bath or shower the night before and the am of surgery with non-moisturizing soap. No lotions, oils, powders, cologne on skin. No make up, eye make up or jewelry. Wear loose fitting comfortable, clean clothing.     Must have adult present in building the entire time .     Patient instructed to continue taking all prescription medications up to the day of surgery but to take only the following medications the day of surgery according to anesthesia guidelines with a small sip of water:      Aspirin 81 mg (taking for PAD - RLE stent)  Atorvastatin (Lipitor)      Patient informed that all vitamins and supplements should be held 7 days prior to surgery and NSAIDS 5 days prior to surgery. Prescription meds to hold:  Apixaban (Eliquis) - Follow instructions provided by surgeon and/or prescribing provider. (Patient says he was told by Dr. Echevarria to stop 5 days prior to procedure.)  Sildenafil (Viagra) - Hold 24 hours prior to procedure.    The following discharge instructions reviewed with patient: medication given during procedure may cause drowsiness for several hours, therefore, do not drive or operate machinery for remainder of the day. You may not drink alcohol on the day of your procedure, please

## 2024-06-28 NOTE — PROGRESS NOTES
counseling  Comments:  low calorie diet  Overview:  low calorie diet         Orders Placed This Encounter   Procedures    CT LUNG SCREENING     Age: 64 y.o.   Smoking History:   Social History    Tobacco Use      Smoking status: Every Day        Packs/day: 1.00        Years: 1 pack/day for 50.5 years (50.5 ttl pk-yrs)        Types: Cigarettes        Start date: 1/1/1974        Passive exposure: Current      Smokeless tobacco: Never    Vaping Use      Vaping Use: Never used    Alcohol use: Not Currently    Drug use: Yes      Types: Marijuana (Weed)      Comment: OCCASIONALY    Pack years: 0  3/24/2023     Standing Status:   Future     Standing Expiration Date:   12/28/2025     Order Specific Question:   Is there documentation of shared decision making?     Answer:   Yes     Order Specific Question:   Does the patient show any signs or symptoms of lung cancer?     Answer:   No     Order Specific Question:   Is this the first (baseline) CT or an annual exam?     Answer:   Annual [2]     Order Specific Question:   Is this a low dose CT or a routine CT?     Answer:   Low Dose CT [1]     Order Specific Question:   Smoking Status?     Answer:   Every Day [1]     Order Specific Question:   Smoking packs per day?     Answer:   1     Order Specific Question:   Years smoking?     Answer:   50     Order Specific Question:   Pack Years     Answer:   51        Orders Placed This Encounter   Medications    atorvastatin (LIPITOR) 20 MG tablet     Sig: Take 1 tablet by mouth daily     Dispense:  90 tablet     Refill:  1    losartan (COZAAR) 100 MG tablet     Sig: Take 1 tablet by mouth daily Dose increased to 100 mg on 6/28/24     Dispense:  90 tablet     Refill:  1    sildenafil (VIAGRA) 50 MG tablet     Sig: Take 1 tablet by mouth as needed for Erectile Dysfunction 20 min prior to sexual activity,do not use NITRO PILLS,monitor you BP     Dispense:  30 tablet     Refill:  2    buPROPion (WELLBUTRIN SR) 150 MG extended release

## 2024-07-01 ENCOUNTER — ANESTHESIA EVENT (OUTPATIENT)
Dept: ENDOSCOPY | Age: 64
End: 2024-07-01
Payer: COMMERCIAL

## 2024-07-01 RX ORDER — ONDANSETRON 2 MG/ML
4 INJECTION INTRAMUSCULAR; INTRAVENOUS
Status: CANCELLED | OUTPATIENT
Start: 2024-07-01 | End: 2024-07-02

## 2024-07-01 RX ORDER — NALOXONE HYDROCHLORIDE 0.4 MG/ML
INJECTION, SOLUTION INTRAMUSCULAR; INTRAVENOUS; SUBCUTANEOUS PRN
Status: CANCELLED | OUTPATIENT
Start: 2024-07-01

## 2024-07-01 RX ORDER — PROCHLORPERAZINE EDISYLATE 5 MG/ML
5 INJECTION INTRAMUSCULAR; INTRAVENOUS
Status: CANCELLED | OUTPATIENT
Start: 2024-07-01 | End: 2024-07-02

## 2024-07-01 RX ORDER — SODIUM CHLORIDE, SODIUM LACTATE, POTASSIUM CHLORIDE, CALCIUM CHLORIDE 600; 310; 30; 20 MG/100ML; MG/100ML; MG/100ML; MG/100ML
INJECTION, SOLUTION INTRAVENOUS CONTINUOUS
Status: CANCELLED | OUTPATIENT
Start: 2024-07-01

## 2024-07-02 ENCOUNTER — HOSPITAL ENCOUNTER (OUTPATIENT)
Age: 64
Setting detail: OUTPATIENT SURGERY
Discharge: HOME | End: 2024-07-02
Attending: SURGERY | Admitting: SURGERY
Payer: COMMERCIAL

## 2024-07-02 ENCOUNTER — ANESTHESIA (OUTPATIENT)
Dept: ENDOSCOPY | Age: 64
End: 2024-07-02
Payer: COMMERCIAL

## 2024-07-02 VITALS
WEIGHT: 219 LBS | TEMPERATURE: 36.8 F | SYSTOLIC BLOOD PRESSURE: 175 MMHG | BODY MASS INDEX: 31.35 KG/M2 | OXYGEN SATURATION: 98 % | RESPIRATION RATE: 15 BRPM | DIASTOLIC BLOOD PRESSURE: 84 MMHG | HEIGHT: 70 IN | HEART RATE: 55 BPM

## 2024-07-02 PROCEDURE — 6360000002 HC RX W HCPCS: Performed by: NURSE ANESTHETIST, CERTIFIED REGISTERED

## 2024-07-02 PROCEDURE — 2709999900 HC NON-CHARGEABLE SUPPLY: Performed by: SURGERY

## 2024-07-02 PROCEDURE — 3700000001 HC ADD 15 MINUTES (ANESTHESIA): Performed by: SURGERY

## 2024-07-02 PROCEDURE — 7100000010 HC PHASE II RECOVERY - FIRST 15 MIN: Performed by: SURGERY

## 2024-07-02 PROCEDURE — 2580000003 HC RX 258: Performed by: ANESTHESIOLOGY

## 2024-07-02 PROCEDURE — 7100000011 HC PHASE II RECOVERY - ADDTL 15 MIN: Performed by: SURGERY

## 2024-07-02 PROCEDURE — 3609010500 HC COLONOSCOPY POLYPECTOMY REMOVAL HOT BIOPSY/STOMA: Performed by: SURGERY

## 2024-07-02 PROCEDURE — 88305 TISSUE EXAM BY PATHOLOGIST: CPT

## 2024-07-02 PROCEDURE — 3700000000 HC ANESTHESIA ATTENDED CARE: Performed by: SURGERY

## 2024-07-02 PROCEDURE — 2500000003 HC RX 250 WO HCPCS: Performed by: NURSE ANESTHETIST, CERTIFIED REGISTERED

## 2024-07-02 RX ORDER — PROPOFOL 10 MG/ML
INJECTION, EMULSION INTRAVENOUS CONTINUOUS PRN
Status: DISCONTINUED | OUTPATIENT
Start: 2024-07-02 | End: 2024-07-02 | Stop reason: SDUPTHER

## 2024-07-02 RX ORDER — SODIUM CHLORIDE 9 MG/ML
INJECTION, SOLUTION INTRAVENOUS PRN
Status: DISCONTINUED | OUTPATIENT
Start: 2024-07-02 | End: 2024-07-02 | Stop reason: HOSPADM

## 2024-07-02 RX ORDER — LIDOCAINE HYDROCHLORIDE 10 MG/ML
1 INJECTION, SOLUTION INFILTRATION; PERINEURAL
Status: DISCONTINUED | OUTPATIENT
Start: 2024-07-02 | End: 2024-07-02 | Stop reason: HOSPADM

## 2024-07-02 RX ORDER — SODIUM CHLORIDE 0.9 % (FLUSH) 0.9 %
5-40 SYRINGE (ML) INJECTION PRN
Status: DISCONTINUED | OUTPATIENT
Start: 2024-07-02 | End: 2024-07-02 | Stop reason: HOSPADM

## 2024-07-02 RX ORDER — ONDANSETRON 2 MG/ML
4 INJECTION INTRAMUSCULAR; INTRAVENOUS
Status: DISCONTINUED | OUTPATIENT
Start: 2024-07-02 | End: 2024-07-02 | Stop reason: HOSPADM

## 2024-07-02 RX ORDER — LIDOCAINE HYDROCHLORIDE 20 MG/ML
INJECTION, SOLUTION EPIDURAL; INFILTRATION; INTRACAUDAL; PERINEURAL PRN
Status: DISCONTINUED | OUTPATIENT
Start: 2024-07-02 | End: 2024-07-02 | Stop reason: SDUPTHER

## 2024-07-02 RX ORDER — SODIUM CHLORIDE 0.9 % (FLUSH) 0.9 %
5-40 SYRINGE (ML) INJECTION EVERY 12 HOURS SCHEDULED
Status: DISCONTINUED | OUTPATIENT
Start: 2024-07-02 | End: 2024-07-02 | Stop reason: HOSPADM

## 2024-07-02 RX ORDER — SODIUM CHLORIDE, SODIUM LACTATE, POTASSIUM CHLORIDE, CALCIUM CHLORIDE 600; 310; 30; 20 MG/100ML; MG/100ML; MG/100ML; MG/100ML
INJECTION, SOLUTION INTRAVENOUS CONTINUOUS
Status: DISCONTINUED | OUTPATIENT
Start: 2024-07-02 | End: 2024-07-02 | Stop reason: HOSPADM

## 2024-07-02 RX ADMIN — PROPOFOL 200 MCG/KG/MIN: 10 INJECTION, EMULSION INTRAVENOUS at 09:19

## 2024-07-02 RX ADMIN — PROPOFOL 40 MG: 10 INJECTION, EMULSION INTRAVENOUS at 09:21

## 2024-07-02 RX ADMIN — LIDOCAINE HYDROCHLORIDE 50 MG: 20 INJECTION, SOLUTION EPIDURAL; INFILTRATION; INTRACAUDAL; PERINEURAL at 09:19

## 2024-07-02 RX ADMIN — SODIUM CHLORIDE, POTASSIUM CHLORIDE, SODIUM LACTATE AND CALCIUM CHLORIDE: 600; 310; 30; 20 INJECTION, SOLUTION INTRAVENOUS at 08:27

## 2024-07-02 RX ADMIN — PROPOFOL 100 MG: 10 INJECTION, EMULSION INTRAVENOUS at 09:20

## 2024-07-02 ASSESSMENT — LIFESTYLE VARIABLES: SMOKING_STATUS: 1

## 2024-07-02 NOTE — ANESTHESIA POSTPROCEDURE EVALUATION
Department of Anesthesiology  Postprocedure Note    Patient: Jass Charles Jr.  MRN: [id number]  YOB: 1960  Date of evaluation: 7/2/2024    Procedure Summary       Date: 07/02/24 Room / Location: [facility] 03 / [facility] ENDOSCOPY    Anesthesia Start: 0912 Anesthesia Stop: 0956    Procedure: COLONOSCOPY POLYPECTOMY REMOVAL HOT BIOPSY (Lower GI Region) Diagnosis:       Special screening for malignant neoplasms, colon      (Special screening for malignant neoplasms, colon [Z12.11])    Surgeons: Alicia Becerril MD Responsible Provider: Abad De Jesus MD    Anesthesia Type: TIVA ASA Status: 3            Anesthesia Type: TIVA    Otf Phase I:      Otf Phase II:      Anesthesia Post Evaluation    Patient location during evaluation: PACU  Patient participation: complete - patient participated  Level of consciousness: sleepy but conscious  Airway patency: patent  Nausea & Vomiting: no nausea and no vomiting  Cardiovascular status: blood pressure returned to baseline and hemodynamically stable  Respiratory status: acceptable  Hydration status: euvolemic  There was medical reason for not using a multimodal analgesia pain management approach.Pain management: adequate    No notable events documented.

## 2024-07-02 NOTE — DISCHARGE INSTRUCTIONS
Gastrointestinal Colonoscopy/Flexible Sigmoidoscopy - Lower Exam Discharge Instructions    Call Dr. Becerril at [phone] for any problems or questions.    Contact the doctor’s office for follow up appointment as directed.    Medication may cause drowsiness for several hours, therefore:  Do not drive or operate machinery for reminder of the day.  No alcohol today.  Do not make any important or legal decisions for 24 hours.  Do not sign any legal documents for 24 hours.    Ordinarily, you may resume regular diet and activity after exam unless otherwise specified by your physician.    Because of air put into your colon during exam, you may experience some abdominal distension, relieved by the passage of gas, for several hours.    Contact your physician if you have any of the following:  Excessive amount of bleeding - large amount when having a bowel movement.  Occasional specks of blood with bowel movement would not be unusual.  Severe abdominal pain  Fever or Chills    Polyp Removal - follow these additional instructions   Take Metamucil - 1 tablespoon in juice every morning for 3 days, as needed for constipation.

## 2024-07-02 NOTE — ANESTHESIA PRE PROCEDURE
08:45 AM       POC Tests: No results for input(s): \"POCGLU\", \"POCNA\", \"POCK\", \"POCCL\", \"POCBUN\", \"POCHEMO\", \"POCHCT\" in the last 72 hours.    Coags:   Lab Results   Component Value Date/Time    PROTIME 13.6 10/11/2022 10:08 PM    INR 1.0 10/11/2022 10:08 PM    APTT 38.3 10/13/2022 05:32 AM       HCG (If Applicable): No results found for: \"PREGTESTUR\", \"PREGSERUM\", \"HCG\", \"HCGQUANT\"     ABGs: No results found for: \"PHART\", \"PO2ART\", \"RJA9OLA\", \"WMH5TQT\", \"BEART\", \"P9RBRERR\"     Type & Screen (If Applicable):  No results found for: \"LABABO\"    Drug/Infectious Status (If Applicable):  No results found for: \"HIV\", \"HEPCAB\"    COVID-19 Screening (If Applicable): No results found for: \"COVID19\"        Anesthesia Evaluation  Patient summary reviewed and Nursing notes reviewed  Airway: Mallampati: II  TM distance: >3 FB   Neck ROM: full  Mouth opening: > = 3 FB   Dental:    (+) upper dentures and poor dentition      Pulmonary: breath sounds clear to auscultation  (+)     sleep apnea: on noncompliant,       current smoker          Patient smoked on day of surgery.                 Cardiovascular:  Exercise tolerance: good (>4 METS)  (+) hypertension: mild, hyperlipidemia        Rhythm: regular  Rate: normal                 ROS comment: Denies CP, SOB or changes in functional status     Neuro/Psych:   (+) psychiatric history: stable with treatmentdepression/anxiety             GI/Hepatic/Renal: Neg GI/Hepatic/Renal ROS            Endo/Other: Negative Endo/Other ROS                    Abdominal:             Vascular:   + PVD, aortic or cerebral.       Other Findings:       Anesthesia Plan      TIVA     ASA 3       Induction: intravenous.      Anesthetic plan and risks discussed with patient.                    Abad De Jesus MD   7/2/2024             [facility]

## 2024-07-02 NOTE — H&P
to auscultation bilaterally, breathing is non-labored   Chest:              Symmetric throughout one respiratory excursion; no supraclavicular lymphadenopathy  CV:                  Regular rate and rhythm, no appreciable murmurs, rubs, gallops  Abdomen:        Soft, protuberant but non-distended; bowel sounds are normoactive   Extremities:     No cyanosis, clubbing or edema  Neurological:   No focal signs        Labs:         Lab Results   Component Value Date/Time     APTT 38.3 10/13/2022 05:32 AM     INR 1.0 10/11/2022 10:08 PM         Assessment/Plan:   Jass Charles Beatris is a 63 y.o. male who has signs and symptoms consistent with history of colon polyps. Need screening colonoscopy. Hold eliquis 3 days prior, hold ASA 5 days prior.      Alicia Becerril MD

## 2024-07-02 NOTE — OP NOTE
[address]  [address]                            OPERATIVE REPORT      PATIENT NAME: JEN RENEE      : 1960  MED REC NO: [id number]                       ROOM: [facility]  ACCOUNT NO: [id number]                       ADMIT DATE: 2024  PROVIDER: Alicia Becerril MD    DATE OF SERVICE:  2024    PREOPERATIVE DIAGNOSES:  Needs a screening colonoscopy.    POSTOPERATIVE DIAGNOSES:       1. Right colon polyp.     2. Severe left-sided diverticulosis.    PROCEDURES PERFORMED:  Colonoscopy with polypectomy with hot forceps.    SURGEON:  Alicia Becerril MD    ANESTHESIA:  general    ESTIMATED BLOOD LOSS:  none    SPECIMENS REMOVED:  as above    INTRAOPERATIVE FINDINGS:  As the postop diagnoses, he has a 5 mm colon polyp in the right colon and he has severe diverticulosis mainly involving the left side.     COMPLICATIONS:  none    IMPLANTS:  none    INDICATIONS:  This is a 64-year-old male presenting with screening colonoscopy.  He understood risks and benefits and agreed to proceed.    DESCRIPTION OF PROCEDURE:  After the informed consent obtained, the patient was brought into the GI suite.  Monitored anesthesia care was administered.  The patient left on in left decubitus position.  Digital rectal exam was performed, which was normal.  Then the regular colonoscope was used to advance under direct vision.  As noted, he does have relatively poor prep with moderate amount of liquidy stool and the scope was also difficult to advance due to his diverticulosis and floppy colon, but with careful manipulation, this was able to be advanced all the way into the cecum.  The ileocecal valve was clearly identified.  The scope was then withdrawn and all colon was inspected carefully.  A small polyp in the right colon was removed with hot forceps.  Minimal bleeding at the end of the biopsy.  The rest of his transverse and descending colon appeared to be

## 2024-07-03 LAB
A ALTERNATA IGE QN: <0.1 KU/L
A FUMIGATUS IGE QN: <0.1 KU/L
AMER BEECH IGE QN: <0.1 KU/L
BERMUDA GRASS IGE QN: <0.1 KU/L
BOXELDER IGE QN: <0.1 KU/L
C HERBARUM IGE QN: <0.1 KU/L
CAT DANDER IGG QN: <0.1 KU/L
COMMON RAGWEED IGE QN: <0.1 KU/L
COTTONWOOD IGE QN: <0.1 KU/L
D FARINAE IGE QN: <0.1 KU/L
DEPRECATED IGE QN: <0.1 KU/L
DOG DANDER IGE QN: <0.1 KU/L
ENGL PLANTAIN IGE QN: <0.1 KU/L
GIANT RAGWEED IGE QN: <0.1 KU/L
GOOSEFOOT IGE QN: <0.1 KU/L
HOUSE DUST HS IGE QN: <0.1 KU/L
KENT BLUE GRASS IGE QN: <0.1 KU/L
Lab: NORMAL
P NOTATUM IGE QN: <0.1 KU/L
ROACH IGG QN: <0.1 KU/L
S ROSTRATA IGE QN: <0.1 KU/L
TIMOTHY IGE QN: <0.1 KU/L
WHITE OAK IGE QN: <0.1 KU/L

## 2024-07-06 LAB
ALLERGEN CABBAGE IGE: <0.1 KU/L
ALLERGEN CRAB IGE: <0.1 KU/L
ALLERGEN RYE IGE: <0.1 KU/L
BARLEY IGE QN: <0.1 KU/L
BEEF IGE QN: <0.1 KU/L
CARROT IGE QN: <0.1 KU/L
CHICKEN MEAT IGE QN: <0.1 KU/L
CODFISH IGE QN: <0.1 KU/L
CORN IGE QN: <0.1 KU/L
COW MILK IGE QN: <0.1 KU/L
EGG WHITE IGE QN: <0.1 KU/L
GRAPE IGE QN: <0.1 KU/L
GREEN PEPPER IGE QN: <0.1 KU/L
LETTUCE IGE QN: <0.1 KU/L
Lab: NORMAL
OAT IGE QN: <0.1 KU/L
ORANGE IGE QN: <0.1 KU/L
PEANUT IGE QN: <0.1 KU/L
PORK IGE QN: <0.1 KU/L
POTATO IGE QN: <0.1 KU/L
RICE IGE QN: <0.1 KU/L
SHRIMP IGE QN: <0.1 KU/L
SOYBEAN IGE QN: <0.1 KU/L
TOMATO IGE QN: <0.1 KU/L
TUNA IGE QN: <0.1 KU/L
WHEAT IGE QN: <0.1 KU/L
WHITE BEAN IGE QN: <0.1 KU/L

## 2024-07-08 RX ORDER — APIXABAN 2.5 MG/1
2.5 TABLET, FILM COATED ORAL 2 TIMES DAILY
Qty: 60 TABLET | Refills: 0 | Status: SHIPPED | OUTPATIENT
Start: 2024-07-08 | End: 2024-08-07

## 2024-07-18 ENCOUNTER — HOSPITAL ENCOUNTER (OUTPATIENT)
Dept: CT IMAGING | Age: 64
Discharge: HOME | End: 2024-07-18
Attending: FAMILY MEDICINE
Payer: COMMERCIAL

## 2024-07-18 VITALS — WEIGHT: 202 LBS | HEIGHT: 70 IN | BODY MASS INDEX: 28.92 KG/M2

## 2024-07-18 DIAGNOSIS — Z12.2 SCREENING FOR LUNG CANCER: ICD-10-CM

## 2024-07-18 DIAGNOSIS — Z87.891 PERSONAL HISTORY OF TOBACCO USE: ICD-10-CM

## 2024-07-18 PROCEDURE — 71271 CT THORAX LUNG CANCER SCR C-: CPT

## 2024-08-07 RX ORDER — APIXABAN 2.5 MG/1
2.5 TABLET, FILM COATED ORAL 2 TIMES DAILY
Qty: 60 TABLET | Refills: 3 | Status: SHIPPED | OUTPATIENT
Start: 2024-08-07

## 2024-08-14 ENCOUNTER — OFFICE VISIT (OUTPATIENT)
Dept: VASCULAR SURGERY | Age: 64
End: 2024-08-14
Payer: COMMERCIAL

## 2024-08-14 VITALS
BODY MASS INDEX: 28.88 KG/M2 | SYSTOLIC BLOOD PRESSURE: 171 MMHG | OXYGEN SATURATION: 98 % | DIASTOLIC BLOOD PRESSURE: 97 MMHG | WEIGHT: 201.7 LBS | HEART RATE: 90 BPM | HEIGHT: 70 IN

## 2024-08-14 DIAGNOSIS — F17.200 SMOKER: ICD-10-CM

## 2024-08-14 DIAGNOSIS — I65.23 BILATERAL CAROTID ARTERY STENOSIS: ICD-10-CM

## 2024-08-14 DIAGNOSIS — I73.9 PAD (PERIPHERAL ARTERY DISEASE) (HCC): Primary | ICD-10-CM

## 2024-08-14 PROCEDURE — 3080F DIAST BP >= 90 MM HG: CPT | Performed by: NURSE PRACTITIONER

## 2024-08-14 PROCEDURE — 99214 OFFICE O/P EST MOD 30 MIN: CPT | Performed by: NURSE PRACTITIONER

## 2024-08-14 PROCEDURE — 3077F SYST BP >= 140 MM HG: CPT | Performed by: NURSE PRACTITIONER

## 2024-08-14 NOTE — PROGRESS NOTES
(normal) Doppler waveforms.   Middle Superficial Femoral Artery: Multiphasic (normal) Doppler waveforms.   Distal Superficial Femoral Artery: Multiphasic (normal) Doppler waveforms.   Proximal Popliteal Artery: Multiphasic (normal) Doppler waveforms.   Distal Popliteal Artery: Multiphasic (normal) Doppler waveforms.   Distal Anterior Tibial Artery: Multiphasic (normal) Doppler waveforms.   Distal Posterior Tibial Artery: Absent Doppler waveforms.   Distal Peroneal Artery: Multiphasic (normal) Doppler waveforms.   Left Lower Arterial    Proximal Common Femoral Artery: Multiphasic (normal) Doppler waveforms.   Profunda Artery: Multiphasic (normal) Doppler waveforms. .   Proximal Superficial Femoral Artery: Multiphasic (normal) Doppler waveforms.   Middle Superficial Femoral Artery: Multiphasic (normal) Doppler waveforms.   Distal Superficial Femoral Artery: Absent Doppler waveforms.   Proximal Popliteal Artery: Absent Doppler waveforms.   Distal Popliteal Artery: Absent Doppler waveforms.   Distal Anterior Tibial Artery: Monophasic Doppler waveforms.   Distal Posterior Tibial Artery: Absent Doppler waveforms.   Distal Peroneal Artery: Monophasic Doppler waveforms     Abdominal Aorta Measurements     AP TR   Dist Ao 2.6 cm       2.42 cm           Right Lower Arterial Measurements     PSV Rudy Ratio AP TR   CFA Prox 123 cm/s        1.29 cm       1.26 cm         PFA Prox 28.5 cm/s            PFA   2.4 cm       2.28 cm         SFA Prox 72.7 cm/s       0.6                 SFA Mid 73.9 cm/s       1                 SFA Dist 122 cm/s       1.65                 Pop Prox 64 cm/s       0.52                 Pop Mid 64.6 cm/s       1.01                 Pop Dist 56.4 cm/s       0.87                 PTA Dist 0 cm/s                  DUSTIN Dist 98.8 cm/s                  Nova Dist 98.8 cm/s                    Left Lower Arterial Measurements     PSV Rudy Ratio AP TR   CFA Prox 167 cm/s        1.43 cm       1.4 cm         PFA Prox 28 cm/s

## 2024-09-25 DIAGNOSIS — Z71.6 ENCOUNTER FOR SMOKING CESSATION COUNSELING: ICD-10-CM

## 2024-09-25 DIAGNOSIS — Z72.0 TOBACCO USE: ICD-10-CM

## 2024-09-25 RX ORDER — BUPROPION HYDROCHLORIDE 150 MG/1
150 TABLET, EXTENDED RELEASE ORAL 2 TIMES DAILY
Qty: 180 TABLET | Refills: 0 | OUTPATIENT
Start: 2024-09-25

## 2024-10-01 DIAGNOSIS — G89.29 CHRONIC RIGHT SHOULDER PAIN: Primary | ICD-10-CM

## 2024-10-01 DIAGNOSIS — M25.511 CHRONIC RIGHT SHOULDER PAIN: Primary | ICD-10-CM

## 2024-11-05 ENCOUNTER — TELEMEDICINE (OUTPATIENT)
Dept: PRIMARY CARE CLINIC | Facility: CLINIC | Age: 64
End: 2024-11-05

## 2024-11-05 ENCOUNTER — TELEPHONE (OUTPATIENT)
Dept: PRIMARY CARE CLINIC | Facility: CLINIC | Age: 64
End: 2024-11-05

## 2024-11-05 DIAGNOSIS — Z71.3 DIETARY COUNSELING: ICD-10-CM

## 2024-11-05 DIAGNOSIS — Z72.0 TOBACCO USE: ICD-10-CM

## 2024-11-05 DIAGNOSIS — G89.29 CHRONIC RIGHT SHOULDER PAIN: ICD-10-CM

## 2024-11-05 DIAGNOSIS — N52.9 ERECTILE DYSFUNCTION, UNSPECIFIED ERECTILE DYSFUNCTION TYPE: ICD-10-CM

## 2024-11-05 DIAGNOSIS — E66.3 OVERWEIGHT: ICD-10-CM

## 2024-11-05 DIAGNOSIS — I70.219 ATHEROSCLEROSIS OF NATIVE ARTERY OF EXTREMITY WITH INTERMITTENT CLAUDICATION, UNSPECIFIED EXTREMITY (HCC): ICD-10-CM

## 2024-11-05 DIAGNOSIS — R05.1 ACUTE COUGH: ICD-10-CM

## 2024-11-05 DIAGNOSIS — J32.9 SINUSITIS, UNSPECIFIED CHRONICITY, UNSPECIFIED LOCATION: Primary | ICD-10-CM

## 2024-11-05 DIAGNOSIS — I10 PRIMARY HYPERTENSION: ICD-10-CM

## 2024-11-05 DIAGNOSIS — E78.5 HYPERLIPIDEMIA, UNSPECIFIED HYPERLIPIDEMIA TYPE: ICD-10-CM

## 2024-11-05 DIAGNOSIS — M25.511 CHRONIC RIGHT SHOULDER PAIN: ICD-10-CM

## 2024-11-05 DIAGNOSIS — I74.3 THROMBOSIS OF ARTERY OF RIGHT LOWER EXTREMITY (HCC): ICD-10-CM

## 2024-11-05 DIAGNOSIS — R09.89 CHEST CONGESTION: ICD-10-CM

## 2024-11-05 DIAGNOSIS — F33.0 MAJOR DEPRESSIVE DISORDER, RECURRENT, MILD (HCC): ICD-10-CM

## 2024-11-09 PROBLEM — R09.89 CHEST CONGESTION: Status: ACTIVE | Noted: 2024-11-09

## 2024-11-09 PROBLEM — J32.9 SINUSITIS: Status: ACTIVE | Noted: 2024-11-09

## 2024-11-09 PROBLEM — R05.1 ACUTE COUGH: Status: ACTIVE | Noted: 2024-11-09

## 2024-11-09 ASSESSMENT — ENCOUNTER SYMPTOMS
SINUS PAIN: 1
ALLERGIC/IMMUNOLOGIC NEGATIVE: 1
SINUS PRESSURE: 1
EYES NEGATIVE: 1
GASTROINTESTINAL NEGATIVE: 1
SORE THROAT: 1
COUGH: 1

## 2024-11-09 NOTE — PROGRESS NOTES
[facility] - [facility]  [address]  [place] [address]  Office : [phone]  Fax : [phone]      Subjective:  The patient is a 64 y.o. male  who presents for f/u on  productive cough,sorethroat,earache,facial pain  and chest/sinus congestion for few days-worse last 2 days--no fever/cp/sob/wheezing/rash/abd symptoms-pt tried some OTC meds without relief.      multiple chronic medical conditions-good compliance with medications--pt here to get routeine labs and need refill on meds.no cardiopulmonary symptoms  Severe PAD-sees vascular MD-lower extremities-s/p bypass/stents-on aspirin and blood thinner  Hx of arterial occlusion right leg -removal of thrombus-on eliquis--was thought sec to tobacco use--pt says he was suggested to take blood thinner indefinitely and cardiologist recommended eliquis over plavix  Hypertension--Pt BP been controlled with meds  Prediabetes -pts blood sugar stable on diet-NEED A1C CHECK  Hyperlipidemia--pts on low carb diet and low fat diet -stable on med usually-not beeen on med in 2023  Diverticulosis of colon (without mention of hemorrhage) 2015   no symptoms    Dysrhythmia, cardiac   heart palpatations on occasion    GABE on CPAP   central and obstructive, CPAP 9 cm H2O does not wear cpap    Personal history of colonic polyps 2015   adenoma, TVA    PVD (peripheral vascular disease) with claudication (HCC)    Snoring    Tobacco abuse -CT chest neg in 2021-need recheck    Pt c/of right shoulder joint-on and off for few months-worse lately-no radiating pain-no fall/trauma-pt does lifting at work  Pt also c/of right hip  and right sciatica-on and off for few months-radiculopathy right side-no fall/trauma-no weakness or numbness  Pain is achy to sharp in nature      Patient Active Problem List   Diagnosis    Hypercholesterolemia    Snoring    Atherosclerosis of native artery of extremity with intermittent claudication (HCC)    Sleep apnea    Peripheral artery disease

## 2024-11-11 ENCOUNTER — HOSPITAL ENCOUNTER (OUTPATIENT)
Dept: GENERAL RADIOLOGY | Age: 64
Discharge: HOME | End: 2024-11-14
Payer: COMMERCIAL

## 2024-11-11 DIAGNOSIS — G89.29 CHRONIC RIGHT SHOULDER PAIN: ICD-10-CM

## 2024-11-11 DIAGNOSIS — M25.511 CHRONIC RIGHT SHOULDER PAIN: ICD-10-CM

## 2024-11-11 PROCEDURE — 73030 X-RAY EXAM OF SHOULDER: CPT

## 2024-11-15 ENCOUNTER — PATIENT MESSAGE (OUTPATIENT)
Dept: PRIMARY CARE CLINIC | Facility: CLINIC | Age: 64
End: 2024-11-15

## 2024-12-05 ENCOUNTER — HOSPITAL ENCOUNTER (OUTPATIENT)
Dept: MRI IMAGING | Age: 64
Discharge: HOME | End: 2024-12-08
Attending: FAMILY MEDICINE
Payer: COMMERCIAL

## 2024-12-05 DIAGNOSIS — G89.29 CHRONIC RIGHT SHOULDER PAIN: ICD-10-CM

## 2024-12-05 DIAGNOSIS — M25.511 CHRONIC RIGHT SHOULDER PAIN: ICD-10-CM

## 2024-12-05 PROCEDURE — 73221 MRI JOINT UPR EXTREM W/O DYE: CPT

## 2024-12-07 DIAGNOSIS — I70.219 ATHEROSCLEROSIS OF NATIVE ARTERY OF EXTREMITY WITH INTERMITTENT CLAUDICATION, UNSPECIFIED EXTREMITY (HCC): ICD-10-CM

## 2024-12-09 PROBLEM — J32.9 SINUSITIS: Status: RESOLVED | Noted: 2024-11-09 | Resolved: 2024-12-09

## 2024-12-09 RX ORDER — APIXABAN 2.5 MG/1
2.5 TABLET, FILM COATED ORAL 2 TIMES DAILY
Qty: 60 TABLET | Refills: 3 | Status: SHIPPED | OUTPATIENT
Start: 2024-12-09

## 2024-12-09 RX ORDER — ATORVASTATIN CALCIUM 20 MG/1
20 TABLET, FILM COATED ORAL DAILY
Qty: 90 TABLET | Refills: 1 | OUTPATIENT
Start: 2024-12-09

## 2024-12-09 RX ORDER — LOSARTAN POTASSIUM 100 MG/1
100 TABLET ORAL DAILY
Qty: 90 TABLET | Refills: 1 | OUTPATIENT
Start: 2024-12-09

## 2024-12-13 DIAGNOSIS — I70.219 ATHEROSCLEROSIS OF NATIVE ARTERY OF EXTREMITY WITH INTERMITTENT CLAUDICATION, UNSPECIFIED EXTREMITY (HCC): ICD-10-CM

## 2024-12-13 DIAGNOSIS — I74.3 THROMBOSIS OF ARTERY OF RIGHT LOWER EXTREMITY (HCC): ICD-10-CM

## 2024-12-13 DIAGNOSIS — Z71.3 DIETARY COUNSELING: ICD-10-CM

## 2024-12-13 DIAGNOSIS — M25.511 CHRONIC RIGHT SHOULDER PAIN: ICD-10-CM

## 2024-12-13 DIAGNOSIS — R05.1 ACUTE COUGH: ICD-10-CM

## 2024-12-13 DIAGNOSIS — E78.5 HYPERLIPIDEMIA, UNSPECIFIED HYPERLIPIDEMIA TYPE: ICD-10-CM

## 2024-12-13 DIAGNOSIS — I10 PRIMARY HYPERTENSION: ICD-10-CM

## 2024-12-13 DIAGNOSIS — N52.9 ERECTILE DYSFUNCTION, UNSPECIFIED ERECTILE DYSFUNCTION TYPE: ICD-10-CM

## 2024-12-13 DIAGNOSIS — G89.29 CHRONIC RIGHT SHOULDER PAIN: ICD-10-CM

## 2024-12-13 DIAGNOSIS — E66.3 OVERWEIGHT: ICD-10-CM

## 2024-12-13 DIAGNOSIS — F33.0 MAJOR DEPRESSIVE DISORDER, RECURRENT, MILD (HCC): ICD-10-CM

## 2024-12-13 DIAGNOSIS — R09.89 CHEST CONGESTION: ICD-10-CM

## 2024-12-13 DIAGNOSIS — Z72.0 TOBACCO USE: ICD-10-CM

## 2024-12-13 DIAGNOSIS — J32.9 SINUSITIS, UNSPECIFIED CHRONICITY, UNSPECIFIED LOCATION: ICD-10-CM

## 2024-12-13 LAB
ALBUMIN SERPL-MCNC: 3.8 G/DL (ref 3.2–4.6)
ALBUMIN/GLOB SERPL: 1.1 (ref 1–1.9)
ALP SERPL-CCNC: 78 U/L (ref 40–129)
ALT SERPL-CCNC: 20 U/L (ref 8–55)
ANION GAP SERPL CALC-SCNC: 9 MMOL/L (ref 7–16)
AST SERPL-CCNC: 21 U/L (ref 15–37)
BILIRUB SERPL-MCNC: 0.6 MG/DL (ref 0–1.2)
BUN SERPL-MCNC: 14 MG/DL (ref 8–23)
CALCIUM SERPL-MCNC: 9.4 MG/DL (ref 8.8–10.2)
CHLORIDE SERPL-SCNC: 106 MMOL/L (ref 98–107)
CO2 SERPL-SCNC: 27 MMOL/L (ref 20–29)
CREAT SERPL-MCNC: 0.81 MG/DL (ref 0.8–1.3)
EST. AVERAGE GLUCOSE BLD GHB EST-MCNC: 113 MG/DL
GLOBULIN SER CALC-MCNC: 3.5 G/DL (ref 2.3–3.5)
GLUCOSE SERPL-MCNC: 100 MG/DL (ref 70–99)
HBA1C MFR BLD: 5.6 % (ref 0–5.6)
POTASSIUM SERPL-SCNC: 4.4 MMOL/L (ref 3.5–5.1)
PROT SERPL-MCNC: 7.2 G/DL (ref 6.3–8.2)
SODIUM SERPL-SCNC: 143 MMOL/L (ref 136–145)

## 2024-12-16 ENCOUNTER — OFFICE VISIT (OUTPATIENT)
Dept: PRIMARY CARE CLINIC | Facility: CLINIC | Age: 64
End: 2024-12-16

## 2024-12-16 VITALS
BODY MASS INDEX: 29.35 KG/M2 | HEART RATE: 80 BPM | OXYGEN SATURATION: 97 % | TEMPERATURE: 99.7 F | DIASTOLIC BLOOD PRESSURE: 70 MMHG | HEIGHT: 70 IN | RESPIRATION RATE: 17 BRPM | SYSTOLIC BLOOD PRESSURE: 132 MMHG | WEIGHT: 205 LBS

## 2024-12-16 DIAGNOSIS — M19.011 ARTHRITIS OF RIGHT SHOULDER REGION: ICD-10-CM

## 2024-12-16 DIAGNOSIS — E78.5 HYPERLIPIDEMIA, UNSPECIFIED HYPERLIPIDEMIA TYPE: ICD-10-CM

## 2024-12-16 DIAGNOSIS — G89.29 CHRONIC RIGHT SHOULDER PAIN: ICD-10-CM

## 2024-12-16 DIAGNOSIS — E66.3 OVERWEIGHT: ICD-10-CM

## 2024-12-16 DIAGNOSIS — Z12.5 SCREENING FOR PROSTATE CANCER: ICD-10-CM

## 2024-12-16 DIAGNOSIS — I70.219 ATHEROSCLEROSIS OF NATIVE ARTERY OF EXTREMITY WITH INTERMITTENT CLAUDICATION, UNSPECIFIED EXTREMITY (HCC): ICD-10-CM

## 2024-12-16 DIAGNOSIS — I74.3 THROMBOSIS OF ARTERY OF RIGHT LOWER EXTREMITY (HCC): ICD-10-CM

## 2024-12-16 DIAGNOSIS — Z72.0 TOBACCO USE: ICD-10-CM

## 2024-12-16 DIAGNOSIS — M25.511 CHRONIC RIGHT SHOULDER PAIN: ICD-10-CM

## 2024-12-16 DIAGNOSIS — N52.9 ERECTILE DYSFUNCTION, UNSPECIFIED ERECTILE DYSFUNCTION TYPE: ICD-10-CM

## 2024-12-16 DIAGNOSIS — Z71.3 DIETARY COUNSELING: ICD-10-CM

## 2024-12-16 DIAGNOSIS — M77.8 TENDINITIS OF RIGHT SHOULDER: ICD-10-CM

## 2024-12-16 DIAGNOSIS — I10 PRIMARY HYPERTENSION: Primary | ICD-10-CM

## 2024-12-16 RX ORDER — NAPROXEN 500 MG/1
500 TABLET ORAL 2 TIMES DAILY WITH MEALS
Qty: 20 TABLET | Refills: 0 | Status: SHIPPED | OUTPATIENT
Start: 2024-12-16

## 2024-12-16 RX ORDER — PREDNISONE 20 MG/1
20 TABLET ORAL DAILY
Qty: 5 TABLET | Refills: 0 | Status: SHIPPED | OUTPATIENT
Start: 2024-12-16 | End: 2024-12-21

## 2024-12-16 RX ORDER — LOSARTAN POTASSIUM 100 MG/1
100 TABLET ORAL DAILY
Qty: 90 TABLET | Refills: 1 | Status: SHIPPED | OUTPATIENT
Start: 2024-12-16

## 2024-12-16 RX ORDER — SILDENAFIL 50 MG/1
50 TABLET, FILM COATED ORAL PRN
Qty: 30 TABLET | Refills: 5 | Status: SHIPPED | OUTPATIENT
Start: 2024-12-16

## 2024-12-17 PROBLEM — M19.011 ARTHRITIS OF RIGHT SHOULDER REGION: Status: ACTIVE | Noted: 2024-12-17

## 2024-12-17 PROBLEM — M77.8 TENDINITIS OF RIGHT SHOULDER: Status: ACTIVE | Noted: 2024-12-17

## 2024-12-18 NOTE — PROGRESS NOTES
supplement ,low fat diet ,low calorie diet  (may include anyor all of the following: reviewing test results, prognosis, importance of compliance, education about disease process, benefits of medications, instructions for management of acute flare-ups, and follow up plans).      Return in about 3 months (around 3/16/2025).     Francine Tripp MD

## 2025-02-02 DIAGNOSIS — I70.219 ATHEROSCLEROSIS OF NATIVE ARTERY OF EXTREMITY WITH INTERMITTENT CLAUDICATION, UNSPECIFIED EXTREMITY (HCC): ICD-10-CM

## 2025-02-03 RX ORDER — ATORVASTATIN CALCIUM 20 MG/1
20 TABLET, FILM COATED ORAL DAILY
Qty: 90 TABLET | Refills: 1 | Status: SHIPPED | OUTPATIENT
Start: 2025-02-03

## 2025-02-18 ENCOUNTER — OFFICE VISIT (OUTPATIENT)
Dept: VASCULAR SURGERY | Age: 65
End: 2025-02-18

## 2025-02-18 VITALS
BODY MASS INDEX: 30.64 KG/M2 | DIASTOLIC BLOOD PRESSURE: 96 MMHG | WEIGHT: 214 LBS | OXYGEN SATURATION: 98 % | HEART RATE: 63 BPM | SYSTOLIC BLOOD PRESSURE: 174 MMHG | HEIGHT: 70 IN

## 2025-02-18 DIAGNOSIS — I73.9 PAD (PERIPHERAL ARTERY DISEASE): Primary | ICD-10-CM

## 2025-02-18 RX ORDER — CARVEDILOL 12.5 MG/1
12.5 TABLET ORAL 2 TIMES DAILY WITH MEALS
COMMUNITY

## 2025-02-18 RX ORDER — AMLODIPINE BESYLATE 5 MG/1
5 TABLET ORAL DAILY
COMMUNITY

## 2025-02-18 NOTE — PROGRESS NOTES
DATE OF VISIT: 2/18/2025      [facility]  Jass Charles Jr. is a 64 y.o. male who follows up for his arterial duplex study. He does reports some left lower extremity leg fatigue. He has no open ulcerations. He remains on atorvastatin, ASA, Eliquis and atorvastatin. He has stopped smoking after having heart surgery in December at [facility].            MEDICAL HISTORY:   Past Medical History:   Diagnosis Date    Diverticulosis of colon (without mention of hemorrhage) 2015    no symptoms    Dysrhythmia, cardiac     heart palpatations on occasion    Hypercholesterolemia     managed with meds    Hypertension 2020    GABE on CPAP     central and obstructive, CPAP 9 cm H2O does not wear cpap    Personal history of colonic polyps 2015    adenoma, TVA    PVD (peripheral vascular disease) with claudication (HCC)     Snoring     Tobacco abuse          SURGICAL HISTORY:   Past Surgical History:   Procedure Laterality Date    COLONOSCOPY  last 2/16/15    Uma--three trans TA, one spl flx TVA, rectal TVA--3 year recall    COLONOSCOPY N/A 7/2/2024    COLONOSCOPY POLYPECTOMY REMOVAL HOT BIOPSY performed by Alicai Becerril MD at [facility] ENDOSCOPY    KNEE ARTHROSCOPY Left 1996    VASCULAR SURGERY Left 6-18-15    profundoplasty of profunda femoris artery    VASCULAR SURGERY Left 8/29/2022    RIGHT LOWER EXTREMITY ARTERIOGRAM WITH BALLOON ANGIOPLASTY AND STENT OF POPLITEAL ARTERY performed by Pete Echevarria MD at [facility] MAIN OR    VASCULAR SURGERY Right 10/12/2022    RIGHT LEG ARTERIOGRAM POSSIBLE THROMBOLYSIS performed by Pete Echevarria MD at [facility] OR    VASCULAR SURGERY Right 10/13/2022    ARTERIOGRAM RIGHT LEG LYSIS RECHECK performed by Pete Echevarria MD at [facility] OR       Review of Systems:  Constitutional:   Negative for fevers and unexplained weight loss.  Respiratory:   Negative for hemoptysis.  Cardiovascular:   Negative except as noted in HPI.  Musculoskeletal:  Negative for active, unexplained/severe joint

## 2025-02-21 ENCOUNTER — OFFICE VISIT (OUTPATIENT)
Dept: ORTHOPEDIC SURGERY | Age: 65
End: 2025-02-21

## 2025-02-21 DIAGNOSIS — M67.921 TENDINOPATHY OF RIGHT BICEPS TENDON: ICD-10-CM

## 2025-02-21 DIAGNOSIS — M25.511 RIGHT SHOULDER PAIN, UNSPECIFIED CHRONICITY: Primary | ICD-10-CM

## 2025-02-21 RX ORDER — METHYLPREDNISOLONE ACETATE 80 MG/ML
80 INJECTION, SUSPENSION INTRA-ARTICULAR; INTRALESIONAL; INTRAMUSCULAR; SOFT TISSUE ONCE
Status: COMPLETED | OUTPATIENT
Start: 2025-02-21 | End: 2025-02-21

## 2025-02-21 RX ADMIN — METHYLPREDNISOLONE ACETATE 80 MG: 80 INJECTION, SUSPENSION INTRA-ARTICULAR; INTRALESIONAL; INTRAMUSCULAR; SOFT TISSUE at 10:38

## 2025-02-21 NOTE — PROGRESS NOTES
[facility]          Patient ID:  Name: Jass Charles Jr.  AGE/Gender: 64 y.o. male  MRN: [id number]  : 1960    Date of Consultation:  2025          ALLERGIES: No Known Allergies     CC: Right shoulder pain    History:  The patient 64 y.o. right hand dominant male who presents today as a new patient complaining of right shoulder pain.  This started 6 months ago when he was pulling stuff out of the truck and felt a tear in his right shoulder.  He rated this is 8 out of 10 the pain scale.  Since then has been having some sharp achy pain localized to the anterior aspect of the right shoulder.  Works as a part  at [employer].  Recently he had a four-vessel bypass done on  for acute MI.  He has been on Eliquis since then.  He takes occasional Advil Tylenol and Voltaren.  Has pain with activity.They have no other complaints or concerns.    Review of Systems:  Pertinent items are noted in HPI.    Past Medical History Includes:   Past Medical History:   Diagnosis Date    Diverticulosis of colon (without mention of hemorrhage)     no symptoms    Dysrhythmia, cardiac     heart palpatations on occasion    Hypercholesterolemia     managed with meds    Hypertension     GABE on CPAP     central and obstructive, CPAP 9 cm H2O does not wear cpap    Personal history of colonic polyps 2015    adenoma, TVA    PVD (peripheral vascular disease) with claudication     Snoring     Tobacco abuse    ,   Past Surgical History:   Procedure Laterality Date    COLONOSCOPY  last 2/16/15    [facility]--three trans TA, one spl flx TVA, rectal TVA--3 year recall    COLONOSCOPY N/A 2024    COLONOSCOPY POLYPECTOMY REMOVAL HOT BIOPSY performed by Alicia Becerril MD at [facility] ENDOSCOPY    KNEE ARTHROSCOPY Left     VASCULAR SURGERY Left 6-18-15    profundoplasty of profunda femoris artery    VASCULAR SURGERY Left 2022    RIGHT LOWER EXTREMITY ARTERIOGRAM WITH BALLOON ANGIOPLASTY AND STENT OF

## 2025-06-09 DIAGNOSIS — Z12.5 SCREENING FOR PROSTATE CANCER: ICD-10-CM

## 2025-06-09 LAB — PSA SERPL-MCNC: 1.2 NG/ML (ref 0–4)

## 2025-06-11 DIAGNOSIS — I10 PRIMARY HYPERTENSION: ICD-10-CM

## 2025-06-11 RX ORDER — LOSARTAN POTASSIUM 100 MG/1
100 TABLET ORAL DAILY
Qty: 90 TABLET | Refills: 0 | Status: SHIPPED | OUTPATIENT
Start: 2025-06-11

## 2025-06-11 NOTE — TELEPHONE ENCOUNTER
losartan (COZAAR) 100 MG tablet [Dr. Francine Tripp MD     Patient requested medication refill     Patient has upcoming appointment on 06/17/2025

## 2025-06-16 SDOH — ECONOMIC STABILITY: FOOD INSECURITY: WITHIN THE PAST 12 MONTHS, THE FOOD YOU BOUGHT JUST DIDN'T LAST AND YOU DIDN'T HAVE MONEY TO GET MORE.: PATIENT DECLINED

## 2025-06-16 SDOH — ECONOMIC STABILITY: INCOME INSECURITY: IN THE LAST 12 MONTHS, WAS THERE A TIME WHEN YOU WERE NOT ABLE TO PAY THE MORTGAGE OR RENT ON TIME?: PATIENT DECLINED

## 2025-06-16 SDOH — ECONOMIC STABILITY: TRANSPORTATION INSECURITY
IN THE PAST 12 MONTHS, HAS LACK OF TRANSPORTATION KEPT YOU FROM MEETINGS, WORK, OR FROM GETTING THINGS NEEDED FOR DAILY LIVING?: PATIENT DECLINED

## 2025-06-16 SDOH — ECONOMIC STABILITY: FOOD INSECURITY: WITHIN THE PAST 12 MONTHS, YOU WORRIED THAT YOUR FOOD WOULD RUN OUT BEFORE YOU GOT MONEY TO BUY MORE.: PATIENT DECLINED

## 2025-06-16 SDOH — ECONOMIC STABILITY: TRANSPORTATION INSECURITY
IN THE PAST 12 MONTHS, HAS THE LACK OF TRANSPORTATION KEPT YOU FROM MEDICAL APPOINTMENTS OR FROM GETTING MEDICATIONS?: PATIENT DECLINED

## 2025-06-16 ASSESSMENT — PATIENT HEALTH QUESTIONNAIRE - PHQ9
10. IF YOU CHECKED OFF ANY PROBLEMS, HOW DIFFICULT HAVE THESE PROBLEMS MADE IT FOR YOU TO DO YOUR WORK, TAKE CARE OF THINGS AT HOME, OR GET ALONG WITH OTHER PEOPLE: NOT DIFFICULT AT ALL
4. FEELING TIRED OR HAVING LITTLE ENERGY: NOT AT ALL
SUM OF ALL RESPONSES TO PHQ QUESTIONS 1-9: 0
5. POOR APPETITE OR OVEREATING: NOT AT ALL
1. LITTLE INTEREST OR PLEASURE IN DOING THINGS: NOT AT ALL
7. TROUBLE CONCENTRATING ON THINGS, SUCH AS READING THE NEWSPAPER OR WATCHING TELEVISION: NOT AT ALL
SUM OF ALL RESPONSES TO PHQ QUESTIONS 1-9: 0
6. FEELING BAD ABOUT YOURSELF - OR THAT YOU ARE A FAILURE OR HAVE LET YOURSELF OR YOUR FAMILY DOWN: NOT AT ALL
8. MOVING OR SPEAKING SO SLOWLY THAT OTHER PEOPLE COULD HAVE NOTICED. OR THE OPPOSITE - BEING SO FIDGETY OR RESTLESS THAT YOU HAVE BEEN MOVING AROUND A LOT MORE THAN USUAL: NOT AT ALL
SUM OF ALL RESPONSES TO PHQ QUESTIONS 1-9: 0
9. THOUGHTS THAT YOU WOULD BE BETTER OFF DEAD, OR OF HURTING YOURSELF: NOT AT ALL
3. TROUBLE FALLING OR STAYING ASLEEP: NOT AT ALL
8. MOVING OR SPEAKING SO SLOWLY THAT OTHER PEOPLE COULD HAVE NOTICED. OR THE OPPOSITE, BEING SO FIGETY OR RESTLESS THAT YOU HAVE BEEN MOVING AROUND A LOT MORE THAN USUAL: NOT AT ALL
2. FEELING DOWN, DEPRESSED OR HOPELESS: NOT AT ALL
1. LITTLE INTEREST OR PLEASURE IN DOING THINGS: NOT AT ALL
9. THOUGHTS THAT YOU WOULD BE BETTER OFF DEAD, OR OF HURTING YOURSELF: NOT AT ALL
7. TROUBLE CONCENTRATING ON THINGS, SUCH AS READING THE NEWSPAPER OR WATCHING TELEVISION: NOT AT ALL
5. POOR APPETITE OR OVEREATING: NOT AT ALL
3. TROUBLE FALLING OR STAYING ASLEEP: NOT AT ALL
SUM OF ALL RESPONSES TO PHQ QUESTIONS 1-9: 0
10. IF YOU CHECKED OFF ANY PROBLEMS, HOW DIFFICULT HAVE THESE PROBLEMS MADE IT FOR YOU TO DO YOUR WORK, TAKE CARE OF THINGS AT HOME, OR GET ALONG WITH OTHER PEOPLE: NOT DIFFICULT AT ALL
4. FEELING TIRED OR HAVING LITTLE ENERGY: NOT AT ALL
SUM OF ALL RESPONSES TO PHQ QUESTIONS 1-9: 0
2. FEELING DOWN, DEPRESSED OR HOPELESS: NOT AT ALL
6. FEELING BAD ABOUT YOURSELF - OR THAT YOU ARE A FAILURE OR HAVE LET YOURSELF OR YOUR FAMILY DOWN: NOT AT ALL

## 2025-06-17 ENCOUNTER — OFFICE VISIT (OUTPATIENT)
Dept: PRIMARY CARE CLINIC | Facility: CLINIC | Age: 65
End: 2025-06-17

## 2025-06-17 VITALS
WEIGHT: 224 LBS | BODY MASS INDEX: 32.07 KG/M2 | HEART RATE: 58 BPM | DIASTOLIC BLOOD PRESSURE: 80 MMHG | OXYGEN SATURATION: 96 % | SYSTOLIC BLOOD PRESSURE: 160 MMHG | RESPIRATION RATE: 18 BRPM | TEMPERATURE: 98.2 F | HEIGHT: 70 IN

## 2025-06-17 DIAGNOSIS — I10 PRIMARY HYPERTENSION: Primary | ICD-10-CM

## 2025-06-17 DIAGNOSIS — N52.9 ERECTILE DYSFUNCTION, UNSPECIFIED ERECTILE DYSFUNCTION TYPE: ICD-10-CM

## 2025-06-17 DIAGNOSIS — E78.5 HYPERLIPIDEMIA, UNSPECIFIED HYPERLIPIDEMIA TYPE: ICD-10-CM

## 2025-06-17 DIAGNOSIS — G62.9 PERIPHERAL POLYNEUROPATHY: ICD-10-CM

## 2025-06-17 DIAGNOSIS — F33.1 MAJOR DEPRESSIVE DISORDER, RECURRENT, MODERATE (HCC): ICD-10-CM

## 2025-06-17 DIAGNOSIS — I70.219 ATHEROSCLEROSIS OF NATIVE ARTERY OF EXTREMITY WITH INTERMITTENT CLAUDICATION, UNSPECIFIED EXTREMITY: ICD-10-CM

## 2025-06-17 DIAGNOSIS — I74.3 THROMBOSIS OF ARTERY OF RIGHT LOWER EXTREMITY (HCC): ICD-10-CM

## 2025-06-17 DIAGNOSIS — Z71.3 DIETARY COUNSELING: ICD-10-CM

## 2025-06-17 DIAGNOSIS — E66.3 OVERWEIGHT: ICD-10-CM

## 2025-06-17 PROCEDURE — 3077F SYST BP >= 140 MM HG: CPT | Performed by: FAMILY MEDICINE

## 2025-06-17 PROCEDURE — 3079F DIAST BP 80-89 MM HG: CPT | Performed by: FAMILY MEDICINE

## 2025-06-17 PROCEDURE — 99214 OFFICE O/P EST MOD 30 MIN: CPT | Performed by: FAMILY MEDICINE

## 2025-06-17 RX ORDER — GABAPENTIN 300 MG/1
300 CAPSULE ORAL 2 TIMES DAILY
Qty: 180 CAPSULE | Refills: 1 | Status: SHIPPED | OUTPATIENT
Start: 2025-06-17 | End: 2025-06-19 | Stop reason: SDUPTHER

## 2025-06-17 RX ORDER — ATORVASTATIN CALCIUM 40 MG/1
40 TABLET, FILM COATED ORAL DAILY
Qty: 90 TABLET | Refills: 1 | Status: SHIPPED | OUTPATIENT
Start: 2025-06-17 | End: 2025-06-19 | Stop reason: SDUPTHER

## 2025-06-17 RX ORDER — LOSARTAN POTASSIUM 100 MG/1
100 TABLET ORAL DAILY
Qty: 90 TABLET | Refills: 1 | Status: SHIPPED | OUTPATIENT
Start: 2025-06-17 | End: 2025-06-19 | Stop reason: SDUPTHER

## 2025-06-17 ASSESSMENT — ENCOUNTER SYMPTOMS
GASTROINTESTINAL NEGATIVE: 1
RESPIRATORY NEGATIVE: 1
EYES NEGATIVE: 1
ALLERGIC/IMMUNOLOGIC NEGATIVE: 1

## 2025-06-17 NOTE — PROGRESS NOTES
04/26/2023     Lab Results   Component Value Date    TRIG 66 06/28/2024    TRIG 100 04/26/2023     Lab Results   Component Value Date    HDL 48 06/28/2024    HDL 58 04/26/2023     No components found for: \"LDLCHOLESTEROL\", \"LDLCALC\"    Lab Results   Component Value Date    VLDL 13 06/28/2024    VLDL 20 04/26/2023     Lab Results   Component Value Date    CHOLHDLRATIO 2.9 06/28/2024    CHOLHDLRATIO 3.0 04/26/2023       US Result (most recent):  US SCROTUM AND TESTICLES   CT Result (most recent):  CTA CHEST ABDOMEN W CONTRAST 12/26/2024    Narrative  PROCEDURE: CTA DISSECTION RULE OUT (CHEST ABDOMEN)    INDICATION: Acute aortic syndrome (AAS) suspected;    COMPARISON: None    CONTRAST:  IOHEXOL 350 MG IODINE/ML INTRAVENOUS SOLUTION 100mL    TECHNIQUE:  CTA of the thoracoabdominal aorta was performed with a thin-section multidetector volume acquisition of the chest without IV contrast and subsequently the thoracoabdominal aorta during the bolus administration of non-ionic contrast.    3-D axial, coronal, and sagittal MIP angiographic images were reconstructed and reviewed. The multidimensional MIP images were stored on PACS and utilized for interpretation.    VASCULAR FINDINGS:  Aorta: The aorta is normal in course and caliber. There is no evidence of aortic aneurysm or dissection. No intramural hematoma or penetrating aortic ulcer. Visualized great vessels unremarkable. No pulmonary artery filling defect.    Celiac: Celiac axis is patent. Hepatic arteries patent. Splenic artery patent.  Superior mesenteric artery: Patent without stenosis.  Renal arteries: Right renal artery patent. No acute abnormality or significant stenosis. Left renal artery patent. No acute abnormality or significant stenosis.    Iliac arteries: The common, internal and external iliac arteries are patient. No acute abnormality.    NONVASCULAR FINDINGS:    CHEST    Thoracic inlet:  No adenopathy or masses.    Mediastinum and janeth:  No pathologic

## 2025-06-19 DIAGNOSIS — I10 PRIMARY HYPERTENSION: ICD-10-CM

## 2025-06-19 DIAGNOSIS — I70.219 ATHEROSCLEROSIS OF NATIVE ARTERY OF EXTREMITY WITH INTERMITTENT CLAUDICATION, UNSPECIFIED EXTREMITY: ICD-10-CM

## 2025-06-19 DIAGNOSIS — G62.9 PERIPHERAL POLYNEUROPATHY: ICD-10-CM

## 2025-06-19 RX ORDER — ATORVASTATIN CALCIUM 40 MG/1
40 TABLET, FILM COATED ORAL DAILY
Qty: 90 TABLET | Refills: 1 | Status: SHIPPED | OUTPATIENT
Start: 2025-06-19

## 2025-06-19 RX ORDER — GABAPENTIN 300 MG/1
300 CAPSULE ORAL 2 TIMES DAILY
Qty: 180 CAPSULE | Refills: 1 | Status: SHIPPED | OUTPATIENT
Start: 2025-06-19 | End: 2025-12-16

## 2025-06-19 RX ORDER — LOSARTAN POTASSIUM 100 MG/1
100 TABLET ORAL DAILY
Qty: 90 TABLET | Refills: 1 | Status: SHIPPED | OUTPATIENT
Start: 2025-06-19

## 2025-07-15 DIAGNOSIS — I70.219 ATHEROSCLEROSIS OF NATIVE ARTERY OF EXTREMITY WITH INTERMITTENT CLAUDICATION, UNSPECIFIED EXTREMITY: Primary | ICD-10-CM

## 2025-07-15 DIAGNOSIS — G62.9 PERIPHERAL POLYNEUROPATHY: ICD-10-CM

## 2025-07-15 RX ORDER — PREGABALIN 50 MG/1
50 CAPSULE ORAL 2 TIMES DAILY
Qty: 60 CAPSULE | Refills: 2 | Status: SHIPPED | OUTPATIENT
Start: 2025-07-15 | End: 2025-10-13

## 2025-07-28 ENCOUNTER — PATIENT MESSAGE (OUTPATIENT)
Dept: PRIMARY CARE CLINIC | Facility: CLINIC | Age: 65
End: 2025-07-28

## 2025-07-28 DIAGNOSIS — N52.9 ERECTILE DYSFUNCTION, UNSPECIFIED ERECTILE DYSFUNCTION TYPE: ICD-10-CM

## 2025-07-28 RX ORDER — SILDENAFIL 50 MG/1
50 TABLET, FILM COATED ORAL PRN
Qty: 30 TABLET | Refills: 5 | Status: SHIPPED | OUTPATIENT
Start: 2025-07-28

## 2025-08-06 DIAGNOSIS — G62.9 PERIPHERAL POLYNEUROPATHY: ICD-10-CM

## 2025-08-06 DIAGNOSIS — I70.219 ATHEROSCLEROSIS OF NATIVE ARTERY OF EXTREMITY WITH INTERMITTENT CLAUDICATION, UNSPECIFIED EXTREMITY: ICD-10-CM

## 2025-08-06 RX ORDER — PREGABALIN 50 MG/1
50 CAPSULE ORAL 2 TIMES DAILY
Qty: 60 CAPSULE | Refills: 2 | Status: SHIPPED | OUTPATIENT
Start: 2025-08-06 | End: 2025-11-04

## 2025-08-19 ENCOUNTER — TELEMEDICINE (OUTPATIENT)
Dept: PRIMARY CARE CLINIC | Facility: CLINIC | Age: 65
End: 2025-08-19
Payer: COMMERCIAL

## 2025-08-19 DIAGNOSIS — I70.219 ATHEROSCLEROSIS OF NATIVE ARTERY OF EXTREMITY WITH INTERMITTENT CLAUDICATION, UNSPECIFIED EXTREMITY: ICD-10-CM

## 2025-08-19 DIAGNOSIS — R06.6 HICCUPS: Primary | ICD-10-CM

## 2025-08-19 DIAGNOSIS — R13.19 ESOPHAGEAL DYSPHAGIA: ICD-10-CM

## 2025-08-19 DIAGNOSIS — I10 PRIMARY HYPERTENSION: ICD-10-CM

## 2025-08-19 DIAGNOSIS — Z12.2 SCREENING FOR LUNG CANCER: ICD-10-CM

## 2025-08-19 DIAGNOSIS — I25.810 CORONARY ARTERY DISEASE INVOLVING CORONARY BYPASS GRAFT OF NATIVE HEART WITHOUT ANGINA PECTORIS: ICD-10-CM

## 2025-08-19 DIAGNOSIS — I73.9 PAD (PERIPHERAL ARTERY DISEASE): ICD-10-CM

## 2025-08-19 DIAGNOSIS — Z71.3 DIETARY COUNSELING: ICD-10-CM

## 2025-08-19 DIAGNOSIS — Z72.0 TOBACCO USE: ICD-10-CM

## 2025-08-19 DIAGNOSIS — E78.5 HYPERLIPIDEMIA, UNSPECIFIED HYPERLIPIDEMIA TYPE: ICD-10-CM

## 2025-08-19 PROCEDURE — 99214 OFFICE O/P EST MOD 30 MIN: CPT | Performed by: FAMILY MEDICINE

## 2025-08-19 RX ORDER — DOXYCYCLINE 100 MG/1
100 CAPSULE ORAL 2 TIMES DAILY
COMMUNITY
Start: 2025-08-16 | End: 2025-08-23

## 2025-08-19 RX ORDER — CHLORPROMAZINE HYDROCHLORIDE 25 MG/1
25 TABLET, FILM COATED ORAL 3 TIMES DAILY PRN
Qty: 30 TABLET | Refills: 0 | Status: SHIPPED | OUTPATIENT
Start: 2025-08-19 | End: 2025-08-29

## 2025-08-19 RX ORDER — OXYCODONE HYDROCHLORIDE 5 MG/1
5 TABLET ORAL EVERY 6 HOURS PRN
COMMUNITY
Start: 2025-08-15 | End: 2025-08-22

## 2025-08-23 PROBLEM — R13.19 ESOPHAGEAL DYSPHAGIA: Status: ACTIVE | Noted: 2025-08-23

## 2025-08-23 PROBLEM — I25.810 CORONARY ARTERY DISEASE INVOLVING CORONARY BYPASS GRAFT OF NATIVE HEART WITHOUT ANGINA PECTORIS: Status: ACTIVE | Noted: 2025-08-23

## 2025-08-23 PROBLEM — R06.6 HICCUPS: Status: ACTIVE | Noted: 2025-08-23

## 2025-08-23 ASSESSMENT — ENCOUNTER SYMPTOMS
SORE THROAT: 1
SINUS PAIN: 1
GASTROINTESTINAL NEGATIVE: 1
SINUS PRESSURE: 1
ALLERGIC/IMMUNOLOGIC NEGATIVE: 1
EYES NEGATIVE: 1
COUGH: 1

## (undated) DEVICE — CONNECTOR TBNG OD5-7MM O2 END DISP

## (undated) DEVICE — SINGLE PORT MANIFOLD: Brand: NEPTUNE 2

## (undated) DEVICE — GAUZE,SPONGE,4"X4",12PLY,WOVEN,NS,LF: Brand: MEDLINE

## (undated) DEVICE — CANNULA NSL ORAL AD FOR CAPNOFLEX CO2 O2 AIRLFE

## (undated) DEVICE — SYRINGE MED 10ML LUERLOCK TIP W/O SFTY DISP

## (undated) DEVICE — SYRINGE MEDICAL 3ML CLEAR PLASTIC STANDARD NON CONTROL LUERLOCK TIP DISPOSABLE

## (undated) DEVICE — INTRODUCER SHTH 5FR CANN L11CM DIL TIP 25MM GRY TUNGSTEN

## (undated) DEVICE — CONTAINER FORMALIN PREFILLED 10% NBF 60ML

## (undated) DEVICE — KENDALL RADIOLUCENT FOAM MONITORING ELECTRODE RECTANGULAR SHAPE: Brand: KENDALL

## (undated) DEVICE — YANKAUER,BULB TIP,W/O VENT,RIGID,STERILE: Brand: MEDLINE

## (undated) DEVICE — ENDOSCOPIC KIT 1.1+ OP4 CA DE 2 GWN AAMI LEVEL 3

## (undated) DEVICE — FORCEPS BX L240CM JAW DIA2.8MM L CAP W/ NDL MIC MESH TOOTH

## (undated) DEVICE — ELECTRODE PT RET AD L9FT HI MOIST COND ADH HYDRGEL CORDED

## (undated) DEVICE — AIRLIFE™ OXYGEN TUBING 7 FEET (2.1 M) CRUSH RESISTANT OXYGEN TUBING, VINYL TIPPED: Brand: AIRLIFE™

## (undated) DEVICE — SYRINGE, LUER SLIP, STERILE, 60ML: Brand: MEDLINE

## (undated) DEVICE — SNARE VASC L240CM LOOP W10MM SHTH DIA2.4MM RND STIFF CLD

## (undated) DEVICE — GUIDEWIRE VASC L260CM DIA0.035IN TIP L7CM PTFE S STL STR